# Patient Record
Sex: FEMALE
[De-identification: names, ages, dates, MRNs, and addresses within clinical notes are randomized per-mention and may not be internally consistent; named-entity substitution may affect disease eponyms.]

---

## 2023-05-28 ENCOUNTER — TRANSCRIPTION ENCOUNTER (OUTPATIENT)
Age: 88
End: 2023-05-28

## 2023-05-29 ENCOUNTER — INPATIENT (INPATIENT)
Facility: HOSPITAL | Age: 88
LOS: 7 days | Discharge: NURSING, REHAB OR LONG-TERM CARE | DRG: 24 | End: 2023-06-06
Attending: FAMILY MEDICINE | Admitting: NEUROLOGICAL SURGERY
Payer: MEDICARE

## 2023-05-29 ENCOUNTER — APPOINTMENT (OUTPATIENT)
Dept: NEUROSURGERY | Facility: HOSPITAL | Age: 88
End: 2023-05-29

## 2023-05-29 VITALS
OXYGEN SATURATION: 100 % | HEIGHT: 64 IN | WEIGHT: 114.64 LBS | RESPIRATION RATE: 21 BRPM | HEART RATE: 102 BPM | SYSTOLIC BLOOD PRESSURE: 124 MMHG | TEMPERATURE: 97 F | DIASTOLIC BLOOD PRESSURE: 57 MMHG

## 2023-05-29 DIAGNOSIS — I48.91 UNSPECIFIED ATRIAL FIBRILLATION: ICD-10-CM

## 2023-05-29 DIAGNOSIS — I63.9 CEREBRAL INFARCTION, UNSPECIFIED: ICD-10-CM

## 2023-05-29 LAB
A1C WITH ESTIMATED AVERAGE GLUCOSE RESULT: 5.7 % — HIGH (ref 4–5.6)
ALBUMIN SERPL ELPH-MCNC: 3 G/DL — LOW (ref 3.3–5.2)
ALP SERPL-CCNC: 70 U/L — SIGNIFICANT CHANGE UP (ref 40–120)
ALT FLD-CCNC: 21 U/L — SIGNIFICANT CHANGE UP
AMPHET UR-MCNC: NEGATIVE — SIGNIFICANT CHANGE UP
ANION GAP SERPL CALC-SCNC: 12 MMOL/L — SIGNIFICANT CHANGE UP (ref 5–17)
APPEARANCE UR: CLEAR — SIGNIFICANT CHANGE UP
APTT BLD: 28.8 SEC — SIGNIFICANT CHANGE UP (ref 27.5–35.5)
APTT BLD: 32.1 SEC — SIGNIFICANT CHANGE UP (ref 27.5–35.5)
AST SERPL-CCNC: 20 U/L — SIGNIFICANT CHANGE UP
BACTERIA # UR AUTO: ABNORMAL
BARBITURATES UR SCN-MCNC: NEGATIVE — SIGNIFICANT CHANGE UP
BASOPHILS # BLD AUTO: 0.02 K/UL — SIGNIFICANT CHANGE UP (ref 0–0.2)
BASOPHILS NFR BLD AUTO: 0.3 % — SIGNIFICANT CHANGE UP (ref 0–2)
BENZODIAZ UR-MCNC: NEGATIVE — SIGNIFICANT CHANGE UP
BILIRUB SERPL-MCNC: 0.6 MG/DL — SIGNIFICANT CHANGE UP (ref 0.4–2)
BILIRUB UR-MCNC: NEGATIVE — SIGNIFICANT CHANGE UP
BLD GP AB SCN SERPL QL: SIGNIFICANT CHANGE UP
BUN SERPL-MCNC: 27.2 MG/DL — HIGH (ref 8–20)
CALCIUM SERPL-MCNC: 8.3 MG/DL — LOW (ref 8.4–10.5)
CHLORIDE SERPL-SCNC: 104 MMOL/L — SIGNIFICANT CHANGE UP (ref 96–108)
CHOLEST SERPL-MCNC: 60 MG/DL — SIGNIFICANT CHANGE UP
CK SERPL-CCNC: 43 U/L — SIGNIFICANT CHANGE UP (ref 25–170)
CO2 SERPL-SCNC: 21 MMOL/L — LOW (ref 22–29)
COCAINE METAB.OTHER UR-MCNC: NEGATIVE — SIGNIFICANT CHANGE UP
COLOR SPEC: YELLOW — SIGNIFICANT CHANGE UP
CREAT SERPL-MCNC: 0.68 MG/DL — SIGNIFICANT CHANGE UP (ref 0.5–1.3)
DIFF PNL FLD: ABNORMAL
EGFR: 82 ML/MIN/1.73M2 — SIGNIFICANT CHANGE UP
EOSINOPHIL # BLD AUTO: 0.01 K/UL — SIGNIFICANT CHANGE UP (ref 0–0.5)
EOSINOPHIL NFR BLD AUTO: 0.1 % — SIGNIFICANT CHANGE UP (ref 0–6)
EPI CELLS # UR: SIGNIFICANT CHANGE UP
GLUCOSE SERPL-MCNC: 116 MG/DL — HIGH (ref 70–99)
GLUCOSE UR QL: NEGATIVE — SIGNIFICANT CHANGE UP
HCT VFR BLD CALC: 16.6 % — CRITICAL LOW (ref 34.5–45)
HCT VFR BLD CALC: 27.3 % — LOW (ref 34.5–45)
HDLC SERPL-MCNC: 27 MG/DL — LOW
HGB BLD-MCNC: 5.5 G/DL — CRITICAL LOW (ref 11.5–15.5)
HGB BLD-MCNC: 9 G/DL — LOW (ref 11.5–15.5)
IMM GRANULOCYTES NFR BLD AUTO: 0.6 % — SIGNIFICANT CHANGE UP (ref 0–0.9)
INR BLD: 1.14 RATIO — SIGNIFICANT CHANGE UP (ref 0.88–1.16)
INR BLD: 1.61 RATIO — HIGH (ref 0.88–1.16)
KETONES UR-MCNC: ABNORMAL
LEUKOCYTE ESTERASE UR-ACNC: ABNORMAL
LIPID PNL WITH DIRECT LDL SERPL: 26 MG/DL — SIGNIFICANT CHANGE UP
LYMPHOCYTES # BLD AUTO: 0.66 K/UL — LOW (ref 1–3.3)
LYMPHOCYTES # BLD AUTO: 9.4 % — LOW (ref 13–44)
MAGNESIUM SERPL-MCNC: 1.8 MG/DL — SIGNIFICANT CHANGE UP (ref 1.6–2.6)
MANUAL SMEAR VERIFICATION: SIGNIFICANT CHANGE UP
MCHC RBC-ENTMCNC: 26.1 PG — LOW (ref 27–34)
MCHC RBC-ENTMCNC: 26.7 PG — LOW (ref 27–34)
MCHC RBC-ENTMCNC: 33 GM/DL — SIGNIFICANT CHANGE UP (ref 32–36)
MCHC RBC-ENTMCNC: 33.1 GM/DL — SIGNIFICANT CHANGE UP (ref 32–36)
MCV RBC AUTO: 79.1 FL — LOW (ref 80–100)
MCV RBC AUTO: 80.6 FL — SIGNIFICANT CHANGE UP (ref 80–100)
METHADONE UR-MCNC: NEGATIVE — SIGNIFICANT CHANGE UP
MONOCYTES # BLD AUTO: 0.64 K/UL — SIGNIFICANT CHANGE UP (ref 0–0.9)
MONOCYTES NFR BLD AUTO: 9.1 % — SIGNIFICANT CHANGE UP (ref 2–14)
MRSA PCR RESULT.: SIGNIFICANT CHANGE UP
NEUTROPHILS # BLD AUTO: 5.66 K/UL — SIGNIFICANT CHANGE UP (ref 1.8–7.4)
NEUTROPHILS NFR BLD AUTO: 80.5 % — HIGH (ref 43–77)
NITRITE UR-MCNC: POSITIVE
NON HDL CHOLESTEROL: 33 MG/DL — SIGNIFICANT CHANGE UP
OPIATES UR-MCNC: NEGATIVE — SIGNIFICANT CHANGE UP
PCP SPEC-MCNC: SIGNIFICANT CHANGE UP
PCP UR-MCNC: NEGATIVE — SIGNIFICANT CHANGE UP
PH UR: 5 — SIGNIFICANT CHANGE UP (ref 5–8)
PHOSPHATE SERPL-MCNC: 1.7 MG/DL — LOW (ref 2.4–4.7)
PHOSPHATE SERPL-MCNC: 3.4 MG/DL — SIGNIFICANT CHANGE UP (ref 2.4–4.7)
PLAT MORPH BLD: NORMAL — SIGNIFICANT CHANGE UP
PLATELET # BLD AUTO: 153 K/UL — SIGNIFICANT CHANGE UP (ref 150–400)
PLATELET # BLD AUTO: 95 K/UL — LOW (ref 150–400)
POTASSIUM SERPL-MCNC: 3.5 MMOL/L — SIGNIFICANT CHANGE UP (ref 3.5–5.3)
POTASSIUM SERPL-SCNC: 3.5 MMOL/L — SIGNIFICANT CHANGE UP (ref 3.5–5.3)
PROT SERPL-MCNC: 5.9 G/DL — LOW (ref 6.6–8.7)
PROT UR-MCNC: 30 MG/DL
PROTHROM AB SERPL-ACNC: 13.3 SEC — SIGNIFICANT CHANGE UP (ref 10.5–13.4)
PROTHROM AB SERPL-ACNC: 18.8 SEC — HIGH (ref 10.5–13.4)
RBC # BLD: 2.06 M/UL — LOW (ref 3.8–5.2)
RBC # BLD: 3.45 M/UL — LOW (ref 3.8–5.2)
RBC # FLD: 14.7 % — HIGH (ref 10.3–14.5)
RBC # FLD: 14.9 % — HIGH (ref 10.3–14.5)
RBC BLD AUTO: NORMAL — SIGNIFICANT CHANGE UP
RBC CASTS # UR COMP ASSIST: ABNORMAL /HPF (ref 0–4)
S AUREUS DNA NOSE QL NAA+PROBE: SIGNIFICANT CHANGE UP
SODIUM SERPL-SCNC: 136 MMOL/L — SIGNIFICANT CHANGE UP (ref 135–145)
SP GR SPEC: 1 — LOW (ref 1.01–1.02)
THC UR QL: NEGATIVE — SIGNIFICANT CHANGE UP
TRIGL SERPL-MCNC: 34 MG/DL — SIGNIFICANT CHANGE UP
TROPONIN T SERPL-MCNC: <0.01 NG/ML — SIGNIFICANT CHANGE UP (ref 0–0.06)
TSH SERPL-MCNC: 1.32 UIU/ML — SIGNIFICANT CHANGE UP (ref 0.27–4.2)
UROBILINOGEN FLD QL: NEGATIVE — SIGNIFICANT CHANGE UP
WBC # BLD: 10.98 K/UL — HIGH (ref 3.8–10.5)
WBC # BLD: 7.03 K/UL — SIGNIFICANT CHANGE UP (ref 3.8–10.5)
WBC # FLD AUTO: 10.98 K/UL — HIGH (ref 3.8–10.5)
WBC # FLD AUTO: 7.03 K/UL — SIGNIFICANT CHANGE UP (ref 3.8–10.5)
WBC UR QL: >50 /HPF (ref 0–5)

## 2023-05-29 PROCEDURE — 99284 EMERGENCY DEPT VISIT MOD MDM: CPT

## 2023-05-29 PROCEDURE — 99232 SBSQ HOSP IP/OBS MODERATE 35: CPT

## 2023-05-29 PROCEDURE — 93970 EXTREMITY STUDY: CPT | Mod: 26

## 2023-05-29 PROCEDURE — 61645 PERQ ART M-THROMBECT &/NFS: CPT | Mod: RT

## 2023-05-29 PROCEDURE — 36226 PLACE CATH VERTEBRAL ART: CPT | Mod: 59,RT

## 2023-05-29 PROCEDURE — 36223 PLACE CATH CAROTID/INOM ART: CPT | Mod: 59,LT

## 2023-05-29 PROCEDURE — 70496 CT ANGIOGRAPHY HEAD: CPT | Mod: 26

## 2023-05-29 PROCEDURE — 93010 ELECTROCARDIOGRAM REPORT: CPT

## 2023-05-29 PROCEDURE — 93306 TTE W/DOPPLER COMPLETE: CPT | Mod: 26

## 2023-05-29 PROCEDURE — 99291 CRITICAL CARE FIRST HOUR: CPT

## 2023-05-29 RX ORDER — CHLORHEXIDINE GLUCONATE 213 G/1000ML
1 SOLUTION TOPICAL DAILY
Refills: 0 | Status: DISCONTINUED | OUTPATIENT
Start: 2023-05-29 | End: 2023-06-06

## 2023-05-29 RX ORDER — SODIUM CHLORIDE 9 MG/ML
500 INJECTION INTRAMUSCULAR; INTRAVENOUS; SUBCUTANEOUS ONCE
Refills: 0 | Status: COMPLETED | OUTPATIENT
Start: 2023-05-29 | End: 2023-05-29

## 2023-05-29 RX ORDER — ALBUTEROL 90 UG/1
2.5 AEROSOL, METERED ORAL EVERY 6 HOURS
Refills: 0 | Status: DISCONTINUED | OUTPATIENT
Start: 2023-05-29 | End: 2023-06-06

## 2023-05-29 RX ORDER — HYDRALAZINE HCL 50 MG
10 TABLET ORAL
Refills: 0 | Status: DISCONTINUED | OUTPATIENT
Start: 2023-05-29 | End: 2023-06-05

## 2023-05-29 RX ORDER — MAGNESIUM SULFATE 500 MG/ML
2 VIAL (ML) INJECTION ONCE
Refills: 0 | Status: COMPLETED | OUTPATIENT
Start: 2023-05-29 | End: 2023-05-29

## 2023-05-29 RX ORDER — SENNA PLUS 8.6 MG/1
2 TABLET ORAL AT BEDTIME
Refills: 0 | Status: DISCONTINUED | OUTPATIENT
Start: 2023-05-29 | End: 2023-06-06

## 2023-05-29 RX ORDER — CEFTRIAXONE 500 MG/1
INJECTION, POWDER, FOR SOLUTION INTRAMUSCULAR; INTRAVENOUS
Refills: 0 | Status: COMPLETED | OUTPATIENT
Start: 2023-05-29 | End: 2023-06-02

## 2023-05-29 RX ORDER — POTASSIUM CHLORIDE 20 MEQ
10 PACKET (EA) ORAL
Refills: 0 | Status: COMPLETED | OUTPATIENT
Start: 2023-05-29 | End: 2023-05-29

## 2023-05-29 RX ORDER — SIMVASTATIN 20 MG/1
10 TABLET, FILM COATED ORAL AT BEDTIME
Refills: 0 | Status: DISCONTINUED | OUTPATIENT
Start: 2023-05-29 | End: 2023-06-06

## 2023-05-29 RX ORDER — DIGOXIN 250 MCG
125 TABLET ORAL DAILY
Refills: 0 | Status: DISCONTINUED | OUTPATIENT
Start: 2023-05-29 | End: 2023-06-06

## 2023-05-29 RX ORDER — BUDESONIDE AND FORMOTEROL FUMARATE DIHYDRATE 160; 4.5 UG/1; UG/1
2 AEROSOL RESPIRATORY (INHALATION)
Refills: 0 | Status: DISCONTINUED | OUTPATIENT
Start: 2023-05-29 | End: 2023-06-06

## 2023-05-29 RX ORDER — FAMOTIDINE 10 MG/ML
20 INJECTION INTRAVENOUS DAILY
Refills: 0 | Status: DISCONTINUED | OUTPATIENT
Start: 2023-05-29 | End: 2023-06-06

## 2023-05-29 RX ORDER — LEVOTHYROXINE SODIUM 125 MCG
112 TABLET ORAL DAILY
Refills: 0 | Status: DISCONTINUED | OUTPATIENT
Start: 2023-05-29 | End: 2023-06-06

## 2023-05-29 RX ORDER — PHENYLEPHRINE HYDROCHLORIDE 10 MG/ML
0.1 INJECTION INTRAVENOUS
Qty: 40 | Refills: 0 | Status: DISCONTINUED | OUTPATIENT
Start: 2023-05-29 | End: 2023-05-30

## 2023-05-29 RX ORDER — LABETALOL HCL 100 MG
10 TABLET ORAL
Refills: 0 | Status: DISCONTINUED | OUTPATIENT
Start: 2023-05-29 | End: 2023-06-05

## 2023-05-29 RX ORDER — CEFTRIAXONE 500 MG/1
1000 INJECTION, POWDER, FOR SOLUTION INTRAMUSCULAR; INTRAVENOUS ONCE
Refills: 0 | Status: COMPLETED | OUTPATIENT
Start: 2023-05-29 | End: 2023-05-29

## 2023-05-29 RX ORDER — CEFTRIAXONE 500 MG/1
1000 INJECTION, POWDER, FOR SOLUTION INTRAMUSCULAR; INTRAVENOUS EVERY 24 HOURS
Refills: 0 | Status: COMPLETED | OUTPATIENT
Start: 2023-05-30 | End: 2023-06-01

## 2023-05-29 RX ORDER — CEFTRIAXONE 500 MG/1
INJECTION, POWDER, FOR SOLUTION INTRAMUSCULAR; INTRAVENOUS
Refills: 0 | Status: DISCONTINUED | OUTPATIENT
Start: 2023-05-29 | End: 2023-05-29

## 2023-05-29 RX ORDER — POLYETHYLENE GLYCOL 3350 17 G/17G
17 POWDER, FOR SOLUTION ORAL DAILY
Refills: 0 | Status: DISCONTINUED | OUTPATIENT
Start: 2023-05-29 | End: 2023-06-06

## 2023-05-29 RX ORDER — NICARDIPINE HYDROCHLORIDE 30 MG/1
5 CAPSULE, EXTENDED RELEASE ORAL
Qty: 40 | Refills: 0 | Status: DISCONTINUED | OUTPATIENT
Start: 2023-05-29 | End: 2023-05-29

## 2023-05-29 RX ORDER — SODIUM CHLORIDE 9 MG/ML
1000 INJECTION INTRAMUSCULAR; INTRAVENOUS; SUBCUTANEOUS
Refills: 0 | Status: DISCONTINUED | OUTPATIENT
Start: 2023-05-29 | End: 2023-05-30

## 2023-05-29 RX ADMIN — Medication 100 MILLIEQUIVALENT(S): at 08:51

## 2023-05-29 RX ADMIN — CEFTRIAXONE 1000 MILLIGRAM(S): 500 INJECTION, POWDER, FOR SOLUTION INTRAMUSCULAR; INTRAVENOUS at 16:00

## 2023-05-29 RX ADMIN — CHLORHEXIDINE GLUCONATE 1 APPLICATION(S): 213 SOLUTION TOPICAL at 08:53

## 2023-05-29 RX ADMIN — SODIUM CHLORIDE 75 MILLILITER(S): 9 INJECTION INTRAMUSCULAR; INTRAVENOUS; SUBCUTANEOUS at 04:42

## 2023-05-29 RX ADMIN — SODIUM CHLORIDE 500 MILLILITER(S): 9 INJECTION INTRAMUSCULAR; INTRAVENOUS; SUBCUTANEOUS at 03:42

## 2023-05-29 RX ADMIN — FAMOTIDINE 20 MILLIGRAM(S): 10 INJECTION INTRAVENOUS at 17:35

## 2023-05-29 RX ADMIN — Medication 25 GRAM(S): at 06:41

## 2023-05-29 RX ADMIN — BUDESONIDE AND FORMOTEROL FUMARATE DIHYDRATE 2 PUFF(S): 160; 4.5 AEROSOL RESPIRATORY (INHALATION) at 20:16

## 2023-05-29 RX ADMIN — Medication 125 MICROGRAM(S): at 17:35

## 2023-05-29 RX ADMIN — SODIUM CHLORIDE 75 MILLILITER(S): 9 INJECTION INTRAMUSCULAR; INTRAVENOUS; SUBCUTANEOUS at 11:52

## 2023-05-29 RX ADMIN — Medication 100 MILLIEQUIVALENT(S): at 06:41

## 2023-05-29 RX ADMIN — SODIUM CHLORIDE 500 MILLILITER(S): 9 INJECTION INTRAMUSCULAR; INTRAVENOUS; SUBCUTANEOUS at 06:41

## 2023-05-29 RX ADMIN — SENNA PLUS 2 TABLET(S): 8.6 TABLET ORAL at 21:28

## 2023-05-29 RX ADMIN — Medication 100 MILLIEQUIVALENT(S): at 08:00

## 2023-05-29 RX ADMIN — PHENYLEPHRINE HYDROCHLORIDE 1.95 MICROGRAM(S)/KG/MIN: 10 INJECTION INTRAVENOUS at 11:52

## 2023-05-29 RX ADMIN — SIMVASTATIN 10 MILLIGRAM(S): 20 TABLET, FILM COATED ORAL at 21:28

## 2023-05-29 NOTE — CONSULT NOTE ADULT - PROBLEM SELECTOR RECOMMENDATION 9
- Patient with longstanding history of AFib.   - Patient had been on Eliquis for years without incidence, but had a mechanical fall recently so it was discontinued.  - 9 days later patient had acute CVA.   - Hemoglobin of 5.5 was an error.   - [id number] score of 5.   - Would restart AC when cleared by Neurology.   - EP evaluation for Watchman device.   - Continue telemetry monitoring.

## 2023-05-29 NOTE — SWALLOW BEDSIDE ASSESSMENT ADULT - ORAL PHASE
poor attention to bolus, cues needed for swallow elicitation/Decreased anterior-posterior movement of the bolus/Delayed oral transit time ? posterior loss stasis reduces with subsequent swallows; grossly functional/Stasis in lateral sulci/Lingual stasis ? posterior loss with cup sip

## 2023-05-29 NOTE — H&P ADULT - NSHPPHYSICALEXAM_GEN_ALL_CORE
Constitutional: NAD  Neuro  * Mental Status: EO spontaneously, RUE/RLE moving spontaneously, not speaking, not following commands, global aphasia   * Cranial Nerves: PERRL, R gaze deviation unable to overcome, L facial droop  * Motor: RUE AG with drift, RLE AG with drift, LUE/LLE WD   * Sensory: Sensation grossly intact to noxious stimuli   * Reflexes: not assessed   Cardiovascular: irregular rate and rhythm   Eyes: See neurologic examination with detailed examination of eyes.  ENT: No JVD, Trachea Midline  Respiratory: non labored breathing   Gastrointestinal: Soft, nontender, nondistended.  Genitourinary: [ ] Nolen, [ x ] No Nolen.   Musculoskeletal: No muscle wasting noted, (See neurologic assessment for full muscle strength assessment)   Skin:  no wounds, no redness, no abrasions noted  Hematologic / Lymph / Immunologic: No bleeding from IV sites or wounds

## 2023-05-29 NOTE — CHART NOTE - NSCHARTNOTEFT_GEN_A_CORE
Neurointerventional Surgery Post Procedure Note    Procedure: Selective Cerebral Angiography     Pre- Procedure Diagnosis: RM1 occlusion  Post- Procedure Diagnosis: RM1 occlusion, TICI 3 after 1 pass     : Dr. Powers  Physician Assistant: Adrienne Madsen   Nurse: Radha Bustamante  Anesthesiologist: Dr. Enrique Valerio                   Radiology Tech: Allan Spencer     Sheath:  6 Lebanese Sheath    I/Os: estimated blood loss less than 10cc,  IV fluids 200cc, Urine output 0cc, Contrast: Omnipaque 240   cc    Vitals:  /71    Spo2 100 %    Preliminary Report:  Under a 6 Lebanese BMX sheath via the right groin under MAC sedation via left internal carotid artery, right vertebral artery, right internal carotid artery, a selective cerebral angiography  was performed and reveals RM1 occlusion, TICI 3 after 1 pass. ( Official note to follow).    Patient tolerated procedure well.  Patient remains hemodynamically stable, no change in neurological status compared to baseline.  Results were discussed with patient, patient's family and Neurosurgery.  Right groin sheath  was discontinued using vascade closure device at 0157. Hemostasis was obtained with approximately 13 minutes of manual compression.     No active bleeding, no hematoma, no ecchymosis.   Quick clot and safeguard balloon dressing applied at 0210  Patient transferred to neuro ICU in stable condition. Neurointerventional Surgery Post Procedure Note    Procedure: Selective Cerebral Angiography     Pre- Procedure Diagnosis: RM1 occlusion  Post- Procedure Diagnosis: RM1 occlusion, TICI 3 after 1 pass     : Dr. Powers  Physician Assistant: Adrienne Madsen   Nurse: Radha Bustamante  Anesthesiologist: Dr. Enrique Valerio                   Radiology Tech: Allan Spencer     Sheath:  6 [place]ankan Sheath    I/Os: estimated blood loss less than 10cc,  IV fluids 200cc, Urine output 0cc, Contrast: Omnipaque 240  50 cc    Vitals:  /71    Spo2 100 %    Preliminary Report:  Under a 6 [place]an BMX sheath via the right groin under MAC sedation via left internal carotid artery, right vertebral artery, right internal carotid artery, a selective cerebral angiography  was performed and reveals RM1 occlusion, TICI 3 after 1 pass. ( Official note to follow).    Patient tolerated procedure well.  Patient remains hemodynamically stable, no change in neurological status compared to baseline.  Results were discussed with patient, patient's family and Neurosurgery.  Right groin sheath  was discontinued using vascade closure device at 0157. Hemostasis was obtained with approximately 13 minutes of manual compression.     No active bleeding, no hematoma, no ecchymosis.   Quick clot and safeguard balloon dressing applied at 0210  Patient transferred to neuro ICU in stable condition.

## 2023-05-29 NOTE — SWALLOW BEDSIDE ASSESSMENT ADULT - SWALLOW EVAL: DIAGNOSIS
At least mild-moderate oral dysphagia characterized by reduced lingual strength/ROM and reduced left buccal tension. +mild lingual and trace left lateral sulci residue with puree which clears with subsequent swallows. ?posterior loss of liquids. +oral holding of moderately thick liquids, cues needed for swallow elicitation.  Pharyngeal dysphagia suspected due to +throat clear following thin liquids via tsp and +coughing following mildly thick liquids. Pt at HIGH risk for aspiration with moderately thick liquids due to oral holding. Oral and stages appear grossly functional for PUREE only at this time, with close monitoring of PO tolerance.

## 2023-05-29 NOTE — CONSULT NOTE ADULT - SUBJECTIVE AND OBJECTIVE BOX
[facility]                                              CARDIOLOGY AT [address], [place] [address]                                             Telephone: [phone]. Fax:[phone]                                                       CARDIOLOGY CONSULTATION NOTE                                                                                             History obtained by: Patient and medical record  Community Cardiologist: In [place]   obtained: Yes [  ] No [  ]  Reason for Consultation: CVA, Afib  Available out pt records reviewed: Yes [  ] No [  ]    Patient's real name Melina Cherry     Chief complaint:    Patient is a 89y old  Female who presents with a chief complaint of stroke (29 May 2023 08:19)      HPI: Patient is a 89 y/o F with a PMHx of Afib (taken off AC 9 days ago s/p fall) s/p MDT PPM who [place] [facility] with acute right gaze deviation, aphasia, and left sided weakness. Patient was last well known 23 at 1900, and was found at 2100 with the aforementioned symptoms, and was taken to [facility]. Patient found to have an acute right M1 occlusion, s/p TNK at 23:33, and was transferred to [facility] for mechanical thrombectomy. Patient underwent neurointervention, and is currently in the ICU. Patient still aphasic, but able to provide some history. States she had no cardiac issues other then the Afib and pacemaker. Patient functions independently prior to CVA able to complete ADLs without any assistance. Patient denies fevers, chills, CP, SOB, N/V/D, or dizziness.     PAST MEDICAL HISTORY  Afib      PAST SURGICAL HISTORY      SOCIAL HISTORY:  Denies smoking/alcohol/drugs      FAMILY HISTORY:    Family History of Cardiovascular Disease:  Yes [  ] No [  ]  Coronary Artery Disease in first degree relative: Yes [  ] No [  ]  Sudden Cardiac Death in First degree relative: Yes [  ] No [  ]    HOME MEDICATIONS:      CURRENT CARDIAC MEDICATIONS:  hydrALAZINE Injectable 10 milliGRAM(s) IV Push every 2 hours PRN SBP >140  labetalol Injectable 10 milliGRAM(s) IV Push every 2 hours PRN SBP >140      CURRENT OTHER MEDICATIONS:  chlorhexidine 2% Cloths 1 Application(s) Topical daily  sodium chloride 0.9%. 1000 milliLiter(s) (75 mL/Hr) IV Continuous <Continuous>      ALLERGIES:   No Known Allergies      REVIEW OF SYMPTOMS:   CONSTITUTIONAL: No fever, no chills, no weight loss, no weight gain, no fatigue   ENMT:  No vertigo; No sinus or throat pain  NECK: No pain or stiffness  CARDIOVASCULAR: No chest pain, no dyspnea, no syncope/presyncope, no palpitations, no dizziness, no Orthopnea, no Paroxsymal nocturnal dyspnea  RESPIRATORY: no Shortness of breath, no cough, no wheezing  : No dysuria, no hematuria   GI: No dark color stool, no nausea, no diarrhea, no constipation, no abdominal pain   NEURO: AS PER HPI  MUSCULOSKELETAL: No joint pain or swelling; No muscle, back, or extremity pain  PSYCH: No agitation, no anxiety.    ALL OTHER REVIEW OF SYSTEMS ARE NEGATIVE.    VITAL SIGNS:  T(C): 36.8 (23 @ 09:10), Max: 36.8 (23 @ 06:10)  T(F): 98.2 (23 @ 09:10), Max: 98.2 (23 @ 06:10)  HR: 64 (23 09:10) (64 - 102)  BP: 106/39 (23 @ 09:10) (96/50 - 124/57)  RR: 16 (23 09:10) (13 - 22)  SpO2: 97% (23 09:10) (93% - 100%)    INTAKE AND OUTPUT:      @ 07:01  -   @ 07:00  --------------------------------------------------------  IN: 1500 mL / OUT: 150 mL / NET: 1350 mL     @ 07:01  -   @ 09:39  --------------------------------------------------------  IN: 350 mL / OUT: 0 mL / NET: 350 mL        PHYSICAL EXAM:  Constitutional: Comfortable . No acute distress.   HEENT: Atraumatic and normocephalic , neck is supple . no JVD. No carotid bruit.  CNS: A&Ox3, dysarthria, left sided weakness  Respiratory: CTAB, unlabored   Cardiovascular: Irregularly irreuglar, No rubs or gallop. II/VI systolic murmur lsb  Gastrointestinal: Soft, non-tender. +Bowel sounds.   Extremities: 2+ Peripheral Pulses, No clubbing, cyanosis, or edema  Psychiatric: Calm . no agitation.   Skin: Warm and dry, no ulcers on extremities     LABS:  ( 29 May 2023 04:00 )  Troponin T  <0.01,  CPK  43   , CKMB  X    , BNP X                                  9.0    10.98 )-----------( 153      ( 29 May 2023 04:50 )             27.3     05    136  |  104  |  27.2<H>  ----------------------------<  116<H>  3.5   |  21.0<L>  |  0.68    Ca    8.3<L>      29 May 2023 05:30  Phos  3.4       Mg     1.8         TPro  5.9<L>  /  Alb  3.0<L>  /  TBili  0.6  /  DBili  x   /  AST  20  /  ALT  21  /  AlkPhos  70  -    PT/INR - ( 29 May 2023 07:00 )   PT: 13.3 sec;   INR: 1.14 ratio         PTT - ( 29 May 2023 07:00 )  PTT:28.8 sec  Urinalysis Basic - ( 29 May 2023 07:00 )    Color: Yellow / Appearance: Clear / S.005 / pH: x  Gluc: x / Ketone: Trace  / Bili: Negative / Urobili: Negative   Blood: x / Protein: 30 mg/dL / Nitrite: Positive   Leuk Esterase: Small / RBC: 3-5 /HPF / WBC >50 /HPF   Sq Epi: x / Non Sq Epi: x / Bacteria: Few      23 @ 04:00  CHolesterol: 60,  HDL: 27,  LDL: 26, Triglycerides: 34       Thyroid Stimulating Hormone, Serum: 1.32 uIU/mL (23 @ 04:00)      INTERPRETATION OF TELEMETRY: Afib, V-paced    ECG: Afib, V-paced, NSST/T wave changes  Prior ECG: Yes [  ] No [  ]    RADIOLOGY & ADDITIONAL STUDIES:    X-ray:      CT scan:     MRI:   US:

## 2023-05-29 NOTE — H&P ADULT - HISTORY OF PRESENT ILLNESS
89F with PMH afib recently taken off AC who presented with R gaze deviation and L sided weakness. Last known well 1900, was found by  at 2100 with symptoms. Patient taken to [facility], code stroke initiated, NIH 26, found to have RM1 occlusion on CTA. Patient was given TNK at 23:33 and was transferred to [facility] for mechanical thrombectomy. On arrival, patient aphasic, unable to perform ROS.     NIH 26, mRS 0 on admission    NIH SS:  DATE: 5/29/23   TIME: 0110  1A: Level of consciousness (0-3): 0  1B: Questions (0-2): 2  1C: Commands (0-2): 2  2: Gaze (0-2): 2  3: Visual fields (0-3): 2  4: Facial palsy (0-3): 2  MOTOR:  5A: Left arm motor drift (0-4): 3  5B: Right arm motor drift (0-4): 2  6A: Left leg motor drift (0-4): 3  6B: Right leg motor drift (0-4): 2  7: Limb ataxia (0-2): 0  SENSORY:  8: Sensation (0-2): 0  SPEECH:  9: Language (0-3): 2  10: Dysarthria (0-2): 2  EXTINCTION:  11: Extinction/inattention (0-2): 2    TOTAL SCORE: 26

## 2023-05-29 NOTE — CONSULT NOTE ADULT - NS ATTEND AMEND GEN_ALL_CORE FT
Patient was seen and examined by me. History and exam as documented above by PA/NP was confirmed by me.  Agree with plan as outlined above.
Patient seen and examined by me personally. 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 She was taken off of her anticoagulation due to a mechanical fall. She now presents to with a CVA s/p thrombectomy by neuro today. Consulted for recs re: anticoagulation. She is high risk for recurrent stroke with her underlying atrial fibrillation. She has not had snycope or recurrent falls in the past; no evidence of GI bleeding either. She would be a good candidate for LAAO and in the meantime would recommend restarted low dose eliquis when safe to do so from Neuro standpoint. Will discuss with EP.      Dmitri Saucedo MD  Interventional and Structural Cardiology  [phone]

## 2023-05-29 NOTE — CONSULT NOTE ADULT - SUBJECTIVE AND OBJECTIVE BOX
Preliminary note, official note pending attending review/signature.                               [facility] Stroke Team    CC: left sided weakness     HPI:  89F with PMH afib recently taken off AC who presented with Right  gaze deviation and Left sided weakness. Last known well 1900 5/28/23, was found by  at 2100 that day with symptoms. Patient taken to [facility], code stroke initiated, NIH 26, found to have Right M1 occlusion on CTA, additionally noted basilar thrombus. Patient was given Tenecteplase at 23:33 5/28/23 and was transferred to [facility] for mechanical thrombectomy. On arrival, patient aphasic, unable to perform ROS.   NIH 26, mRS 0 on admission    PAST MEDICAL & SURGICAL HISTORY:  Afib    MEDICATIONS  (STANDING):  chlorhexidine 2% Cloths 1 Application(s) Topical daily  potassium chloride  10 mEq/100 mL IVPB 10 milliEquivalent(s) IV Intermittent every 1 hour  sodium chloride 0.9%. 1000 milliLiter(s) (75 mL/Hr) IV Continuous <Continuous>    MEDICATIONS  (PRN):  hydrALAZINE Injectable 10 milliGRAM(s) IV Push every 2 hours PRN SBP >140  labetalol Injectable 10 milliGRAM(s) IV Push every 2 hours PRN SBP >140    Allergies  No Known Allergies    SOCIAL HISTORY: denies   no tob,   no alcohol   no drugs    FAMILY HISTORY:    ROS: 14 point ROS negative other than what is present in HPI or below    Vital Signs Last 24 Hrs  T(C): 36.6 (29 May 2023 07:10), Max: 36.8 (29 May 2023 06:10)  T(F): 97.9 (29 May 2023 07:10), Max: 98.2 (29 May 2023 06:10)  HR: 71 (29 May 2023 07:10) (64 - 102)  BP: 110/50 (29 May 2023 07:10) (96/50 - 124/57)  BP(mean): 70 (29 May 2023 07:10) (55 - 103)  RR: 15 (29 May 2023 07:10) (15 - 22)  SpO2: 100% (29 May 2023 07:10) (93% - 100%)    General: NAD    Detailed Neurologic Exam:    Mental status: The patient is awake and alert, oriented to self, place, states end of may, disoriented to year.  The patient is able to name objects w/ choices, notes function of a pen, follows simple commands, repeats sentences.    Cranial nerves: Dysarthria, left facial palsy, Pupils equal 2mm and react symmetrically to light. VF appear overall full to counting fingers, blinks to threat . Extraocular motion is full with no nystagmus. There is no ptosis. Tongue midline.     Motor: There is normal bulk and tone.  There is no tremor.  Strength is 5/5 in the right arm and  4/5 leg.   Strength is 3/5 in the left arm and 4-/5 leg. Drift in arm to bed in 10 seconds, no drift in leg.    Sensation: Intact to light touch and pin in 4 extremities, left sensory extinction     Cerebellar: There is no dysmetria on finger to nose testing.    Gait : deferred    [facility] SS:  DATE: 5/29/23  TIME: 0822  1A: Level of consciousness (0-3):   1B: Questions (0-2): 1  1C: Commands (0-2):   2: Gaze (0-2):   3: Visual fields (0-3):   4: Facial palsy (0-3): 1  MOTOR:  5A: Left arm motor drift (0-4): 2  5B: Right arm motor drift (0-4):   6A: Left leg motor drift (0-4):   6B: Right leg motor drift (0-4):   7: Limb ataxia (0-2):   SENSORY:  8: Sensation (0-2):   SPEECH:  9: Language (0-3):   10: Dysarthria (0-2): 1  EXTINCTION:  11: Extinction/inattention (0-2): 1    TOTAL SCORE: 6    prehospital mRS= 0      LABS:                         9.0    10.98 )-----------( 153      ( 29 May 2023 04:50 )             27.3       05-29    136  |  104  |  27.2<H>  ----------------------------<  116<H>  3.5   |  21.0<L>  |  0.68    Ca    8.3<L>      29 May 2023 05:30  Phos  3.4     05-29  Mg     1.8     05-29    TPro  5.9<L>  /  Alb  3.0<L>  /  TBili  0.6  /  DBili  x   /  AST  20  /  ALT  21  /  AlkPhos  70  05-29      PT/INR - ( 29 May 2023 07:00 )   PT: 13.3 sec;   INR: 1.14 ratio         PTT - ( 29 May 2023 07:00 )  PTT:28.8 sec    Lipid Profile (05.29.23 @ 04:00)    Cholesterol, Serum: 60 mg/dL   Triglycerides, Serum: 34 mg/dL   HDL Cholesterol, Serum: 27 mg/dL   Non HDL Cholesterol: 33    LDL Cholesterol Calculated: 26 mg/dL    A1C with Estimated Average Glucose (05.29.23 @ 04:00)    A1C with Estimated Average Glucose Result: 5.7: Run on Special Rack %    RADIOLOGY & ADDITIONAL STUDIES (independently reviewed unless otherwise noted):   see PACS Preliminary note, official note pending attending review/signature.                               [facility] Stroke Team    CC: left sided weakness     HPI:  89F with PMH afib recently taken off AC who presented with Right  gaze deviation and Left sided weakness. Last known well 1900 5/28/23, was found by  at 2100 that day with symptoms. Patient taken to [facility], code stroke initiated, NIH 26, found to have Right M1 occlusion on CTA, additionally noted basilar thrombus per Dr. Perez . Patient was given Tenecteplase at 23:33 5/28/23 and was transferred to [facility] for mechanical thrombectomy. On arrival, patient aphasic, unable to perform ROS.   NIH 26, mRS 0 on admission    PAST MEDICAL & SURGICAL HISTORY:  Afib    MEDICATIONS  (STANDING):  chlorhexidine 2% Cloths 1 Application(s) Topical daily  potassium chloride  10 mEq/100 mL IVPB 10 milliEquivalent(s) IV Intermittent every 1 hour  sodium chloride 0.9%. 1000 milliLiter(s) (75 mL/Hr) IV Continuous <Continuous>    MEDICATIONS  (PRN):  hydrALAZINE Injectable 10 milliGRAM(s) IV Push every 2 hours PRN SBP >140  labetalol Injectable 10 milliGRAM(s) IV Push every 2 hours PRN SBP >140    Allergies  No Known Allergies    SOCIAL HISTORY: denies   no tob,   no alcohol   no drugs    FAMILY HISTORY:    ROS: 14 point ROS negative other than what is present in HPI or below    Vital Signs Last 24 Hrs  T(C): 36.6 (29 May 2023 07:10), Max: 36.8 (29 May 2023 06:10)  T(F): 97.9 (29 May 2023 07:10), Max: 98.2 (29 May 2023 06:10)  HR: 71 (29 May 2023 07:10) (64 - 102)  BP: 110/50 (29 May 2023 07:10) (96/50 - 124/57)  BP(mean): 70 (29 May 2023 07:10) (55 - 103)  RR: 15 (29 May 2023 07:10) (15 - 22)  SpO2: 100% (29 May 2023 07:10) (93% - 100%)    General: NAD    Detailed Neurologic Exam:    Mental status: The patient is awake and alert, oriented to self, place, states end of may, disoriented to year.  The patient is able to name objects w/ choices, notes function of a pen, follows simple commands, repeats sentences.    Cranial nerves: Dysarthria, left facial palsy, Pupils equal 2mm and react symmetrically to light. VF appear overall full to counting fingers, blinks to threat . Extraocular motion is full with no nystagmus. There is no ptosis. Tongue midline.     Motor: There is normal bulk and tone.  There is no tremor.  Strength is 5/5 in the right arm and  4/5 leg.   Strength is 3/5 in the left arm and 4-/5 leg. Drift in arm to bed in 10 seconds, no drift in leg.    Sensation: Intact to light touch and pin in 4 extremities, left sensory extinction     Cerebellar: There is no dysmetria on finger to nose testing.    Gait : deferred    [facility] SS:  DATE: 5/29/23  TIME: 0822  1A: Level of consciousness (0-3):   1B: Questions (0-2): 1  1C: Commands (0-2):   2: Gaze (0-2):   3: Visual fields (0-3):   4: Facial palsy (0-3): 1  MOTOR:  5A: Left arm motor drift (0-4): 2  5B: Right arm motor drift (0-4):   6A: Left leg motor drift (0-4):   6B: Right leg motor drift (0-4):   7: Limb ataxia (0-2):   SENSORY:  8: Sensation (0-2):   SPEECH:  9: Language (0-3):   10: Dysarthria (0-2): 1  EXTINCTION:  11: Extinction/inattention (0-2): 1    TOTAL SCORE: 6    prehospital mRS= 0      LABS:                         9.0    10.98 )-----------( 153      ( 29 May 2023 04:50 )             27.3       05-29    136  |  104  |  27.2<H>  ----------------------------<  116<H>  3.5   |  21.0<L>  |  0.68    Ca    8.3<L>      29 May 2023 05:30  Phos  3.4     05-29  Mg     1.8     05-29    TPro  5.9<L>  /  Alb  3.0<L>  /  TBili  0.6  /  DBili  x   /  AST  20  /  ALT  21  /  AlkPhos  70  05-29      PT/INR - ( 29 May 2023 07:00 )   PT: 13.3 sec;   INR: 1.14 ratio         PTT - ( 29 May 2023 07:00 )  PTT:28.8 sec    Lipid Profile (05.29.23 @ 04:00)    Cholesterol, Serum: 60 mg/dL   Triglycerides, Serum: 34 mg/dL   HDL Cholesterol, Serum: 27 mg/dL   Non HDL Cholesterol: 33    LDL Cholesterol Calculated: 26 mg/dL    A1C with Estimated Average Glucose (05.29.23 @ 04:00)    A1C with Estimated Average Glucose Result: 5.7: Run on Special Rack %    RADIOLOGY & ADDITIONAL STUDIES (independently reviewed unless otherwise noted):   see PACS                                [facility] Stroke Team    CC: left sided weakness     HPI:  89F with PMH afib recently taken off AC who presented with Right  gaze deviation and Left sided weakness. Last known well 1900 5/28/23, was found by  at 2100 that day with symptoms. [place] [facility] initiated, NIH 26, found to have Right M1 occlusion on CTA, additionally noted basilar thrombus per Dr. Perez . Patient was given Tenecteplase at 23:33 5/28/23 and was transferred to [facility] for mechanical thrombectomy. On arrival, patient aphasic, unable to perform ROS.   NIH 26, mRS 0 on admission    PAST MEDICAL & SURGICAL HISTORY:  Afib    MEDICATIONS  (STANDING):  chlorhexidine 2% Cloths 1 Application(s) Topical daily  potassium chloride  10 mEq/100 mL IVPB 10 milliEquivalent(s) IV Intermittent every 1 hour  sodium chloride 0.9%. 1000 milliLiter(s) (75 mL/Hr) IV Continuous <Continuous>    MEDICATIONS  (PRN):  hydrALAZINE Injectable 10 milliGRAM(s) IV Push every 2 hours PRN SBP >140  labetalol Injectable 10 milliGRAM(s) IV Push every 2 hours PRN SBP >140    Allergies  No Known Allergies    SOCIAL HISTORY: denies   no tob,   no alcohol   no drugs    FAMILY HISTORY:    ROS: 14 point ROS negative other than what is present in HPI or below    Vital Signs Last 24 Hrs  T(C): 36.6 (29 May 2023 07:10), Max: 36.8 (29 May 2023 06:10)  T(F): 97.9 (29 May 2023 07:10), Max: 98.2 (29 May 2023 06:10)  HR: 71 (29 May 2023 07:10) (64 - 102)  BP: 110/50 (29 May 2023 07:10) (96/50 - 124/57)  BP(mean): 70 (29 May 2023 07:10) (55 - 103)  RR: 15 (29 May 2023 07:10) (15 - 22)  SpO2: 100% (29 May 2023 07:10) (93% - 100%)    General: NAD    Detailed Neurologic Exam:    Mental status: The patient is awake and alert, oriented to self, place, states end of may, disoriented to year.  The patient is able to name objects w/ choices, notes function of a pen, follows simple commands, repeats sentences.    Cranial nerves: Dysarthria, left facial palsy, Pupils equal 2mm and react symmetrically to light. VF appear overall full to counting fingers, blinks to threat . Extraocular motion is full with no nystagmus. There is no ptosis. Tongue midline.     Motor: There is normal bulk and tone.  There is no tremor.  Strength is 5/5 in the right arm and  4/5 leg.   Strength is 3/5 in the left arm and 4-/5 leg. Drift in arm to bed in 10 seconds, no drift in leg.    Sensation: Intact to light touch and pin in 4 extremities, left sensory extinction     Cerebellar: There is no dysmetria on finger to nose testing.    Gait : deferred    [facility] SS:  DATE: 5/29/23  TIME: 0822  1A: Level of consciousness (0-3):   1B: Questions (0-2): 1  1C: Commands (0-2):   2: Gaze (0-2):   3: Visual fields (0-3):   4: Facial palsy (0-3): 1  MOTOR:  5A: Left arm motor drift (0-4): 2  5B: Right arm motor drift (0-4):   6A: Left leg motor drift (0-4):   6B: Right leg motor drift (0-4):   7: Limb ataxia (0-2):   SENSORY:  8: Sensation (0-2):   SPEECH:  9: Language (0-3):   10: Dysarthria (0-2): 1  EXTINCTION:  11: Extinction/inattention (0-2): 1    TOTAL SCORE: 6    prehospital mRS= 0      LABS:                         9.0    10.98 )-----------( 153      ( 29 May 2023 04:50 )             27.3       05-29    136  |  104  |  27.2<H>  ----------------------------<  116<H>  3.5   |  21.0<L>  |  0.68    Ca    8.3<L>      29 May 2023 05:30  Phos  3.4     05-29  Mg     1.8     05-29    TPro  5.9<L>  /  Alb  3.0<L>  /  TBili  0.6  /  DBili  x   /  AST  20  /  ALT  21  /  AlkPhos  70  05-29      PT/INR - ( 29 May 2023 07:00 )   PT: 13.3 sec;   INR: 1.14 ratio         PTT - ( 29 May 2023 07:00 )  PTT:28.8 sec    Lipid Profile (05.29.23 @ 04:00)    Cholesterol, Serum: 60 mg/dL   Triglycerides, Serum: 34 mg/dL   HDL Cholesterol, Serum: 27 mg/dL   Non HDL Cholesterol: 33    LDL Cholesterol Calculated: 26 mg/dL    A1C with Estimated Average Glucose (05.29.23 @ 04:00)    A1C with Estimated Average Glucose Result: 5.7: Run on Special Rack %    RADIOLOGY & ADDITIONAL STUDIES (independently reviewed unless otherwise noted):   see PACS

## 2023-05-29 NOTE — SWALLOW BEDSIDE ASSESSMENT ADULT - SLP GENERAL OBSERVATIONS
Pt received sleeping in bed, arousable with cues however keeping eyes closed for majority of session, Sa02 96-99% on room air, reduced command following/verbal responses

## 2023-05-29 NOTE — DISCHARGE NOTE NURSING/CASE MANAGEMENT/SOCIAL WORK - PATIENT PORTAL LINK FT
You can access the FollowMyHealth Patient Portal offered by [facility] by registering at the following website: [email or web address]. By joining [employer]’s FollowMyHealth portal, you will also be able to view your health information using other applications (apps) compatible with our system.

## 2023-05-29 NOTE — SWALLOW BEDSIDE ASSESSMENT ADULT - PHARYNGEAL PHASE
Within functional limits Decreased laryngeal elevation/Throat clear post oral intake +delayed coughing; ?secondary to cup sips vs. tsp sips vs. both/Decreased laryngeal elevation no overt signs of penetration/aspiration however pt at high aspiration risk due to oral holding, ? impact of cognition vs. lethargy/Decreased laryngeal elevation

## 2023-05-29 NOTE — PROGRESS NOTE ADULT - SUBJECTIVE AND OBJECTIVE BOX
Preliminary note, official note pending attending review/signature.                               [facility] Stroke Team    CC: left sided weakness     HPI:  89F with PMH afib recently taken off AC who presented with Right  gaze deviation and Left sided weakness. Last known well 1900 5/28/23, was found by  at 2100 that day with symptoms. Patient taken to [facility], code stroke initiated, NIH 26, found to have Right M1 occlusion on CTA. Patient was given TNK at 23:33 5/28/23 and was transferred to [facility] for mechanical thrombectomy. On arrival, patient aphasic, unable to perform ROS.   NIH 26, mRS 0 on admission    PAST MEDICAL & SURGICAL HISTORY:  Afib    MEDICATIONS  (STANDING):  chlorhexidine 2% Cloths 1 Application(s) Topical daily  potassium chloride  10 mEq/100 mL IVPB 10 milliEquivalent(s) IV Intermittent every 1 hour  sodium chloride 0.9%. 1000 milliLiter(s) (75 mL/Hr) IV Continuous <Continuous>    MEDICATIONS  (PRN):  hydrALAZINE Injectable 10 milliGRAM(s) IV Push every 2 hours PRN SBP >140  labetalol Injectable 10 milliGRAM(s) IV Push every 2 hours PRN SBP >140    Allergies  No Known Allergies    SOCIAL HISTORY: denies   no tob,   no alcohol   no drugs    FAMILY HISTORY:    ROS: 14 point ROS negative other than what is present in HPI or below    Vital Signs Last 24 Hrs  T(C): 36.6 (29 May 2023 07:10), Max: 36.8 (29 May 2023 06:10)  T(F): 97.9 (29 May 2023 07:10), Max: 98.2 (29 May 2023 06:10)  HR: 71 (29 May 2023 07:10) (64 - 102)  BP: 110/50 (29 May 2023 07:10) (96/50 - 124/57)  BP(mean): 70 (29 May 2023 07:10) (55 - 103)  RR: 15 (29 May 2023 07:10) (15 - 22)  SpO2: 100% (29 May 2023 07:10) (93% - 100%)    General: NAD    Detailed Neurologic Exam:    Mental status: The patient is awake and alert, oriented to self, place, states end of may, disoriented to year.  The patient is able to name objects w/ choices, notes function of a pen, follows simple commands, repeats sentences.    Cranial nerves: Dysarthria, left facial palsy, Pupils equal 2mm and react symmetrically to light. VF appear overall full to counting fingers, blinks to threat . Extraocular motion is full with no nystagmus. There is no ptosis. Tongue midline.     Motor: There is normal bulk and tone.  There is no tremor.  Strength is 5/5 in the right arm and  4/5 leg.   Strength is 3/5 in the left arm and 4-/5 leg. Drift in arm to bed in 10 seconds, no drift in leg.    Sensation: Intact to light touch and pin in 4 extremities, left sensory extinction     Cerebellar: There is no dysmetria on finger to nose testing.    Gait : deferred    [facility] SS:  DATE: 5/29/23  TIME: 0822  1A: Level of consciousness (0-3):   1B: Questions (0-2): 1  1C: Commands (0-2):   2: Gaze (0-2):   3: Visual fields (0-3):   4: Facial palsy (0-3): 1  MOTOR:  5A: Left arm motor drift (0-4): 2  5B: Right arm motor drift (0-4):   6A: Left leg motor drift (0-4):   6B: Right leg motor drift (0-4):   7: Limb ataxia (0-2):   SENSORY:  8: Sensation (0-2):   SPEECH:  9: Language (0-3):   10: Dysarthria (0-2): 1  EXTINCTION:  11: Extinction/inattention (0-2): 1    TOTAL SCORE: 6    prehospital mRS= 0      LABS:                         9.0    10.98 )-----------( 153      ( 29 May 2023 04:50 )             27.3       05-29    136  |  104  |  27.2<H>  ----------------------------<  116<H>  3.5   |  21.0<L>  |  0.68    Ca    8.3<L>      29 May 2023 05:30  Phos  3.4     05-29  Mg     1.8     05-29    TPro  5.9<L>  /  Alb  3.0<L>  /  TBili  0.6  /  DBili  x   /  AST  20  /  ALT  21  /  AlkPhos  70  05-29      PT/INR - ( 29 May 2023 07:00 )   PT: 13.3 sec;   INR: 1.14 ratio         PTT - ( 29 May 2023 07:00 )  PTT:28.8 sec    Lipid Profile (05.29.23 @ 04:00)    Cholesterol, Serum: 60 mg/dL   Triglycerides, Serum: 34 mg/dL   HDL Cholesterol, Serum: 27 mg/dL   Non HDL Cholesterol: 33    LDL Cholesterol Calculated: 26 mg/dL    A1C with Estimated Average Glucose (05.29.23 @ 04:00)    A1C with Estimated Average Glucose Result: 5.7: Run on Special Rack %    RADIOLOGY & ADDITIONAL STUDIES (independently reviewed unless otherwise noted):

## 2023-05-29 NOTE — H&P ADULT - PA/NP COMMENTS
CRITICAL CARE TIME SPENT: 30 minutes  Time spent evaluating/treating patient with medical issues that pose a high risk for life threatening deterioration and/or end-organ damage, reviewing data including but limited to vital signs/labs/imaging/imaging results, clinical examination, discussion of treatment/plan with multidisciplinary team, discussing plan/goals of care with patient/family. Non-inclusive of procedure time.

## 2023-05-29 NOTE — CONSULT NOTE ADULT - ASSESSMENT
A/P: Patient is a 89 y/o F with a PMHx of Afib (taken off AC 9 days ago s/p fall) s/p MDT PPM who [place] [facility] [place] acute right gaze deviation, aphasia, and left sided weakness. Patient was last well known 05/28/23 at 1900, and was found at 2100 with the aforementioned symptoms, and was [place] [facility] Patient found to have an acute right M1 occlusion, s/p TNK at 23:33, and was transferred to [facility] for mechanical thrombectomy. Patient underwent neurointervention, and is currently in the ICU. Patient still aphasic, but able to provide some history. States she had no cardiac issues other then the Afib and pacemaker. Patient functions independently prior to CVA able to complete ADLs without any assistance. Patient denies fevers, chills, CP, SOB, N/V/D, or dizziness.   Troponin negative x 1
ASSESSMENT: 89F with PMH afib recently taken off AC who presented with Right  gaze deviation and Left sided weakness. Last known well 1900 5/28/23, was found by  at 2100 that day with symptoms. Patient taken to [facility], code stroke initiated, NIH 26, found to have Right M1 occlusion on CT Angiogram, post tenecteplase additionally noted basilar thrombus per Dr. Perez which resolved post tenecteplase. Subsequently underwent mechanical thrombectomy with TICI 3 recanalization.  Etiology likely cardioembolic in setting of atrial fibrillation .    NEURO: meurologically appears improving post thrombectomy, Continue close monitoring for neurologic deterioration , stroke neuro checks  per protocol ,  SBP goal post thrombectomy 110-140mmHg , titrate statin to LDL goal less than 70, MRI Brain w/o, MRA Head w/o and Neck w/contrast. Physical therapy/OT/Speech eval/treatment.     ANTITHROMBOTIC THERAPY: on hold post thrombectomy/tenecteplase protocol, pending 24 hour follow up CT head imaging post protocol anticipate initiation of ASA 81mg daily if not contraindicated.  Further Anticoagulation timeline  recommendations pending candidacy of AC (if benefits>risks, clarification re: indication for stopping) and findings of follow up neuro imaging (MRI).      PULMONARY: protecting airway, saturating well     CARDIOVASCULAR: check TTE, cardiac monitoring                             GASTROINTESTINAL:  dysphagia screen  pass/fail     Diet:     RENAL: BUN/Cr elevated , monitor urine output, maintain adequate hydration       Na Goal:  135-145    HEMATOLOGY: H/H with anemia, 153, patient should have all age and risk appropriate malignancy screenings with PCP or sooner if clinically suspected      DVT ppx: SCD for now, pharmacological dvt ppx pending findings of post protocol neuro imaging after 24 hrs.     ID: afebrile,  leukocytosis, + UTI, cx and abx per ICU.     OTHER:  condition and plan of care d/w patient, questions and concerns addressed.     DISPOSITION: Rehab or home depending on PT eval once stable and workup is complete      CORE MEASURES:        Admission NIHSS:  26     Tenecteplase : [x] YES [] NO      LDL/HDL/A1C: 26/27/5.7     Depression Screen- if depression hx and/or present      Statin Therapy: as noted      Dysphagia Screen: [] PASS [] FAIL pending      Smoking [] YES [x] NO      Afib [x] YES [] NO     Stroke Education [x] YES [] NO    Obtain screening lower extremity venous ultrasound in patients who meet 1 or more of the following criteria as patient is high risk for DVT/PE on admission:   [] History of DVT/PE  []Hypercoagulable states (Factor V Leiden, Cancer, OCP, etc. )  []Prolonged immobility (hemiplegia/hemiparesis/post operative or any other extended immobilization)  [] Transferred from outside facility (Rehab or Long term care)  [] Age </= to 50

## 2023-05-29 NOTE — H&P ADULT - ASSESSMENT
89F with PMH afib recently taken off AC who presented with L sided weakness, aphasia, and L facial droop, found to have R M1 occlusion. TNK given at 23:33. [place] [facility] [place] [facility] for mechanical thrombectomy.       Plan:  Neuro:  - Q1 hour Neuro checks, Q1 hour Vitals  - HOB 30 degrees, Neck midline position  - Maintain normothermia, PO acetaminophen for temp>38 C or pain  - Imaging Reviewed  - Repeat imagin hour CTH, MRI  - Monitor groin  - Pain management & Sedation: tylenol PRN  - Turn and Position Q2  /  Activity ad alvina, with assistance  	  CV:  - SBP Goal 100-140  - BP regimen: PRN hydralazine / labetalol   - TTE  - Lipid profile     Pulm:  - Supplemental O2 PRN to maintain Spo2>92%  - Chest PT, OOB, Pulmonary Toilet    GI:  - Nutrition: NPO   - Bowel regimen when PO access established   	  Gu:  - Voiding  - Fluids: NS @ 75   - I&O Q1 hour  - Monitor Electrolytes & Renal Function    Heme:  - Monitor H&H  - Chemical DVT prophylaxis: * Chemical DVT prophylaxis is contraindicated due to risk of bleeding  - Mechanical DVT Prophylaxis: Maintain B/L LE sequential compression devices  	  ID:  - Monitor WBC and Temperature  		  Endo  - Monitor BGL, maintain <180  - MISS  - A1C  - TSH

## 2023-05-29 NOTE — DISCHARGE NOTE NURSING/CASE MANAGEMENT/SOCIAL WORK - NSDCPEFALRISK_GEN_ALL_CORE
For information on Fall & Injury Prevention, visit: [email or web address] OR  [email or web address] OR  https://www.cdc.gov/steadi/patient.html

## 2023-05-29 NOTE — H&P ADULT - CRITICAL CARE ATTENDING COMMENT
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 S/p Tenecteplase IV and TICI MT 5/29.  PMH Afib, off AC recently due to a fall, SSS  s/p MDT PPM, hypothyroidism.    This am noted with worsening neuro exam with episode of hypotension; improved with improving BP; received bolus prior, started on low dose David ggt.   Eventually, with normalization of BP - back to baseline good exam - AOx2, interacting with the family, no gaze preference, moderate dysarthria, LLE drift.    Plan:  neurochecks, post-thrombectomy care  stability CTh in 24 hrs post tPA  maintain -180 and DBP <105  maintai Osats >92%, start PRN q6hrs albuterol nebs, add Adavir  stroke core measures, MRI w/o contrast pending PPM clearance  cont IVf post-contrast, plan to d/c tonight; monitor Electrolytes and UOP  hold antiplts and AC until stability CTh; will eventually need AC or LAC, Cardiology involved   start statin  rest as above      Pt is critically ill and at high risk of rapid deterioration/death due to abovementioned conditions.   Still requires critical care interventions - ongoing frequent evaluations, interventions and management adjustment by the Attending and ICU team, - and included review of relevant history, clinical examination, review of data and images, discussion of treatment with the multidisciplinary team and any consultants involved in this patient’s care. 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 S/p Tenecteplase IV and TICI MT 5/29.  PMH Afib, off AC recently due to a fall, SSS  s/p MDT PPM, hypothyroidism.    This am noted with worsening neuro exam with episode of hypotension; improved with improving BP; received bolus prior, started on low dose David ggt.   Eventually, with normalization of BP - back to baseline good exam - AOx2, interacting with the family, no gaze preference, moderate dysarthria, LLE drift.  Also, positive UA with serum leukocytosis and episodes of hypotension, UCx sent, started on Ceftriaxone.    Plan:  neurochecks, post-thrombectomy care  stability CTh in 24 hrs post tPA  maintain -180 and DBP <105  maintai Osats >92%, start PRN q6hrs albuterol nebs, add Adavir  stroke core measures, MRI w/o contrast pending PPM clearance  cont IVf post-contrast, plan to d/c tonight; monitor Electrolytes and UOP  hold antiplts and AC until stability CTh; will eventually need AC or LAC, Cardiology involved   start statin  cont Ceftriaxone for UTI  rest as above      Pt is critically ill and at high risk of rapid deterioration/death due to abovementioned conditions.   Still requires critical care interventions - ongoing frequent evaluations, interventions and management adjustment by the Attending and ICU team, - and included review of relevant history, clinical examination, review of data and images, discussion of treatment with the multidisciplinary team and any consultants involved in this patient’s care.

## 2023-05-29 NOTE — SWALLOW BEDSIDE ASSESSMENT ADULT - SLP PERTINENT HISTORY OF CURRENT PROBLEM
Per chart: 89F with PMH afib recently taken off AC who presented with Right  gaze deviation and Left sided weakness. Last known well 1900 5/28/23, was found by  at 2100 that day with symptoms. Patient taken to [facility], code stroke initiated, NIH 26, found to have Right M1 occlusion on CTA. Patient was given TNK at 23:33 5/28/23 and was transferred to [facility] for mechanical thrombectomy. On arrival, patient aphasic, unable to perform ROS.

## 2023-05-29 NOTE — CHART NOTE - NSCHARTNOTEFT_GEN_A_CORE
Neurointerventional Surgery  Pre-Procedure Note     This is a 89y ____ hand dominant Female    HPI:      Allergies: No Known Allergies      PMH/PSH:  PAST MEDICAL & SURGICAL HISTORY:      Social History:   Social History:    FAMILY HISTORY:      Current Medications:   sodium chloride 0.9%. 1000 milliLiter(s) IV Continuous <Continuous>      Physical Exam:  Constitutional: NAD, lying in bed  Neuro  * Mental Status:  GCS 15: Awake, alert, oriented to conversation. No aphasia or difficulty speaking. No dysarthria. Able to name objects and their function.  * Cranial Nerves: Cnii-Cnxii grossly intact. PERRL, EOMI, tongue midline, no gaze deviation  * Motor: RUE 5/5, LUE 5/5, RLE 5/5, LLE 5/5, no drift or dysmetria  * Sensory: Sensation intact to light touch  * Reflexes: not assessed   Cardiovascular: Regular rate and rhythm.  Eyes: See neurologic examination with detailed examination of eyes.  ENT: No JVD, Trachea Midline  Respiratory: non labored breathing   Gastrointestinal: Soft, nontender, nondistended.  Genitourinary: [ ] Nolen, [ x ] No Nolen.   Musculoskeletal: No muscle wasting noted, (See neurologic assessment for full muscle strength assessment) No pretibial edema appreciated, no appreciable calf tenderness.  Skin:  no wounds, no redness, no abrasions noted  Hematologic / Lymph / Immunologic: No bleeding from IV sites or wounds, No lymphadenopathy, No Hives or allergic type skin lesions    NIH SS:  DATE:  TIME:  1A: Level of consciousness (0-3): 0  1B: Questions (0-2): 0    1C: Commands (0-2): 0  2: Gaze (0-2): 0  3: Visual fields (0-3): 0  4: Facial palsy (0-3): 0  MOTOR:  5A: Left arm motor drift (0-4): 0  5B: Right arm motor drift (0-4): 0  6A: Left leg motor drift (0-4): 0  6B: Right leg motor drift (0-4): 0  7: Limb ataxia (0-2): 0  SENSORY:  8: Sensation (0-2): 0  SPEECH:  9: Language (0-3): 0  10: Dysarthria (0-2): 0  EXTINCTION:  11: Extinction/inattention (0-2): 0    TOTAL SCORE:     Labs:                         Assessment/Plan:   This is a 89y  year old right / left hand dominant Female  presents with   Patient presents to neuro-IR for selective cerebral angiography.     Procedure, goals, risks, benefits and alternatives  were discussed with patient and (patient's family).  All questions were answered.  Risks include but are not limited to stroke, vessel injury, hemorrhage, and or groin hematoma.  Patient demonstrates understanding  of all risks involved with this procedure and wishes to continue.   Appropriate  consent was obtained from patient and consent is in the patient's chart. Neurointerventional Surgery  Pre-Procedure Note     This is a 89y R hand dominant Female    HPI:  89F with PMH afib recently taken off AC who presented with R gaze deviation and L sided weakness. Last known well 1900, was found by  at 2100 with symptoms. Patient taken to [facility], code stroke initiated, NIH 26, found to have RM1 occlusion on CTA. Patient was given TNK at 23:33 and was transferred to [facility] for mechanical thrombectomy. On arrival, patient aphasic, unable to perform ROS.       Allergies: No Known Allergies      PAST MEDICAL & SURGICAL HISTORY:  Afib      Current Medications:   sodium chloride 0.9%. 1000 milliLiter(s) IV Continuous <Continuous>      Physical Exam:  Constitutional: NAD  Neuro  * Mental Status: EO spontaneously, RUE/RLE moving spontaneously, not speaking, not following commands, global aphasia   * Cranial Nerves: PERRL, R gaze deviation unable to overcome, L facial droop  * Motor: RUE AG with drift, RLE AG with drift, LUE/LLE WD   * Sensory: Sensation grossly intact to noxious stimuli   * Reflexes: not assessed   Cardiovascular: irregular rate and rhythm   Eyes: See neurologic examination with detailed examination of eyes.  ENT: No JVD, Trachea Midline  Respiratory: non labored breathing   Gastrointestinal: Soft, nontender, nondistended.  Genitourinary: [ ] Nolen, [ x ] No Nolen.   Musculoskeletal: No muscle wasting noted, (See neurologic assessment for full muscle strength assessment)   Skin:  no wounds, no redness, no abrasions noted  Hematologic / Lymph / Immunologic: No bleeding from IV sites or wounds    [facility] SS:  DATE: 5/29/23   TIME: 0110  1A: Level of consciousness (0-3): 0  1B: Questions (0-2): 2  1C: Commands (0-2): 2  2: Gaze (0-2): 2  3: Visual fields (0-3): 2  4: Facial palsy (0-3): 2  MOTOR:  5A: Left arm motor drift (0-4): 3  5B: Right arm motor drift (0-4): 2  6A: Left leg motor drift (0-4): 3  6B: Right leg motor drift (0-4): 2  7: Limb ataxia (0-2): 0  SENSORY:  8: Sensation (0-2): 0  SPEECH:  9: Language (0-3): 2  10: Dysarthria (0-2): 2  EXTINCTION:  11: Extinction/inattention (0-2): 2    TOTAL SCORE: 26      Labs:   From [facility]      Assessment/Plan:   This is a 89y  year old right hand dominant Female  presents with RM1 occlusion s/p TNK. Patient presents to neuro-IR for mechanical thrombectomy.    Procedure, goals, risks, benefits and alternatives  were discussed with patient's son Jose Elias Cherry [phone].  All questions were answered.  Risks include but are not limited to stroke, vessel injury, hemorrhage, and or groin hematoma.  Patient's son demonstrates understanding  of all risks involved with this procedure and wishes to continue.   Appropriate  consent was obtained from patient's son and consent is in the patient's chart.

## 2023-05-29 NOTE — PROGRESS NOTE ADULT - SUBJECTIVE AND OBJECTIVE BOX
Nurse to Nurse given to Ahmet RN  All questions and concerns addressed at this time  Pt belongings bagged and sent with pt   Patient is a 89y old  Female who presents with a chief complaint of stroke (29 May 2023 01:09)    HPI:  89F with PMH afib recently taken off AC who presented with R gaze deviation and L sided weakness. Last known well 1900, was found by  at 2100 with symptoms. Patient taken to [facility], code stroke initiated, NIH 26, found to have RM1 occlusion on CTA. Patient was given TNK at 23:33 and was transferred to [facility] for mechanical thrombectomy. On arrival, patient aphasic, unable to perform ROS.     NIH 26, mRS 0 on admission (29 May 2023 01:09)      Interval history:  Patient seen and examined by neuro IR team. Patient now POD0 from mechanical thrombectomy, TICI 3. Patient's exam has improved significantly from pre-procedure. No acute events reported by staff.       Vital Signs Last 24 Hrs  T(C): 36.8 (29 May 2023 06:40), Max: 36.8 (29 May 2023 06:10)  T(F): 98.2 (29 May 2023 06:40), Max: 98.2 (29 May 2023 06:10)  HR: 89 (29 May 2023 06:40) (64 - 102)  BP: 100/39 (29 May 2023 06:40) (96/50 - 124/57)  BP(mean): 58 (29 May 2023 06:40) (55 - 103)  RR: 20 (29 May 2023 06:40) (15 - 22)  SpO2: 99% (29 May 2023 06:40) (93% - 100%)      Physical Exam:  Constitutional: NAD, lying in bed  Neuro  * Mental Status: EO to voice, following commands, A&O x2 (name, place), some confusion vs aphasia, + dysarthria  * Cranial Nerves: PERRL, gaze midline, tongue midline, mild L facial   * Motor: RUE 5/5, LUE AG w/o drift, RLE 5/5, LLE AG w/o drift   * Sensory: Sensation grossly intact   * Reflexes: not assessed   Groin: soft, nontender, no hematoma, no ecchymoses       LABS:                        9.0    10.98 )-----------( 153      ( 29 May 2023 04:50 )             27.3     05-    136  |  104  |  27.2<H>  ----------------------------<  116<H>  3.5   |  21.0<L>  |  0.68    Ca    8.3<L>      29 May 2023 05:30  Phos  3.4     05-  Mg     1.8     -    TPro  5.9<L>  /  Alb  3.0<L>  /  TBili  0.6  /  DBili  x   /  AST  20  /  ALT  21  /  AlkPhos  70  05-29    PT/INR - ( 29 May 2023 04:00 )   PT: 18.8 sec;   INR: 1.61 ratio    PTT - ( 29 May 2023 04:00 )  PTT:32.1 sec    Urinalysis Basic - ( 29 May 2023 07:00 )  Color: Yellow / Appearance: Clear / S.005 / pH: x  Gluc: x / Ketone: Trace  / Bili: Negative / Urobili: Negative   Blood: x / Protein: 30 mg/dL / Nitrite: Positive   Leuk Esterase: Small / RBC: 3-5 /HPF / WBC >50 /HPF   Sq Epi: x / Non Sq Epi: x / Bacteria: Few      Medications:  MEDICATIONS  (STANDING):  chlorhexidine 2% Cloths 1 Application(s) Topical daily  potassium chloride  10 mEq/100 mL IVPB 10 milliEquivalent(s) IV Intermittent every 1 hour  sodium chloride 0.9%. 1000 milliLiter(s) (75 mL/Hr) IV Continuous <Continuous>    MEDICATIONS  (PRN):  hydrALAZINE Injectable 10 milliGRAM(s) IV Push every 2 hours PRN SBP >140  labetalol Injectable 10 milliGRAM(s) IV Push every 2 hours PRN SBP >140      RADIOLOGY & ADDITIONAL STUDIES:  No new neuro IR imaging to review

## 2023-05-29 NOTE — H&P ADULT - NSHPLABSRESULTS_GEN_ALL_CORE
From [facility]      5/28/23 CTH:  IMPRESSION: No intracranial bleed, mass effect or acute territorial infarct.    Dr. Elliott notified Dr. Dewitt on 5/28/2023 at 11:06 PM Eastern standard time with read back.    --- End of Report ---    HERBERT ELLIOTT MD; Attending Radiologist  This document has been electronically signed. May 28 2023 11:21PM      5/28/23 CTA/CTP:  IMPRESSION:    CT PERFUSION:  Perfusion imaging predicted core infarct of 0 ml within the right MCA Territory. Based on the perfusion mismatch, there is a predicted volume of 140 mL's of critically hypoperfused tissue at risk in the right MCA territory.    CTA BRAIN:  Occlusion of the proximal M1 segment of the right MCA.    Severe stenosis of the basilar tip.    CTA NECK: Patent anterior and posterior circulations without significant ICA stenosis as per vascular criteria.    Nonspecific 1.2 cm groundglass nodule of the right posterior lung apex. Follow-up posttreatment CT chest can be obtained in 3 months.    Dr. Elliott notified Dr. Dewitt of the acute findings on 5/28/2023 at 11:23 PM Eastern standard time with read back    --- End of Report ---    HERBERT ELLIOTT MD; Attending Radiologist  This document has been electronically signed. May 28 2023 11:48PM

## 2023-05-29 NOTE — PROGRESS NOTE ADULT - ASSESSMENT
Assessment:  89F with PMH afib recently taken off AC who presented with L sided weakness, aphasia, and L facial droop, found to have R M1 occlusion. TNK given at 23:33. [facility] [facility] [place] [facility] for mechanical thrombectomy on 5/29, TICI 3 revascularization.  - POD0   - Exam greatly improved      Plan:  - Discussed with Dr. Powers  - Post thrombectomy neuro / vascular checks  - SBP goal 100-140 to prevent reperfusion hemorrhage  - 24 hour CTH for TNK  - MRI  - Cardiology consult for afib, AC requirement in setting of stroke when off AC  - TTE pending  - A1C 5.7%, LDL 26, TSH 1.32  - PT/OT/ST  - Further care per primary team  - PA only visit

## 2023-05-29 NOTE — CONSULT NOTE ADULT - PROBLEM SELECTOR RECOMMENDATION 2
- Acute R M1 occlusion in the setting of Afib off AC.   - S/p neurointervention.   - Start aspirin and statin when for PO.   - Obtain TTE with bubble study.  - Continue telemetry monitoring.

## 2023-05-29 NOTE — SWALLOW BEDSIDE ASSESSMENT ADULT - SWALLOW EVAL: RECOMMENDED FEEDING/EATING TECHNIQUES
allow for swallow between intakes/check mouth frequently for oral residue/pocketing/crush medication (when feasible)/maintain upright posture during/after eating for 30 mins/position upright (90 degrees)/small sips/bites

## 2023-05-30 LAB
ANION GAP SERPL CALC-SCNC: 10 MMOL/L — SIGNIFICANT CHANGE UP (ref 5–17)
BUN SERPL-MCNC: 20.8 MG/DL — HIGH (ref 8–20)
CALCIUM SERPL-MCNC: 8.4 MG/DL — SIGNIFICANT CHANGE UP (ref 8.4–10.5)
CHLORIDE SERPL-SCNC: 108 MMOL/L — SIGNIFICANT CHANGE UP (ref 96–108)
CO2 SERPL-SCNC: 23 MMOL/L — SIGNIFICANT CHANGE UP (ref 22–29)
CREAT SERPL-MCNC: 0.62 MG/DL — SIGNIFICANT CHANGE UP (ref 0.5–1.3)
EGFR: 84 ML/MIN/1.73M2 — SIGNIFICANT CHANGE UP
FERRITIN SERPL-MCNC: 96 NG/ML — SIGNIFICANT CHANGE UP (ref 15–150)
FOLATE SERPL-MCNC: 16.3 NG/ML — SIGNIFICANT CHANGE UP
GLUCOSE SERPL-MCNC: 100 MG/DL — HIGH (ref 70–99)
HCT VFR BLD CALC: 27.5 % — LOW (ref 34.5–45)
HGB BLD-MCNC: 8.9 G/DL — LOW (ref 11.5–15.5)
IRON SATN MFR SERPL: 10 % — LOW (ref 14–50)
IRON SATN MFR SERPL: 26 UG/DL — LOW (ref 37–145)
MAGNESIUM SERPL-MCNC: 2.2 MG/DL — SIGNIFICANT CHANGE UP (ref 1.6–2.6)
MCHC RBC-ENTMCNC: 26.4 PG — LOW (ref 27–34)
MCHC RBC-ENTMCNC: 32.4 GM/DL — SIGNIFICANT CHANGE UP (ref 32–36)
MCV RBC AUTO: 81.6 FL — SIGNIFICANT CHANGE UP (ref 80–100)
PHOSPHATE SERPL-MCNC: 3.3 MG/DL — SIGNIFICANT CHANGE UP (ref 2.4–4.7)
PLATELET # BLD AUTO: 183 K/UL — SIGNIFICANT CHANGE UP (ref 150–400)
POTASSIUM SERPL-MCNC: 4.1 MMOL/L — SIGNIFICANT CHANGE UP (ref 3.5–5.3)
POTASSIUM SERPL-SCNC: 4.1 MMOL/L — SIGNIFICANT CHANGE UP (ref 3.5–5.3)
RBC # BLD: 3.37 M/UL — LOW (ref 3.8–5.2)
RBC # FLD: 15.2 % — HIGH (ref 10.3–14.5)
SODIUM SERPL-SCNC: 141 MMOL/L — SIGNIFICANT CHANGE UP (ref 135–145)
TIBC SERPL-MCNC: 267 UG/DL — SIGNIFICANT CHANGE UP (ref 220–430)
TRANSFERRIN SERPL-MCNC: 187 MG/DL — LOW (ref 192–382)
VIT B12 SERPL-MCNC: 936 PG/ML — SIGNIFICANT CHANGE UP (ref 232–1245)
WBC # BLD: 14.33 K/UL — HIGH (ref 3.8–10.5)
WBC # FLD AUTO: 14.33 K/UL — HIGH (ref 3.8–10.5)

## 2023-05-30 PROCEDURE — 93308 TTE F-UP OR LMTD: CPT | Mod: 26

## 2023-05-30 PROCEDURE — 99233 SBSQ HOSP IP/OBS HIGH 50: CPT | Mod: FS

## 2023-05-30 PROCEDURE — 99291 CRITICAL CARE FIRST HOUR: CPT

## 2023-05-30 PROCEDURE — 99223 1ST HOSP IP/OBS HIGH 75: CPT | Mod: GC

## 2023-05-30 PROCEDURE — 73560 X-RAY EXAM OF KNEE 1 OR 2: CPT | Mod: 26,LT

## 2023-05-30 PROCEDURE — 70450 CT HEAD/BRAIN W/O DYE: CPT | Mod: 26

## 2023-05-30 PROCEDURE — 99233 SBSQ HOSP IP/OBS HIGH 50: CPT

## 2023-05-30 RX ORDER — IRON SUCROSE 20 MG/ML
200 INJECTION, SOLUTION INTRAVENOUS EVERY 24 HOURS
Refills: 0 | Status: COMPLETED | OUTPATIENT
Start: 2023-05-30 | End: 2023-06-01

## 2023-05-30 RX ORDER — FERROUS SULFATE 325(65) MG
325 TABLET ORAL DAILY
Refills: 0 | Status: DISCONTINUED | OUTPATIENT
Start: 2023-06-02 | End: 2023-06-06

## 2023-05-30 RX ORDER — ASPIRIN/CALCIUM CARB/MAGNESIUM 324 MG
81 TABLET ORAL DAILY
Refills: 0 | Status: DISCONTINUED | OUTPATIENT
Start: 2023-05-30 | End: 2023-06-06

## 2023-05-30 RX ORDER — ENOXAPARIN SODIUM 100 MG/ML
40 INJECTION SUBCUTANEOUS EVERY 24 HOURS
Refills: 0 | Status: DISCONTINUED | OUTPATIENT
Start: 2023-05-30 | End: 2023-06-01

## 2023-05-30 RX ADMIN — CEFTRIAXONE 1000 MILLIGRAM(S): 500 INJECTION, POWDER, FOR SOLUTION INTRAMUSCULAR; INTRAVENOUS at 14:25

## 2023-05-30 RX ADMIN — Medication 81 MILLIGRAM(S): at 12:14

## 2023-05-30 RX ADMIN — SODIUM CHLORIDE 75 MILLILITER(S): 9 INJECTION INTRAMUSCULAR; INTRAVENOUS; SUBCUTANEOUS at 04:21

## 2023-05-30 RX ADMIN — BUDESONIDE AND FORMOTEROL FUMARATE DIHYDRATE 2 PUFF(S): 160; 4.5 AEROSOL RESPIRATORY (INHALATION) at 20:41

## 2023-05-30 RX ADMIN — Medication 112 MICROGRAM(S): at 05:05

## 2023-05-30 RX ADMIN — ENOXAPARIN SODIUM 40 MILLIGRAM(S): 100 INJECTION SUBCUTANEOUS at 12:14

## 2023-05-30 RX ADMIN — BUDESONIDE AND FORMOTEROL FUMARATE DIHYDRATE 2 PUFF(S): 160; 4.5 AEROSOL RESPIRATORY (INHALATION) at 08:48

## 2023-05-30 RX ADMIN — IRON SUCROSE 200 MILLIGRAM(S): 20 INJECTION, SOLUTION INTRAVENOUS at 12:15

## 2023-05-30 RX ADMIN — Medication 125 MICROGRAM(S): at 05:06

## 2023-05-30 RX ADMIN — FAMOTIDINE 20 MILLIGRAM(S): 10 INJECTION INTRAVENOUS at 12:14

## 2023-05-30 RX ADMIN — SIMVASTATIN 10 MILLIGRAM(S): 20 TABLET, FILM COATED ORAL at 22:33

## 2023-05-30 RX ADMIN — POLYETHYLENE GLYCOL 3350 17 GRAM(S): 17 POWDER, FOR SOLUTION ORAL at 12:12

## 2023-05-30 RX ADMIN — SENNA PLUS 2 TABLET(S): 8.6 TABLET ORAL at 21:24

## 2023-05-30 NOTE — OCCUPATIONAL THERAPY INITIAL EVALUATION ADULT - LIVES WITH, PROFILE
Pt lives in a private home with her spouse (89 y/o, unclear level of assist spouse able to provide) and has a son, daughter in law and adult grand children who live in the city that may be able to assist prn. Pt has 2-3 steps to enter with ? handrails (pt unsure), 12 steps inside with handrail to bedroom/bathroom (pt states she can stay on first floor however, only a couch available to sleep on)./spouse

## 2023-05-30 NOTE — SPEECH LANGUAGE PATHOLOGY EVALUATION - SLP DIAGNOSIS
Pt's receptive language skills assessed to be WFL marked by ability to follow simple and complex commands, answer complex yes/no questions, and ID objects/pictures F:4. Pt presents w/ mild expressive language deficits characterized by slight word finding difficulties in structured fluency tasks however, question baseline versus true expressive language deficits from CVA. Pt able to express all wants/needs, participate in conversation, & complete automatic speech tasks, sentence formation and confrontation naming tasks w/ 100% accuracy independently.

## 2023-05-30 NOTE — PHYSICAL THERAPY INITIAL EVALUATION ADULT - PERTINENT HX OF CURRENT PROBLEM, REHAB EVAL
89F w/ PMHx afib recently taken off AC. Presented to [facility] with Left sided weakness, aphasia, and Left facial droop, found to have Right M1 occlusion on CTA. TNK given at 23:33 on 5/28. Pt was transferred from [facility] to [facility] for further care. Now s/p mechanical thrombectomy on 5/29 with TICI 3 revascularization

## 2023-05-30 NOTE — PROGRESS NOTE ADULT - SUBJECTIVE AND OBJECTIVE BOX
Patient is a 89y old  Female who presents with a chief complaint of stroke (30 May 2023 12:34)    HPI:  89F with PMH afib recently taken off AC who presented with R gaze deviation and L sided weakness. Last known well 1900, was found by  at 2100 with symptoms. Patient taken to [facility] [place], code stroke initiated, NIH 26, found to have RM1 occlusion on CTA. Patient was given TNK at 23:33 and was transferred to [facility] for mechanical thrombectomy. On arrival, patient aphasic, unable to perform ROS.     NIH 26, mRS 0 on admission  (29 May 2023 01:09)      Interval history:  Patient seen and examined by neuro IR team. Patient reports that she feels well, has no acute complaints at this time. Patient had 24 hour CTH which showed no hemorrhage. Patient downgraded from ICU to medicine today.       Vital Signs Last 24 Hrs  T(C): 36.7 (30 May 2023 15:46), Max: 37.7 (29 May 2023 18:30)  T(F): 98 (30 May 2023 15:46), Max: 99.9 (29 May 2023 18:30)  HR: 70 (30 May 2023 16:00) (60 - 90)  BP: 124/48 (30 May 2023 16:00) (102/39 - 139/62)  BP(mean): 70 (30 May 2023 16:00) (59 - 96)  RR: 18 (30 May 2023 16:00) (14 - 26)  SpO2: 100% (30 May 2023 16:00) (93% - 100%)    Parameters below as of 30 May 2023 16:00  Patient On (Oxygen Delivery Method): room air      Physical Exam:  Constitutional: NAD, lying in bed  Neuro  * Mental Status:  GCS 15: Awake, alert, oriented to conversation. Mild confusion. + dysarthria. Able to name objects and their function.  * Cranial Nerves: PERRL, EOMI, tongue midline, no gaze deviation, mild L facial   * Motor: RUE 5/5, LUE 4-/5, RLE 5/5, LLE 4/5, mild LUE drift   * Sensory: Sensation intact to light touch  * Reflexes: not assessed   Groin: small skin tear, no hematoma, no ecchymoses, soft, nontender       LABS:                        8.9    14.33 )-----------( 183      ( 30 May 2023 04:48 )             27.5     05    141  |  108  |  20.8<H>  ----------------------------<  100<H>  4.1   |  23.0  |  0.62    Ca    8.4      30 May 2023 04:48  Phos  3.3       Mg     2.2         TPro  5.9<L>  /  Alb  3.0<L>  /  TBili  0.6  /  DBili  x   /  AST  20  /  ALT  21  /  AlkPhos  70      PT/INR - ( 29 May 2023 07:00 )   PT: 13.3 sec;   INR: 1.14 ratio    PTT - ( 29 May 2023 07:00 )  PTT:28.8 sec    Urinalysis Basic - ( 29 May 2023 07:00 )  Color: Yellow / Appearance: Clear / S.005 / pH: x  Gluc: x / Ketone: Trace  / Bili: Negative / Urobili: Negative   Blood: x / Protein: 30 mg/dL / Nitrite: Positive   Leuk Esterase: Small / RBC: 3-5 /HPF / WBC >50 /HPF   Sq Epi: x / Non Sq Epi: x / Bacteria: Few    CULTURES:  Culture Results:   >100,000 CFU/ml Escherichia coli ( @ 10:00)      Medications:  MEDICATIONS  (STANDING):  aspirin  chewable 81 milliGRAM(s) Oral daily  budesonide 160 MICROgram(s)/formoterol 4.5 MICROgram(s) Inhaler 2 Puff(s) Inhalation two times a day  cefTRIAXone Injectable.      cefTRIAXone Injectable. 1000 milliGRAM(s) IV Push every 24 hours  chlorhexidine 2% Cloths 1 Application(s) Topical daily  digoxin     Tablet 125 MICROGram(s) Oral daily  enoxaparin Injectable 40 milliGRAM(s) SubCutaneous every 24 hours  famotidine    Tablet 20 milliGRAM(s) Oral daily  iron sucrose Injectable 200 milliGRAM(s) IV Push every 24 hours  levothyroxine 112 MICROGram(s) Oral daily  polyethylene glycol 3350 17 Gram(s) Oral daily  senna 2 Tablet(s) Oral at bedtime  simvastatin 10 milliGRAM(s) Oral at bedtime    MEDICATIONS  (PRN):  albuterol    0.083% 2.5 milliGRAM(s) Nebulizer every 6 hours PRN Shortness of Breath and/or Wheezing  hydrALAZINE Injectable 10 milliGRAM(s) IV Push every 2 hours PRN SBP >140  labetalol Injectable 10 milliGRAM(s) IV Push every 2 hours PRN SBP >140      RADIOLOGY & ADDITIONAL STUDIES:  < from: CT Head No Cont (23 @ 00:56) >  IMPRESSION:    1. Right anterior MCA acute infarction is suspected    2. Right PCA distribution remote infarction    3. Ischemic white matter disease and atrophy typical for age    4. En plaque meningioma right anterior cranial fossa floor    5. Intracranial atherosclerosis    --- End of Report ---    DIA PATTERSON MD; Attending Radiologist  This document has been electronically signed. May 30 2023  8:47AM    < end of copied text >

## 2023-05-30 NOTE — PROGRESS NOTE ADULT - ASSESSMENT
89 y/o F w/ PMH of AF (Eliquis stopped 5/23 by cardiologist due to freq falls), HTN, asthma/bronchiectasis, latent TB, hypothroidism, HLD, arthritis, chronic dizziness, macular degeneration. leadless pacemaker for SSS (placed 6/7/22), who presented on 5/28 to [facility] with aphasia and LUE weakness and found to have a Rt M1 occlusion.  Pt received TNK and transferred to [facility] neuroICU as a code biplane.  She was emergently taken to angio for thrombectomy where TICI 3 revascularization of the Rt M1 was obtained.  Pts neurologic exam has continued to improved.  Pt was noted to have worsening mentation when SBP <110 so she was maintained on светлана x 24 hours and was d/c'd this morning.  Her SBP has since been liberalized and neurologic exam continues to improved.  UA on admission noted to be positive, UCx w/ E.coli, currently on ceftriaxone.  Pt medically stable for transfer to medicine SDU.        Acute CVA   - Likely 2/2 AF off AC   - s/p TNK and Rt M1 thrombectomy 05/29  - Clinically improving although still w/ L-sided weakness and word finding   - concern for dysphagia givne reporting coughing w/ swallowing liquids; will have speech reacess   - c/w neuro checks and VS q2h   - MRI brain pending to evaluate stroke burden but needs PPM interrogated prior; cards aware and awaiting PPM card from pts son  - Resuming AC would help lower risk of CVA in the future however given age and fall risk, risk may out weight benefits   - c/w simvastatin and asa  - Neurology following        AF   - Rate controlled   - Not on AC at this time   - c/w digoxin   - EP/cardio following       Normocytic anemia   - Hemodynamically stable   - s/p TKA   - No active bleeding   - Monitor H/H and transfuse as needed      Uncomplicated UTI  - UA w/ pyuria and Ucx growing E.coli   - c/w rocephin through 05/31  - Leukocytosis likely from UTI and reactive s/p intervention       Hypothyroid   - c/w synthroid       VTE ppx: lovenox sq    Dispo: down grade to SDU

## 2023-05-30 NOTE — PROGRESS NOTE ADULT - ASSESSMENT
INCOMPLETE   ASSESSMENT: 89F with PMH afib recently taken off AC who presented with Right  gaze deviation and Left sided weakness. Last known well 1900 5/28/23, was found by  at 2100 that day with symptoms. [place] [facility] initiated, NIH 26, found to have Right M1 occlusion on CT Angiogram, post tenecteplase additionally noted basilar thrombus per Dr. Perez which resolved post tenecteplase. Subsequently underwent mechanical thrombectomy with TICI 3 recanalization.  Etiology likely cardioembolic in setting of atrial fibrillation .    NEURO: neurologically overall improving post thrombectomy, Continue close monitoring for neurologic deterioration , stroke neuro checks  q1  ,  SBP goal post thrombectomy 110-140mmHg , titrate statin to LDL goal less than 70, MRI Brain w/o, MRA Head w/o and Neck w/contrast. Physical therapy/OT/Speech eval/treatment.     ANTITHROMBOTIC THERAPY: on hold post thrombectomy/tenecteplase protocol, pending 24 hour follow up CT head imaging post protocol anticipate initiation of ASA 81mg daily if not contraindicated.  Further Anticoagulation timeline  recommendations pending candidacy of AC (if benefits>risks, clarification re: indication for stopping) and findings of follow up neuro imaging (MRI).      PULMONARY: protecting airway, saturating well     CARDIOVASCULAR:  TTE as noted, EF 60-65%, mild concentric LVH, moderate to severe LAE, moderately enlarged RA, mild MVR, mild AR, mild-moderate TR, mild pulmonary htn, cardiac monitoring to ensure rate control                             GASTROINTESTINAL:  dysphagia screen deferred to SLP- who suggested puree w/ no PO liquids, team to continue to [facility].     RENAL: BUN/Cr elevated but improving, monitor urine output, maintain adequate hydration       Na Goal:  135-145    HEMATOLOGY: H/H with anemia, 183, patient should have all age and risk appropriate malignancy screenings with PCP or sooner if clinically suspected, close monitoring, transfusion as needed, consider guaiac      DVT ppx: SCD for now, pharmacological dvt ppx pending findings of post protocol neuro imaging after 24 hrs.     ID: afebrile,  leukocytosis, + UTI, cx and abx per ICU.     OTHER:  condition and plan of care d/w patient, questions and concerns addressed.     DISPOSITION: Rehab or home depending on PT eval once stable and workup is complete      CORE MEASURES:        Admission NIHSS:  26     Tenecteplase : [x] YES [] NO      LDL/HDL/A1C: 26/27/5.7     Depression Screen- if depression hx and/or present      Statin Therapy: as noted      Dysphagia Screen: [] PASS [] FAIL pending      Smoking [] YES [x] NO      Afib [x] YES [] NO     Stroke Education [x] YES [] NO    Obtain screening lower extremity venous ultrasound in patients who meet 1 or more of the following criteria as patient is high risk for DVT/PE on admission:   [] History of DVT/PE  []Hypercoagulable states (Factor V Leiden, Cancer, OCP, etc. )  []Prolonged immobility (hemiplegia/hemiparesis/post operative or any other extended immobilization)  [] Transferred from outside facility (Rehab or Long term care)  [] Age </= to 50    ASSESSMENT: 89F with PMH afib recently taken off AC noted s/p fall, PPM, who presented with Right  gaze deviation and Left sided weakness. Last known well 1900 5/28/23, was found by  at 2100 that day with symptoms. Patient taken to [facility], code stroke initiated, NIH 26, found to have Right M1 occlusion on CT Angiogram, post tenecteplase additionally noted basilar thrombus per Dr. Perez which resolved post tenecteplase. Subsequently underwent mechanical thrombectomy with TICI 3 recanalization.  Etiology likely cardioembolic in setting of atrial fibrillation .    NEURO: neurologically overall improving post thrombectomy, Continue close monitoring for neurologic deterioration , stroke neuro checks  q1  ,  SBP goal post thrombectomy 110-140mmHg , titrate statin to LDL goal less than 70, MRI Brain w/o, MRA Head w/o and Neck w/contrast. Physical therapy/OT/Speech eval/treatment.     ANTITHROMBOTIC THERAPY:      PULMONARY: protecting airway, saturating well     CARDIOVASCULAR:  TTE as noted, EF 60-65%, mild concentric LVH, moderate to severe LAE, moderately enlarged RA, mild MVR, mild AR, mild-moderate TR, mild pulmonary htn, cardiac monitoring to ensure rate control                             GASTROINTESTINAL:  dysphagia screen deferred to SLP- who suggested puree w/ no PO liquids, team to continue to [facility].     RENAL: BUN/Cr elevated but improving, monitor urine output, maintain adequate hydration       Na Goal:  135-145    HEMATOLOGY: H/H with anemia, 183, patient should have all age and risk appropriate malignancy screenings with PCP or sooner if clinically suspected, close monitoring, transfusion as needed, consider guaiac      DVT ppx: SCD for now, pharmacological dvt ppx pending findings of post protocol neuro imaging after 24 hrs.     ID: afebrile,  leukocytosis, + UTI, cx and abx per ICU.     OTHER:  condition and plan of care d/w patient, questions and concerns addressed.     DISPOSITION: Rehab or home depending on PT eval once stable and workup is complete      CORE MEASURES:        Admission NIHSS:  26     Tenecteplase : [x] YES [] NO      LDL/HDL/A1C: 26/27/5.7     Depression Screen- if depression hx and/or present      Statin Therapy: as noted      Dysphagia Screen: [] PASS [] FAIL pending      Smoking [] YES [x] NO      Afib [x] YES [] NO     Stroke Education [x] YES [] NO    Obtain screening lower extremity venous ultrasound in patients who meet 1 or more of the following criteria as patient is high risk for DVT/PE on admission:   [] History of DVT/PE  []Hypercoagulable states (Factor V Leiden, Cancer, OCP, etc. )  []Prolonged immobility (hemiplegia/hemiparesis/post operative or any other extended immobilization)  [] Transferred from outside facility (Rehab or Long term care)  [] Age </= to 50    ASSESSMENT: 89F with PMH afib recently taken off AC about 1 week post fall noted s/p fall, PPM, who presented with Right  gaze deviation and Left sided weakness. Last known well 1900 5/28/23, was found by  at 2100 that day with symptoms. Patient taken to [facility] [place] [place], code stroke initiated, NIH 26, found to have Right M1 occlusion on CT Angiogram, post tenecteplase additionally noted top of basilar thrombus appeared to embolize to on angiogram post tenecteplase. Subsequently underwent mechanical thrombectomy with TICI 3 recanalization.  Etiology likely cardioembolic in setting of atrial fibrillation .    NEURO: neurologically overall improving post thrombectomy, Continue close monitoring for neurologic deterioration , stroke neuro checks  q 2 ,  SBP goal post thrombectomy 110-140mmHg , titrate statin to LDL goal less than 70, MRI Brain w/o, MRA Head w/o and Neck w/contrast. Physical therapy/OT/Speech eval/treatment.     ANTITHROMBOTIC THERAPY: Patient has a history of atrial fibrillation now with cardioembolic event. If no absolute contraindication or significant risk of hemorrhage and overall benefit> risks then suggest to resume Apixaban 2.5mg BID. ASA based on cardiology/medical indication.  Patient needs extensive monitoring and support for all ADL to avoid and minimize fall risk.  At the request of the patient was d/w son who is a physician by Dr. Toure; son amenable at this time, discussed associated risk of hemorrhage but at this time from neurological standpoint overall benefit>risk for Anticoagulation.     PULMONARY: protecting airway, saturating well     CARDIOVASCULAR:  TTE as noted, EF 60-65%, mild concentric LVH, moderate to severe LAE, moderately enlarged RA, mild MVR, mild AR, mild-moderate TR, mild pulmonary htn, cardiac monitoring to ensure rate control.  If not candidate for AC suggest to consider watchman.                 GASTROINTESTINAL:  dysphagia screen deferred to SLP- who suggested puree w/ no PO liquids, team to continue to [facility].     RENAL: BUN/Cr elevated but improving, monitor urine output, maintain adequate hydration       Na Goal:  135-145    HEMATOLOGY: H/H with anemia, 183, patient should have all age and risk appropriate malignancy screenings with PCP or sooner if clinically suspected, close monitoring, transfusion as needed, consider guaiac      DVT ppx: SCD for now, pharmacological dvt ppx pending findings of post protocol neuro imaging after 24 hrs.     ID: afebrile,  leukocytosis, + UTI, cx and abx per ICU.     OTHER:  condition and plan of care d/w patient, questions and concerns addressed.     DISPOSITION: Rehab or home depending on PT eval once stable and workup is complete      CORE MEASURES:        Admission NIHSS:  26     Tenecteplase : [x] YES [] NO      LDL/HDL/A1C: 26/27/5.7     Depression Screen- if depression hx and/or present      Statin Therapy: as noted      Dysphagia Screen: [] PASS [] FAIL pending      Smoking [] YES [x] NO      Afib [x] YES [] NO     Stroke Education [x] YES [] NO    Obtain screening lower extremity venous ultrasound in patients who meet 1 or more of the following criteria as patient is high risk for DVT/PE on admission:   [] History of DVT/PE  []Hypercoagulable states (Factor V Leiden, Cancer, OCP, etc. )  []Prolonged immobility (hemiplegia/hemiparesis/post operative or any other extended immobilization)  [] Transferred from outside facility (Rehab or Long term care)  [] Age </= to 50    ASSESSMENT: 89F with PMH afib recently taken off AC about 1 week post fall noted s/p fall, PPM, who presented with Right  gaze deviation and Left sided weakness. Last known well 1900 5/28/23, was found by  at 2100 that day with symptoms. Patient taken to [facility], code stroke initiated, NIH 26, found to have Right M1 and basilar apex occlusion on CT Angiogram, post tenecteplase. Subsequently underwent mechanical thrombectomy with TICI 3 recanalization.  Etiology likely cardioembolic in setting of atrial fibrillation. Basilar thrombus appeared to embolize to on angiogram post tenecteplase to left P1 origin, left PCA filling via left PComm.     NEURO: neurologically overall improving post thrombectomy, Continue close monitoring for neurologic deterioration , stroke neuro checks  q 2 ,  SBP goal post thrombectomy 110-140mmHg , titrate statin to LDL goal less than 70, MRI Brain w/o, MRA Head w/o and Neck w/contrast. Physical therapy/OT/Speech eval/treatment.     ANTITHROMBOTIC THERAPY: Patient has a history of atrial fibrillation now with cardioembolic event. If no absolute contraindication or significant risk of hemorrhage and overall benefit> risks then suggest to resume Apixaban 2.5mg BID. ASA based on cardiology/medical indication.  Patient needs extensive monitoring and support for all ADL to avoid and minimize fall risk.  At the request of the patient was d/w son who is a physician by Dr. Toure; son amenable at this time, discussed associated risk of hemorrhage but at this time from neurological standpoint overall benefit>risk for Anticoagulation. Patient does have significant fall risk (multiple falls).     PULMONARY: protecting airway, saturating well     CARDIOVASCULAR:  TTE as noted, EF 60-65%, mild concentric LVH, moderate to severe LAE, moderately enlarged RA, mild MVR, mild AR, mild-moderate TR, mild pulmonary htn, cardiac monitoring to ensure rate control.  If not candidate for AC suggest to consider watchman.                                            May discuss DARIUS closure with outpatient cardiologist.     GASTROINTESTINAL:  dysphagia screen deferred to SLP- who suggested puree w/ no PO liquids, team to continue to follow.     RENAL: BUN/Cr elevated but improving, monitor urine output, maintain adequate hydration       Na Goal:  135-145    HEMATOLOGY: H/H with anemia, 183, patient should have all age and risk appropriate malignancy screenings with PCP or sooner if clinically suspected, close monitoring, transfusion as needed, consider guaiac      DVT ppx: SCD for now, pharmacological dvt ppx pending findings of post protocol neuro imaging after 24 hrs.     ID: afebrile,  leukocytosis, + UTI, cx and abx per ICU.     OTHER:  condition and plan of care d/w patient, questions and concerns addressed.     DISPOSITION: Rehab or home depending on PT eval once stable and workup is complete      CORE MEASURES:        Admission NIHSS:  26     Tenecteplase : [x] YES [] NO      LDL/HDL/A1C: 26/27/5.7     Depression Screen- if depression hx and/or present      Statin Therapy: as noted      Dysphagia Screen: [] PASS [] FAIL pending      Smoking [] YES [x] NO      Afib [x] YES [] NO     Stroke Education [x] YES [] NO    Obtain screening lower extremity venous ultrasound in patients who meet 1 or more of the following criteria as patient is high risk for DVT/PE on admission:   [] History of DVT/PE  []Hypercoagulable states (Factor V Leiden, Cancer, OCP, etc. )  []Prolonged immobility (hemiplegia/hemiparesis/post operative or any other extended immobilization)  [] Transferred from outside facility (Rehab or Long term care)  [] Age </= to 50

## 2023-05-30 NOTE — OCCUPATIONAL THERAPY INITIAL EVALUATION ADULT - RANGE OF MOTION EXAMINATION, UPPER EXTREMITY
LUE noted to be weaker when compared to right, decreased motor control/bilateral UE Active ROM was WFL  (within functional limits)

## 2023-05-30 NOTE — SPEECH LANGUAGE PATHOLOGY EVALUATION - SLP CRITERIA FOR SKILLED THERAPEUTIC INTERVENTIONS MET
Humidifier in room  Sleep propped  Push fluids  Limit dairy  Mucinex as directed  Use inhaler or neb every 4-6 hours  Follow up with PCP if no improvement or go to ER    Bronchitis, Antibiotic Treatment (Adult)    Bronchitis is an infection of the air pa · Over-the-counter cough, cold, and sore-throat medicines will not shorten the length of the illness, but they may be helpful to reduce symptoms. (Note: Do not use decongestants if you have high blood pressure.)  · Finish all antibiotic medicine.  Do this e skilled criteria for intervention met

## 2023-05-30 NOTE — OCCUPATIONAL THERAPY INITIAL EVALUATION ADULT - DIAGNOSIS, OT EVAL
89 year old Female w/ PMHx afib recently taken off AC about 1 week post fall noted s/p fall, PPM who presented to [facility] with Right gaze deviation, Left sided weakness, aphasia, and Left facial droop, found to have Right M1 occlusion on CTA. TNK given at 23:33 on 5/28. Pt was [place] [facility] [place] to [facility] for further care. Now s/p mechanical thrombectomy on 5/29 with TICI 3 revascularization

## 2023-05-30 NOTE — PHYSICAL THERAPY INITIAL EVALUATION ADULT - IMPAIRMENTS FOUND, PT EVAL
arousal, attention, and cognition/decreased midline orientation/gait, locomotion, and balance/muscle strength

## 2023-05-30 NOTE — SPEECH LANGUAGE PATHOLOGY EVALUATION - SLP PERTINENT HISTORY OF CURRENT PROBLEM
As per MD note: "89F with PMH afib recently taken off AC who presented with Right  gaze deviation and Left sided weakness. Last known well 1900 5/28/23, was found by  at 2100 that day with symptoms. Patient taken to [facility], code stroke initiated, NIH 26, found to have Right M1 occlusion on CTA, additionally noted basilar thrombus per Dr. Perez . Patient was given Tenecteplase at 23:33 5/28/23 and was transferred to [facility] for mechanical thrombectomy. On arrival, patient aphasic, unable to perform ROS. "

## 2023-05-30 NOTE — PHYSICAL THERAPY INITIAL EVALUATION ADULT - DIAGNOSIS, PT EVAL
left sided weakness, poor left sided coordination, decreased proprioception and impaired midline orientation s/p CVA

## 2023-05-30 NOTE — OCCUPATIONAL THERAPY INITIAL EVALUATION ADULT - GENERAL OBSERVATIONS, REHAB EVAL
Received pt in bedside recliner, NAD, +IV, +Tele//BP, +Primafit, +alarm on RA A&Ox4 but with decreased: insight, processing, sequencing and safety awareness. Pre/post pain 0/10. Pt agreeable to OT evaluation

## 2023-05-30 NOTE — PROGRESS NOTE ADULT - SUBJECTIVE AND OBJECTIVE BOX
HPI:  89F with PMH afib recently taken off AC who presented with R gaze deviation and L sided weakness. Last known well 1900, was found by  at 2100 with symptoms. Patient taken to [facility], code stroke initiated, NIH 26, found to have RM1 occlusion on CTA. Patient was given TNK at 23:33 and was transferred to [facility] for mechanical thrombectomy. On arrival, patient aphasic, unable to perform ROS.     NIH 26, mRS 0 on admission    NIH SS:  DATE: 5/29/23   TIME: 0110  1A: Level of consciousness (0-3): 0  1B: Questions (0-2): 2  1C: Commands (0-2): 2  2: Gaze (0-2): 2  3: Visual fields (0-3): 2  4: Facial palsy (0-3): 2  MOTOR:  5A: Left arm motor drift (0-4): 3  5B: Right arm motor drift (0-4): 2  6A: Left leg motor drift (0-4): 3  6B: Right leg motor drift (0-4): 2  7: Limb ataxia (0-2): 0  SENSORY:  8: Sensation (0-2): 0  SPEECH:  9: Language (0-3): 2  10: Dysarthria (0-2): 2  EXTINCTION:  11: Extinction/inattention (0-2): 2    TOTAL SCORE: 26 (29 May 2023 01:09)    O/n events: none reported.    ICU Vital Signs Last 24 Hrs  T(C): 36.9 (30 May 2023 11:47), Max: 37.7 (29 May 2023 18:30)  T(F): 98.5 (30 May 2023 11:47), Max: 99.9 (29 May 2023 18:30)  HR: 80 (30 May 2023 13:30) (60 - 90)  BP: 126/57 (30 May 2023 13:30) (102/39 - 137/56)  BP(mean): 77 (30 May 2023 13:30) (56 - 96)  ABP: --  ABP(mean): --  RR: 19 (30 May 2023 13:30) (14 - 26)  SpO2: 99% (30 May 2023 13:30) (93% - 100%)    O2 Parameters below as of 30 May 2023 12:00  Patient On (Oxygen Delivery Method): room air      Labs, imaging reviewed.    Exam:  awake and alert, oriented to self, place and time, follows simple commands, repeats , mild dysarthria, comprehension seem intact, conversational.  Left facial palsy noted, EOMI, no nystagmu, tongue midline.   Motor: 5/5 in the right arm and  4/5 leg. And 4/5 in the left arm and 4/5 leg. Drift in L arm/leg.  Sensation: Intact to light touch in 4 extremities.   CTAB  S1S2 present  Abd soft, NT, ND  No peripheral edema  L knee with mild swelling, appears chronic, no signs of active inflammation,  ROM full; skin tear over the lateral aspect of the L knee noted.

## 2023-05-30 NOTE — OCCUPATIONAL THERAPY INITIAL EVALUATION ADULT - ADDITIONAL COMMENTS
Pt has a shower with doors and no grab bars. Pt was independent PTA with a SAC. Pt owns SAC and rollator. Pt is right handed and does not drive, states spouse drives and will take her to doctor appointments. Pt reports decreased hearing.

## 2023-05-30 NOTE — CHART NOTE - NSCHARTNOTEFT_GEN_A_CORE
Melina Chau (Mimi) is a 89 year old female with A.fib on Eliquis (stopped 5/23 by cardiologist due to freq falls), HTN, asthma/bronchiectasis, latent TB, hypothroidism, HLD, arthritis, chronic dizziness, macular degeneration. 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 He was emergently taken to angio for thrombectomy where TICI 3 revascularization of the Rt M1 was obtained. Admitted to NSICU following the procedure where her neurologic exam has continued to improved. Her neuro exam was initially noted to worsen at an SBP <110 so she was maintained on светлана x 24 hours. Her SBP has since been liberalized and neurologic exam continues to improved. UA on admission noted to be positive, UCx w/ E.coli, currently on ceftriaxone.     Admission scores:  NIH 26, mRS 0 on admission    Vital Signs Last 24 Hrs  T(C): 36.9 (05-30-23 @ 11:47), Max: 37.7 (05-29-23 @ 18:30)  T(F): 98.5 (05-30-23 @ 11:47), Max: 99.9 (05-29-23 @ 18:30)  HR: 81 (05-30-23 @ 14:00) (60 - 90)  BP: 139/62 (05-30-23 @ 14:00) (102/39 - 139/62)  BP(mean): 86 (05-30-23 @ 14:00) (56 - 96)  RR: 19 (05-30-23 @ 14:00) (14 - 26)  SpO2: 99% (05-30-23 @ 14:00) (93% - 100%)    PHYSICAL EXAM:  Constitutional: No Acute Distress, sitting in chair at bedside eating lunch without complaints  Neurological:   - Mental Status :  Awake, alert and oriented to person, place and time. + dysarthria (improving), aphasia improved. Following commands  - CN: PERRL, Midline gaze. subtle left facial  - Motor: MONTOYA, LUE 4+/5, LLE 4+/5 (pain limited due to knee pain), subtle Lt drift. Rt side 5/5 w/o drift  - Sensation: grossly intact  Incision: Rt groin c/d/i    LABS:             8.9    14.33 )-----------( 183      ( 30 May 2023 04:48 )             27.5    05-30    141  |  108  |  20.8<H>  ----------------------------<  100<H>  4.1   |  23.0  |  0.62    Ca    8.4      30 May 2023 04:48  Phos  3.3     05-30  Mg     2.2     05-30    TPro  5.9<L>  /  Alb  3.0<L>  /  TBili  0.6  /  DBili  x   /  AST  20  /  ALT  21  /  AlkPhos  70  05-29  PT/INR - ( 29 May 2023 07:00 )   PT: 13.3 sec;   INR: 1.14 ratio    PTT - ( 29 May 2023 07:00 )  PTT:28.8 sec    LDL 26, A1c 5.7, TSH 1.32     MEDICATIONS:  Antibiotics:  cefTRIAXone Injectable.      cefTRIAXone Injectable. 1000 milliGRAM(s) IV Push every 24 hours    Neuro:    Cardiac:  digoxin     Tablet 125 MICROGram(s) Oral daily  hydrALAZINE Injectable 10 milliGRAM(s) IV Push every 2 hours PRN SBP >140  labetalol Injectable 10 milliGRAM(s) IV Push every 2 hours PRN SBP >140    Pulm:  albuterol    0.083% 2.5 milliGRAM(s) Nebulizer every 6 hours PRN Shortness of Breath and/or Wheezing  budesonide 160 MICROgram(s)/formoterol 4.5 MICROgram(s) Inhaler 2 Puff(s) Inhalation two times a day    GI/:  famotidine    Tablet 20 milliGRAM(s) Oral daily  polyethylene glycol 3350 17 Gram(s) Oral daily  senna 2 Tablet(s) Oral at bedtime    Other:   aspirin  chewable 81 milliGRAM(s) Oral daily  chlorhexidine 2% Cloths 1 Application(s) Topical daily  enoxaparin Injectable 40 milliGRAM(s) SubCutaneous every 24 hours  iron sucrose Injectable 200 milliGRAM(s) IV Push every 24 hours  levothyroxine 112 MICROGram(s) Oral daily  simvastatin 10 milliGRAM(s) Oral at bedtime    -------------------------------------------------------------------------------------------------------------  PLAN:  Neuro:   - Q2 neuro checks  - Neurology following for stroke, appreciate recs  - ASA 81 started today.   - MR Brain pending to eval stroke burden prior to resuming AC. (needs PPM interrogated prior to MR, pending PPM card from son)    Respiratory:   - Room Air  - Symbicort    CV:  - -140  - Cont home Digoxin 125 mg  - Continue Simvastatin 10   - Holding home anti-HTN   - PRN: Hydral | Labetalol   - Cardio/EP following     Renal:   - IVL  - Voiding   - Replete electrolytes as needed    GI:    - DASH diet  - Bowel Regimen: Miralax | Senna   - LBM: outpt   - Pepcid     Endocrine:   - Synthroid     Heme/Onc:               - DVT ppx: venodynes on,  SQL   - Venofer x3 days then iron for anemia    ID:   - Afebrile  - Ceftriaxone (5/29-5/31) for UTI    MS:  - Lt knee Xray pending       PT/OT: Acute, PMR pending  Dispo: Med SD  Case discussed with Dr. Toth

## 2023-05-30 NOTE — PHYSICAL THERAPY INITIAL EVALUATION ADULT - GENERAL OBSERVATIONS, REHAB EVAL
Pt received seated in bedside chair + telemetry//BP + IV + chair alarm. C/o 0/10 pain, pt agreeable to PT

## 2023-05-30 NOTE — OCCUPATIONAL THERAPY INITIAL EVALUATION ADULT - VISUAL ACUITY
+reading glasses; no complaints in vision offered. Pt with central/peripheral fields intact bilaterally for age

## 2023-05-30 NOTE — SPEECH LANGUAGE PATHOLOGY EVALUATION - SLP SHORT TERM MEMORY
Pt reports difficulty w/ short term memory, noted within structured STM tasks & informally as pt w/ repetitions of questions (i.e: what time is my medicine due)/impaired

## 2023-05-30 NOTE — PHYSICAL THERAPY INITIAL EVALUATION ADULT - GAIT DEVIATIONS NOTED, PT EVAL
step to pattern, difficulty advancing LLE, + left/posterior lean throughout, Impaired midline orientation awareness./decreased liudmila/increased time in double stance/decreased step length/decreased weight-shifting ability

## 2023-05-30 NOTE — PROGRESS NOTE ADULT - SUBJECTIVE AND OBJECTIVE BOX
NeuroICU downgrade   89 y/o F w/ PMH of AF (Eliquis stopped  by cardiologist due to freq falls), HTN, asthma/bronchiectasis, latent TB, hypothroidism, HLD, arthritis, chronic dizziness, macular degeneration. leadless pacemaker for SSS (placed 22), who presented on  to [facility] with aphasia and LUE weakness and found to have a Rt M1 occlusion.  Pt received TNK and transferred to [facility] neuroICU as a code biplane.  She was emergently taken to angio for thrombectomy where TICI 3 revascularization of the Rt M1 was obtained.  Pts neurologic exam has continued to improved.  Pt was noted to have worsening mentation when SBP <110 so she was maintained on светлана x 24 hours and was d/c'd this morning.  Her SBP has since been liberalized and neurologic exam continues to improved.  UA on admission noted to be positive, UCx w/ E.coli, currently on ceftriaxone.  Pt medically stable for transfer to medicine SDU.            Chief Complaint:  aphasia and LUE weakness    SUBJECTIVE / OVERNIGHT EVENTS:  No acute events reported overnight and pt has been off pressors since the morning.          I&O's Summary    29 May 2023 07:01  -  30 May 2023 07:00  --------------------------------------------------------  IN: 2205.4 mL / OUT: 1050 mL / NET: 1155.4 mL    30 May 2023 07:01  -  30 May 2023 18:11  --------------------------------------------------------  IN: 720.1 mL / OUT: 300 mL / NET: 420.1 mL          PHYSICAL EXAM:  Vital Signs Last 24 Hrs  T(C): 36.7 (30 May 2023 15:46), Max: 37.7 (29 May 2023 18:30)  T(F): 98 (30 May 2023 15:46), Max: 99.9 (29 May 2023 18:30)  HR: 87 (30 May 2023 17:00) (60 - 90)  BP: 131/55 (30 May 2023 17:00) (102/39 - 139/62)  BP(mean): 76 (30 May 2023 17:00) (59 - 96)  RR: 23 (30 May 2023 17:00) (14 - 26)  SpO2: 96% (30 May 2023 17:00) (93% - 100%)    Parameters below as of 30 May 2023 17:00  Patient On (Oxygen Delivery Method): room air      GENERAL: pt examined bedside, laying comfortably in bed in NAD  HEENT: NC/AT, moist oral mucosa, clear conjunctiva, sclera nonicteric  RESPIRATORY: Normal respiratory effort, no wheezing, rhonchi, rales  CARDIOVASCULAR: irregularly irregular normal S1 and S2, +murmur  ABDOMEN: soft, NT/ND, +BS, no rebound/guarding  EXTREMITIES: No cynaosis, no clubbing, no LE edema  NEUROLOGY: A+Ox3, word finding, LUE and LLE 4/5 strength, rt side 5/5 strength, sensation intact, CNII-XII intact   SKIN: ecchymosis on extremities         LABS:                        8.9    14.33 )-----------( 183      ( 30 May 2023 04:48 )             27.5     05-30    141  |  108  |  20.8<H>  ----------------------------<  100<H>  4.1   |  23.0  |  0.62    Ca    8.4      30 May 2023 04:48  Phos  3.3     05-30  Mg     2.2     05-30    TPro  5.9<L>  /  Alb  3.0<L>  /  TBili  0.6  /  DBili  x   /  AST  20  /  ALT  21  /  AlkPhos  70  05-29    PT/INR - ( 29 May 2023 07:00 )   PT: 13.3 sec;   INR: 1.14 ratio         PTT - ( 29 May 2023 07:00 )  PTT:28.8 sec  CARDIAC MARKERS ( 29 May 2023 04:00 )  x     / <0.01 ng/mL / 43 U/L / x     / x          Urinalysis Basic - ( 29 May 2023 07:00 )    Color: Yellow / Appearance: Clear / S.005 / pH: x  Gluc: x / Ketone: Trace  / Bili: Negative / Urobili: Negative   Blood: x / Protein: 30 mg/dL / Nitrite: Positive   Leuk Esterase: Small / RBC: 3-5 /HPF / WBC >50 /HPF   Sq Epi: x / Non Sq Epi: x / Bacteria: Few        Culture - Urine (collected 29 May 2023 10:00)  Source: Catheterized Catheterized  Preliminary Report (30 May 2023 14:02):    >100,000 CFU/ml Escherichia coli      CAPILLARY BLOOD GLUCOSE            RADIOLOGY & ADDITIONAL TESTS:    < from: CT Head No Cont (23 @ 00:56) >  IMPRESSION:    1. Right anterior MCA acute infarction is suspected    2. Right PCA distribution remote infarction    3. Ischemic white matter disease and atrophy typical for age    4. En plaque meningioma right anterior cranial fossa floor    5. Intracranial atherosclerosis    < end of copied text >      < from: US Duplex Venous Lower Ext Complete, Bilateral (23 @ 10:11) >  IMPRESSION:  No evidence of deep venous thrombosis in either lower extremity.    < end of copied text >      < from: IR Neuro (23 @ 02:37) >  Impression: Successful mechanical thrombectomy of the occluded right MCA   resulting in TICI 3 reperfusion. Thrombus within the P1 segment of the   left PCA, with good flow into the left PCA via the left posterior   communicating artery.    < end of copied text >      < from: TTE Echo Limited or F/U (23 @ 15:15) >  Summary:   1. Limited follow up agitated saline study.   2. Normal global left ventricular systolic function.   3. Left ventricular ejection fraction, by visual estimation, is 60 to   65%.   4. Color flow doppler and intravenous injection of agitated saline   demonstrates the presence of an intact intra atrial septum.    < end of copied text >        MEDICATIONS  (STANDING):  aspirin  chewable 81 milliGRAM(s) Oral daily  budesonide 160 MICROgram(s)/formoterol 4.5 MICROgram(s) Inhaler 2 Puff(s) Inhalation two times a day  cefTRIAXone Injectable.      cefTRIAXone Injectable. 1000 milliGRAM(s) IV Push every 24 hours  chlorhexidine 2% Cloths 1 Application(s) Topical daily  digoxin     Tablet 125 MICROGram(s) Oral daily  enoxaparin Injectable 40 milliGRAM(s) SubCutaneous every 24 hours  famotidine    Tablet 20 milliGRAM(s) Oral daily  iron sucrose Injectable 200 milliGRAM(s) IV Push every 24 hours  levothyroxine 112 MICROGram(s) Oral daily  polyethylene glycol 3350 17 Gram(s) Oral daily  senna 2 Tablet(s) Oral at bedtime  simvastatin 10 milliGRAM(s) Oral at bedtime    MEDICATIONS  (PRN):  albuterol    0.083% 2.5 milliGRAM(s) Nebulizer every 6 hours PRN Shortness of Breath and/or Wheezing  hydrALAZINE Injectable 10 milliGRAM(s) IV Push every 2 hours PRN SBP >140  labetalol Injectable 10 milliGRAM(s) IV Push every 2 hours PRN SBP >140

## 2023-05-30 NOTE — OCCUPATIONAL THERAPY INITIAL EVALUATION ADULT - PERTINENT HX OF CURRENT PROBLEM, REHAB EVAL
Pt noted with left sided weakness, impaired left sided coordination, decreased proprioception, impaired midline orientation and decreased insight to deficits/overall safety awareness.

## 2023-05-30 NOTE — PHYSICAL THERAPY INITIAL EVALUATION ADULT - TRANSFER SAFETY CONCERNS NOTED: SIT/STAND, REHAB EVAL
difficulty gripping and maintaining Left hand on RW, + left/posterior lean throughout./decreased safety awareness/decreased proprioception/decreased sequencing ability/decreased weight-shifting ability

## 2023-05-30 NOTE — PHYSICAL THERAPY INITIAL EVALUATION ADULT - ADDITIONAL COMMENTS
Pt lives in a private home with her spouse (93 y/o, ? level of assist available) and has a son, daughter in law and adult grand children nearby that can assist. 2-3 steps to enter with ? handrails, 12 steps inside with handrails to bedrooms (pt states she can stay on first floor however, only a couch available to sleep on) Pt was independent PTA with a SAC. Pt owns SAC and rollator.

## 2023-05-30 NOTE — PROGRESS NOTE ADULT - ASSESSMENT
Assessment:  89F with PMH afib recently taken off AC who presented with L sided weakness, aphasia, and L facial droop, found to have R M1 occlusion. TNK given at 23:33. Tx from [facility] to [facility] for mechanical thrombectomy on 5/29, TICI 3 revascularization.  - POD1  - Exam continues to improve  - 24 hour CTH      Plan:  - Discussed with Dr. Powers  - Q2 hour neuro checks  - SBP goal 100-140 to prevent reperfusion hemorrhage  - MRI pending  - AC plan per cardiology / neurology   - TTE performed  - A1C 5.7%, LDL 26, TSH 1.32  - PT/OT/ST  - Further care per primary team  - PA only visit   - Please recall if further neuro IR needs identified

## 2023-05-30 NOTE — SPEECH LANGUAGE PATHOLOGY EVALUATION - SLP GENERAL OBSERVATIONS
Pt recd a&ox3, cooperative mild dysarthria noted, wet vocal quality @ baseline, reduced hearing acuity, tolerating RA, NAD, 0/10 pain scale pre/post.

## 2023-05-30 NOTE — PROGRESS NOTE ADULT - SUBJECTIVE AND OBJECTIVE BOX
Preliminary note, offical recommendations pending attending review/signature   Health system Stroke Team  Progress Note     HPI:  89F with PMH afib recently taken off AC who presented with Right  gaze deviation and Left sided weakness. Last known well 1900 5/28/23, was found by  at 2100 that day with symptoms. [place] [facility] [place], NIH 26, found to have Right M1 occlusion on CTA, additionally noted basilar thrombus per Dr. Perez . Patient was given Tenecteplase at 23:33 5/28/23 and was transferred to [facility] for mechanical thrombectomy. On arrival, patient aphasic, unable to perform ROS.   NIH 26, mRS 0 on admission    SUBJECTIVE: No events overnight.  No new neurologic complaints.  ROS reported negative unless otherwise noted.    albuterol    0.083% 2.5 milliGRAM(s) Nebulizer every 6 hours PRN  budesonide 160 MICROgram(s)/formoterol 4.5 MICROgram(s) Inhaler 2 Puff(s) Inhalation two times a day  cefTRIAXone Injectable.      cefTRIAXone Injectable. 1000 milliGRAM(s) IV Push every 24 hours  chlorhexidine 2% Cloths 1 Application(s) Topical daily  digoxin     Tablet 125 MICROGram(s) Oral daily  famotidine    Tablet 20 milliGRAM(s) Oral daily  hydrALAZINE Injectable 10 milliGRAM(s) IV Push every 2 hours PRN  labetalol Injectable 10 milliGRAM(s) IV Push every 2 hours PRN  levothyroxine 112 MICROGram(s) Oral daily  phenylephrine    Infusion 0.1 MICROgram(s)/kG/Min IV Continuous <Continuous>  polyethylene glycol 3350 17 Gram(s) Oral daily  senna 2 Tablet(s) Oral at bedtime  simvastatin 10 milliGRAM(s) Oral at bedtime  sodium chloride 0.9%. 1000 milliLiter(s) IV Continuous <Continuous>      PHYSICAL EXAM:   Vital Signs Last 24 Hrs  T(C): 37.3 (30 May 2023 05:00), Max: 37.7 (29 May 2023 18:30)  T(F): 99.1 (30 May 2023 05:00), Max: 99.9 (29 May 2023 18:30)  HR: 64 (30 May 2023 05:00) (60 - 89)  BP: 125/44 (30 May 2023 05:00) (100/39 - 137/56)  BP(mean): 68 (30 May 2023 05:00) (56 - 80)  RR: 17 (30 May 2023 05:00) (12 - 25)  SpO2: 98% (30 May 2023 05:00) (93% - 100%)    Parameters below as of 30 May 2023 04:00  Patient On (Oxygen Delivery Method): room air    Exam pending   General: NAD    Detailed Neurologic Exam:    Mental status: The patient is awake and alert, oriented to self, place, states end of may, disoriented to year.  The patient is able to name objects w/ choices, notes function of a pen, follows simple commands, repeats sentences.    Cranial nerves: Dysarthria, left facial palsy, Pupils equal 2mm and react symmetrically to light. VF appear overall full to counting fingers, blinks to threat . Extraocular motion is full with no nystagmus. There is no ptosis. Tongue midline.     Motor: There is normal bulk and tone.  There is no tremor.  Strength is 5/5 in the right arm and  4/5 leg.   Strength is 3/5 in the left arm and 4-/5 leg. Drift in arm to bed in 10 seconds, no drift in leg.    Sensation: Intact to light touch and pin in 4 extremities, left sensory extinction     Cerebellar: There is no dysmetria on finger to nose testing.    Gait : deferred    LABS:                        8.9    14.33 )-----------( 183      ( 30 May 2023 04:48 )             27.5    05-30    141  |  108  |  20.8<H>  ----------------------------<  100<H>  4.1   |  23.0  |  0.62    Ca    8.4      30 May 2023 04:48  Phos  3.3     05-30  Mg     2.2     05-30    TPro  5.9<L>  /  Alb  3.0<L>  /  TBili  0.6  /  DBili  x   /  AST  20  /  ALT  21  /  AlkPhos  70  05-29  PT/INR - ( 29 May 2023 07:00 )   PT: 13.3 sec;   INR: 1.14 ratio         PTT - ( 29 May 2023 07:00 )  PTT:28.8 sec      IMAGING: Reviewed by me.   see [facility] Pacs      TTE Echo Complete w/ Contrast w/ Doppler (05.29.23 @ 12:49)     Summary:   1. Left ventricular ejection fraction, by visual estimation, is 60 to   65%.   2. Normal global left ventricular systolic function.   3. The mitral in-flow pattern reveals no discernable A-wave, therefore   no comment on diastolic function can be made.   4. There is mild concentric left ventricular hypertrophy.   5. Normal right ventricular sizeand function.   6. Moderate to severe left atrial enlargement.   7. Moderately enlarged right atrium.   8. Mild mitral valve regurgitation.   9. Sclerotic aortic valve with normal opening.  10. Mild aortic regurgitation.  11. Mild-moderate tricuspid regurgitation.  12. Estimated pulmonary artery systolic pressure is 40.2 mmHg assuming a   right atrial pressure of 10 mmHg, which is consistent with mild pulmonary   hypertension.  13. There is no evidence of pericardial effusion.            Preliminary note, offical recommendations pending attending review/signature   [facility] Stroke Team  Progress Note     HPI:  89F with PMH afib recently taken off AC who presented with Right  gaze deviation and Left sided weakness. Last known well 1900 5/28/23, was found by  at 2100 that day with symptoms. Patient taken to [facility] [place], code stroke initiated, NIH 26, found to have Right M1 occlusion on CTA, additionally noted basilar thrombus per Dr. Perez . Patient was given Tenecteplase at 23:33 5/28/23 and was transferred to [facility] for mechanical thrombectomy. On arrival, patient aphasic, unable to perform ROS.   NIH 26, mRS 0 on admission    SUBJECTIVE: No events overnight.  No new neurologic complaints.  ROS reported negative unless otherwise noted.    albuterol    0.083% 2.5 milliGRAM(s) Nebulizer every 6 hours PRN  budesonide 160 MICROgram(s)/formoterol 4.5 MICROgram(s) Inhaler 2 Puff(s) Inhalation two times a day  cefTRIAXone Injectable.      cefTRIAXone Injectable. 1000 milliGRAM(s) IV Push every 24 hours  chlorhexidine 2% Cloths 1 Application(s) Topical daily  digoxin     Tablet 125 MICROGram(s) Oral daily  famotidine    Tablet 20 milliGRAM(s) Oral daily  hydrALAZINE Injectable 10 milliGRAM(s) IV Push every 2 hours PRN  labetalol Injectable 10 milliGRAM(s) IV Push every 2 hours PRN  levothyroxine 112 MICROGram(s) Oral daily  phenylephrine    Infusion 0.1 MICROgram(s)/kG/Min IV Continuous <Continuous>  polyethylene glycol 3350 17 Gram(s) Oral daily  senna 2 Tablet(s) Oral at bedtime  simvastatin 10 milliGRAM(s) Oral at bedtime  sodium chloride 0.9%. 1000 milliLiter(s) IV Continuous <Continuous>      PHYSICAL EXAM:   Vital Signs Last 24 Hrs  T(C): 37.3 (30 May 2023 05:00), Max: 37.7 (29 May 2023 18:30)  T(F): 99.1 (30 May 2023 05:00), Max: 99.9 (29 May 2023 18:30)  HR: 64 (30 May 2023 05:00) (60 - 89)  BP: 125/44 (30 May 2023 05:00) (100/39 - 137/56)  BP(mean): 68 (30 May 2023 05:00) (56 - 80)  RR: 17 (30 May 2023 05:00) (12 - 25)  SpO2: 98% (30 May 2023 05:00) (93% - 100%)    Parameters below as of 30 May 2023 04:00  Patient On (Oxygen Delivery Method): room air    Exam pending   General: NAD    Detailed Neurologic Exam:    Mental status: The patient is awake and alert, oriented to self, place, states end of may, disoriented to year.  The patient is able to name objects w/ choices, notes function of a pen, follows simple commands, repeats sentences.    Cranial nerves: Dysarthria, left facial palsy, Pupils equal 2mm and react symmetrically to light. VF appear overall full to counting fingers, blinks to threat . Extraocular motion is full with no nystagmus. There is no ptosis. Tongue midline.     Motor: There is normal bulk and tone.  There is no tremor.  Strength is 5/5 in the right arm and  4/5 leg.   Strength is 3/5 in the left arm and 4-/5 leg. Drift in arm to bed in 10 seconds, no drift in leg.    Sensation: Intact to light touch and pin in 4 extremities, left sensory extinction     Cerebellar: There is no dysmetria on finger to nose testing.    Gait : deferred    LABS:                        8.9    14.33 )-----------( 183      ( 30 May 2023 04:48 )             27.5    05-30    141  |  108  |  20.8<H>  ----------------------------<  100<H>  4.1   |  23.0  |  0.62    Ca    8.4      30 May 2023 04:48  Phos  3.3     05-30  Mg     2.2     05-30    TPro  5.9<L>  /  Alb  3.0<L>  /  TBili  0.6  /  DBili  x   /  AST  20  /  ALT  21  /  AlkPhos  70  05-29  PT/INR - ( 29 May 2023 07:00 )   PT: 13.3 sec;   INR: 1.14 ratio         PTT - ( 29 May 2023 07:00 )  PTT:28.8 sec      IMAGING: Reviewed by me.   see [facility] Pacs        TTE Echo Complete w/ Contrast w/ Doppler (05.29.23 @ 12:49)     Summary:   1. Left ventricular ejection fraction, by visual estimation, is 60 to   65%.   2. Normal global left ventricular systolic function.   3. The mitral in-flow pattern reveals no discernable A-wave, therefore   no comment on diastolic function can be made.   4. There is mild concentric left ventricular hypertrophy.   5. Normal right ventricular sizeand function.   6. Moderate to severe left atrial enlargement.   7. Moderately enlarged right atrium.   8. Mild mitral valve regurgitation.   9. Sclerotic aortic valve with normal opening.  10. Mild aortic regurgitation.  11. Mild-moderate tricuspid regurgitation.  12. Estimated pulmonary artery systolic pressure is 40.2 mmHg assuming a   right atrial pressure of 10 mmHg, which is consistent with mild pulmonary   hypertension.  13. There is no evidence of pericardial effusion.            Preliminary note, official recommendations pending attending review/signature   [facility] Stroke Team  Progress Note     HPI:  89F with PMH afib recently taken off AC who presented with Right  gaze deviation and Left sided weakness. Last known well 1900 5/28/23, was found by  at 2100 that day with symptoms. Patient taken to [facility] [place], code stroke initiated, NIH 26, found to have Right M1 occlusion on CTA, additionally noted basilar thrombus per Dr. Perez . Patient was given Tenecteplase at 23:33 5/28/23 and was transferred to [facility] for mechanical thrombectomy. On arrival, patient aphasic, unable to perform ROS.   NIH 26, mRS 0 on admission    SUBJECTIVE: No events overnight.  No new neurologic complaints.  ROS reported negative unless otherwise noted.    albuterol    0.083% 2.5 milliGRAM(s) Nebulizer every 6 hours PRN  budesonide 160 MICROgram(s)/formoterol 4.5 MICROgram(s) Inhaler 2 Puff(s) Inhalation two times a day  cefTRIAXone Injectable.      cefTRIAXone Injectable. 1000 milliGRAM(s) IV Push every 24 hours  chlorhexidine 2% Cloths 1 Application(s) Topical daily  digoxin     Tablet 125 MICROGram(s) Oral daily  famotidine    Tablet 20 milliGRAM(s) Oral daily  hydrALAZINE Injectable 10 milliGRAM(s) IV Push every 2 hours PRN  labetalol Injectable 10 milliGRAM(s) IV Push every 2 hours PRN  levothyroxine 112 MICROGram(s) Oral daily  phenylephrine    Infusion 0.1 MICROgram(s)/kG/Min IV Continuous <Continuous>  polyethylene glycol 3350 17 Gram(s) Oral daily  senna 2 Tablet(s) Oral at bedtime  simvastatin 10 milliGRAM(s) Oral at bedtime  sodium chloride 0.9%. 1000 milliLiter(s) IV Continuous <Continuous>      PHYSICAL EXAM:   Vital Signs Last 24 Hrs  T(C): 37.3 (30 May 2023 05:00), Max: 37.7 (29 May 2023 18:30)  T(F): 99.1 (30 May 2023 05:00), Max: 99.9 (29 May 2023 18:30)  HR: 64 (30 May 2023 05:00) (60 - 89)  BP: 125/44 (30 May 2023 05:00) (100/39 - 137/56)  BP(mean): 68 (30 May 2023 05:00) (56 - 80)  RR: 17 (30 May 2023 05:00) (12 - 25)  SpO2: 98% (30 May 2023 05:00) (93% - 100%)    Parameters below as of 30 May 2023 04:00  Patient On (Oxygen Delivery Method): room air       General: NAD, in bedside chair     Detailed Neurologic Exam:    Mental status: The patient is awake and alert, oriented to self, place, may,  year.  The patient is able to name objects  , follows simple commands, repeats , speech overall fluent.    Cranial nerves: Dysarthria, left facial palsy,  VF appear overall full to movement Extraocular motion is full with no nystagmus. There is no ptosis. Tongue midline.     Motor: There is normal bulk and tone.  There is no tremor.  Strength is 5/5 in the right arm and  4/5 leg.   Strength is 4/5 in the left arm and 4-/5 leg. Drift in arm/leg.    Sensation: Intact to light touch and pin in 4 extremities, left sensory extinction     Cerebellar: There is no dysmetria on finger to nose testing.    Gait : deferred    LABS:                        8.9    14.33 )-----------( 183      ( 30 May 2023 04:48 )             27.5    05-30    141  |  108  |  20.8<H>  ----------------------------<  100<H>  4.1   |  23.0  |  0.62    Ca    8.4      30 May 2023 04:48  Phos  3.3     05-30  Mg     2.2     05-30    TPro  5.9<L>  /  Alb  3.0<L>  /  TBili  0.6  /  DBili  x   /  AST  20  /  ALT  21  /  AlkPhos  70  05-29  PT/INR - ( 29 May 2023 07:00 )   PT: 13.3 sec;   INR: 1.14 ratio         PTT - ( 29 May 2023 07:00 )  PTT:28.8 sec      IMAGING: Reviewed by me.   see [facility] Pacs     CT Head No Cont (05.30.23 @ 00:56)   IMPRESSION:  1. Right anterior MCA acute infarction is suspected  2. Right PCA distribution remote infarction  3. Ischemic white matter disease and atrophy typical for age  4. En plaque meningioma right anterior cranial fossa floor  5. Intracranial atherosclerosis       TTE Echo Complete w/ Contrast w/ Doppler (05.29.23 @ 12:49)     Summary:   1. Left ventricular ejection fraction, by visual estimation, is 60 to   65%.   2. Normal global left ventricular systolic function.   3. The mitral in-flow pattern reveals no discernable A-wave, therefore   no comment on diastolic function can be made.   4. There is mild concentric left ventricular hypertrophy.   5. Normal right ventricular sizeand function.   6. Moderate to severe left atrial enlargement.   7. Moderately enlarged right atrium.   8. Mild mitral valve regurgitation.   9. Sclerotic aortic valve with normal opening.  10. Mild aortic regurgitation.  11. Mild-moderate tricuspid regurgitation.  12. Estimated pulmonary artery systolic pressure is 40.2 mmHg assuming a   right atrial pressure of 10 mmHg, which is consistent with mild pulmonary   hypertension.  13. There is no evidence of pericardial effusion.            [facility] Stroke Team  Progress Note     HPI:  89F with PMH afib recently taken off AC who presented with Right  gaze deviation and Left sided weakness. Last known well 1900 5/28/23, was found by  at 2100 that day with symptoms. Patient taken to [facility] [place], code stroke initiated, NIH 26, found to have Right M1 occlusion on CTA, additionally noted basilar thrombus per Dr. Perez . Patient was given Tenecteplase at 23:33 5/28/23 and was transferred to [facility] for mechanical thrombectomy. On arrival, patient aphasic, unable to perform ROS.   NIH 26, mRS 0 on admission    SUBJECTIVE: No events overnight.  No new neurologic complaints.  ROS reported negative unless otherwise noted.    albuterol    0.083% 2.5 milliGRAM(s) Nebulizer every 6 hours PRN  budesonide 160 MICROgram(s)/formoterol 4.5 MICROgram(s) Inhaler 2 Puff(s) Inhalation two times a day  cefTRIAXone Injectable.      cefTRIAXone Injectable. 1000 milliGRAM(s) IV Push every 24 hours  chlorhexidine 2% Cloths 1 Application(s) Topical daily  digoxin     Tablet 125 MICROGram(s) Oral daily  famotidine    Tablet 20 milliGRAM(s) Oral daily  hydrALAZINE Injectable 10 milliGRAM(s) IV Push every 2 hours PRN  labetalol Injectable 10 milliGRAM(s) IV Push every 2 hours PRN  levothyroxine 112 MICROGram(s) Oral daily  phenylephrine    Infusion 0.1 MICROgram(s)/kG/Min IV Continuous <Continuous>  polyethylene glycol 3350 17 Gram(s) Oral daily  senna 2 Tablet(s) Oral at bedtime  simvastatin 10 milliGRAM(s) Oral at bedtime  sodium chloride 0.9%. 1000 milliLiter(s) IV Continuous <Continuous>      PHYSICAL EXAM:   Vital Signs Last 24 Hrs  T(C): 37.3 (30 May 2023 05:00), Max: 37.7 (29 May 2023 18:30)  T(F): 99.1 (30 May 2023 05:00), Max: 99.9 (29 May 2023 18:30)  HR: 64 (30 May 2023 05:00) (60 - 89)  BP: 125/44 (30 May 2023 05:00) (100/39 - 137/56)  BP(mean): 68 (30 May 2023 05:00) (56 - 80)  RR: 17 (30 May 2023 05:00) (12 - 25)  SpO2: 98% (30 May 2023 05:00) (93% - 100%)    Parameters below as of 30 May 2023 04:00  Patient On (Oxygen Delivery Method): room air       General: NAD, in bedside chair     Detailed Neurologic Exam:    Mental status: The patient is awake and alert, oriented to self, place, may,  year.  The patient is able to name objects  , follows simple commands, repeats , speech overall fluent.    Cranial nerves: Dysarthria, left facial palsy,  VF appear overall full to movement Extraocular motion is full with no nystagmus. There is no ptosis. Tongue midline.     Motor: There is normal bulk and tone.  There is no tremor.  Strength is 5/5 in the right arm and  4/5 leg.   Strength is 4/5 in the left arm and 4-/5 leg. Drift in arm/leg.    Sensation: Intact to light touch and pin in 4 extremities, left sensory extinction     Cerebellar: There is no dysmetria on finger to nose testing.    Gait : deferred    LABS:                        8.9    14.33 )-----------( 183      ( 30 May 2023 04:48 )             27.5    05-30    141  |  108  |  20.8<H>  ----------------------------<  100<H>  4.1   |  23.0  |  0.62    Ca    8.4      30 May 2023 04:48  Phos  3.3     05-30  Mg     2.2     05-30    TPro  5.9<L>  /  Alb  3.0<L>  /  TBili  0.6  /  DBili  x   /  AST  20  /  ALT  21  /  AlkPhos  70  05-29  PT/INR - ( 29 May 2023 07:00 )   PT: 13.3 sec;   INR: 1.14 ratio         PTT - ( 29 May 2023 07:00 )  PTT:28.8 sec      IMAGING: Reviewed by me.   see [facility] Pacs     CT Head No Cont (05.30.23 @ 00:56)   IMPRESSION:  1. Right anterior MCA acute infarction is suspected  2. Right PCA distribution remote infarction  3. Ischemic white matter disease and atrophy typical for age  4. En plaque meningioma right anterior cranial fossa floor  5. Intracranial atherosclerosis       TTE Echo Complete w/ Contrast w/ Doppler (05.29.23 @ 12:49)     Summary:   1. Left ventricular ejection fraction, by visual estimation, is 60 to   65%.   2. Normal global left ventricular systolic function.   3. The mitral in-flow pattern reveals no discernable A-wave, therefore   no comment on diastolic function can be made.   4. There is mild concentric left ventricular hypertrophy.   5. Normal right ventricular sizeand function.   6. Moderate to severe left atrial enlargement.   7. Moderately enlarged right atrium.   8. Mild mitral valve regurgitation.   9. Sclerotic aortic valve with normal opening.  10. Mild aortic regurgitation.  11. Mild-moderate tricuspid regurgitation.  12. Estimated pulmonary artery systolic pressure is 40.2 mmHg assuming a   right atrial pressure of 10 mmHg, which is consistent with mild pulmonary   hypertension.  13. There is no evidence of pericardial effusion.

## 2023-05-30 NOTE — CONSULT NOTE ADULT - ATTENDING COMMENTS
Patient seen and examined. Case discussed with Dr. Mills. Agree with recommendations.     Rehab/Impaired mobility and function - Patient continues to require hospitalization for the above diagnoses and ongoing active management of comorbid complications (acute CVA, left hemiparesis at risk for CVA) that are substantially impairing functional ability and impairing quality of life.       When medically optimized, based on the patient's diagnosis, functional status and potential for progress, recommend ACUTE inpatient rehabilitation for the functional deficits consisting of 3 hours of therapy/day & 24 hour RN/daily PMR physician for comorbid medical management. Patient will be able to tolerate 3 hours a day.    Goals for acute inpatient rehab are to achieve modified independence with bed mobility, modified independence with transfers and modified independence with ambulation of 50' over an LOS of 14 days.    Will continue to follow. Rehab recommendations are dependent on how functional progress changes as well as how patient continues to participate and tolerate therapeutic interventions, which may change. Recommend ongoing mobilization by staff to maintain cardiopulmonary function and prevention of secondary complications related to debility. Discussed the specific management and recommendations above with rehab clinical care team/rehab liaison.      Total Time Spent on Encounter (reviewing clinical notes, labs, radiology, medications, patient history/exam, assessment and plan) - 75 minutes

## 2023-05-30 NOTE — PROGRESS NOTE ADULT - ASSESSMENT
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 S/p Tenecteplase IV and TICI MT 5/29.  Was BP dependent.  PMH Afib, off AC recently due to a fall, SSS  s/p MDT PPM, hypothyroidism.  UTI, on Ceftriaxone.    Plan:  neurochecks q2hrs, protected sleep time  wean David ggt; maintain -180 and DBP <105 for another 24 hr  maintain Osats >92%, cont PRN q6hrs albuterol nebs, cont Adavir  stroke core measures, MRI w/o contrast pending PPM clearance  start ASA, cont statin; will eventually need AC or LAC, Cardiology involved; would hold at least for 3-5 days in view of hypodensity seen on CTh  d/c IV fluids; monitor Electrolytes and UOP  diet as tolerated; MB regimen  cont Ceftriaxone for UTI  SCDs, start SQL for ppx   if stable hemodynamically and neurologically stable in pm today - downgrade to SDU, Medicne involvement would be appreciated

## 2023-05-30 NOTE — PHYSICAL THERAPY INITIAL EVALUATION ADULT - CRITERIA FOR SKILLED THERAPEUTIC INTERVENTIONS
Acute rehab/impairments found/rehab potential/anticipated equipment needs at discharge/anticipated discharge recommendation

## 2023-05-30 NOTE — CONSULT NOTE ADULT - SUBJECTIVE AND OBJECTIVE BOX
89yF was admitted on     Patient is a 89y old  Female who presents with a chief complaint of stroke (30 May 2023 06:22)    HPI:  89F with PMH afib recently taken off AC who presented with R gaze deviation and L sided weakness. Last known well 1900, was found by  at 2100 with symptoms. Patient taken to [facility], code stroke initiated, NIH 26, found to have RM1 occlusion on CTA. Patient was given TNK at 23:33 and was transferred to [facility] for mechanical thrombectomy. On arrival, patient aphasic, unable to perform ROS.   Now s/p Thrombectomy .     NIH 26, mRS 0 on admission    NIH SS:  DATE: 23   TIME: 0110  1A: Level of consciousness (0-3): 0  1B: Questions (0-2): 2  1C: Commands (0-2): 2  2: Gaze (0-2): 2  3: Visual fields (0-3): 2  4: Facial palsy (0-3): 2  MOTOR:  5A: Left arm motor drift (0-4): 3  5B: Right arm motor drift (0-4): 2  6A: Left leg motor drift (0-4): 3  6B: Right leg motor drift (0-4): 2  7: Limb ataxia (0-2): 0  SENSORY:  8: Sensation (0-2): 0  SPEECH:  9: Language (0-3): 2  10: Dysarthria (0-2): 2  EXTINCTION:  11: Extinction/inattention (0-2): 2    TOTAL SCORE: 26 (29 May 2023 01:09)      Imaging showed (reviewed):    US Duplex Venous Lower Ext Complete, Bilateral (23 @ 10:11) >  IMPRESSION: No evidence of deep venous thrombosis in either lower extremity.      CT Head No Cont (23 @ 00:56) >  IMPRESSION:    1. Right anterior MCA acute infarction is suspected    2. Right PCA distribution remote infarction    3. Ischemic white matter disease and atrophy typical for age    4. En plaque meningioma right anterior cranial fossa floor    5. Intracranial atherosclerosis        REVIEW OF SYSTEMS  Constitutional - No fever, No weight loss  HEENT - No eye pain, No visual disturbances  Respiratory - No cough, No wheezing, No shortness of breath  Cardiovascular - No chest pain  Gastrointestinal - No abdominal pain, No nausea, No vomiting, No diarrhea, No constipation  Genitourinary - No dysuria, No frequency, No hematuria  Neurological - No headaches, No numbness  Skin - No itching, No rashes, No lesions       VITALS  T(C): 36.9 (23 @ 11:47), Max: 37.7 (23 @ 18:30)  HR: 79 (23 @ 11:30) (60 - 86)  BP: 128/53 (23 @ 11:30) (102/39 - 137/56)  RR: 19 (23 @ 11:30) (14 - 25)  SpO2: 99% (23 @ 11:30) (93% - 100%)  Wt(kg): --    PAST MEDICAL & SURGICAL HISTORY  Afib      FUNCTIONAL HISTORY  Lives in [place] with 89yo  in a house with 2-3 EVAN and a split full flight to bedrooms + rail.  Straight cane at baseline. Owns rollator.   Independent in ADLs previously.     CURRENT FUNCTIONAL STATUS  Min-A transfers and bed mobility.  Ambulates mod-A 1 person +1 person to manage equipment RW 3 feet. Left posterior lean.       RECENT LABS/IMAGING  CBC Full  -  ( 30 May 2023 04:48 )  WBC Count : 14.33 K/uL  RBC Count : 3.37 M/uL  Hemoglobin : 8.9 g/dL  Hematocrit : 27.5 %  Platelet Count - Automated : 183 K/uL  Mean Cell Volume : 81.6 fl  Mean Cell Hemoglobin : 26.4 pg  Mean Cell Hemoglobin Concentration : 32.4 gm/dL  Auto Neutrophil # : x  Auto Lymphocyte # : x  Auto Monocyte # : x  Auto Eosinophil # : x  Auto Basophil # : x  Auto Neutrophil % : x  Auto Lymphocyte % : x  Auto Monocyte % : x  Auto Eosinophil % : x  Auto Basophil % : x        141  |  108  |  20.8<H>  ----------------------------<  100<H>  4.1   |  23.0  |  0.62    Ca    8.4      30 May 2023 04:48  Phos  3.3     05-30  Mg     2.2     05-30    TPro  5.9<L>  /  Alb  3.0<L>  /  TBili  0.6  /  DBili  x   /  AST  20  /  ALT  21  /  AlkPhos  70      Urinalysis Basic - ( 29 May 2023 07:00 )    Color: Yellow / Appearance: Clear / S.005 / pH: x  Gluc: x / Ketone: Trace  / Bili: Negative / Urobili: Negative   Blood: x / Protein: 30 mg/dL / Nitrite: Positive   Leuk Esterase: Small / RBC: 3-5 /HPF / WBC >50 /HPF   Sq Epi: x / Non Sq Epi: x / Bacteria: Few        ALLERGIES  No Known Allergies      MEDICATIONS   albuterol    0.083% 2.5 milliGRAM(s) Nebulizer every 6 hours PRN  aspirin  chewable 81 milliGRAM(s) Oral daily  budesonide 160 MICROgram(s)/formoterol 4.5 MICROgram(s) Inhaler 2 Puff(s) Inhalation two times a day  cefTRIAXone Injectable.      cefTRIAXone Injectable. 1000 milliGRAM(s) IV Push every 24 hours  chlorhexidine 2% Cloths 1 Application(s) Topical daily  digoxin     Tablet 125 MICROGram(s) Oral daily  enoxaparin Injectable 40 milliGRAM(s) SubCutaneous every 24 hours  famotidine    Tablet 20 milliGRAM(s) Oral daily  hydrALAZINE Injectable 10 milliGRAM(s) IV Push every 2 hours PRN  iron sucrose Injectable 200 milliGRAM(s) IV Push every 24 hours  labetalol Injectable 10 milliGRAM(s) IV Push every 2 hours PRN  levothyroxine 112 MICROGram(s) Oral daily  phenylephrine    Infusion 0.1 MICROgram(s)/kG/Min IV Continuous <Continuous>  polyethylene glycol 3350 17 Gram(s) Oral daily  senna 2 Tablet(s) Oral at bedtime  simvastatin 10 milliGRAM(s) Oral at bedtime      Patient seen and examined in chair at bedside. She is eager to start her exercises.   -------------------------------------------------------------------------------------------------  PHYSICAL EXAM  Constitutional - NAD, Comfortable  HEENT - NCAT, EOMI  Neck - Supple, No limited ROM  Chest - Breathing comfortably, No wheezing  Cardiovascular - warm and well perfused  Abdomen - Soft   Extremities - No C/C/E, No calf tenderness   Neurologic Exam -                    Cognitive - Awake, Alert, AAO to self, place, date, year, situation     Communication - Fluent, No dysarthria     Cranial Nerves - left facial droop. hearing deficits left>right at baseline.      Motor - No focal deficits                    LEFT    UE - ShAB 3/5, EF 4/5, EE 4/5, WE 3/5,  3/5                    RIGHT UE - ShAB 4/5, EF 4/5, EE 4/5, WE 4/5,  3/5                    LEFT    LE - HF 3/5, KE 4/5, DF 4/5PF 45                    RIGHT LE - HF 4/5, KE 5/5, DF 5/5, PF 5/5        Sensory - Intact to LT     Coordination - left dysmetria     Vestibular - No saccades, No nystagmus, VOR      Psychiatric - Mood stable, Affect WNL  ----------------------------------------------------------------------------------------  ASSESSMENT/PLAN  89yFemale with functional deficits after Right M1 occlusion s/p TNK  and thrombectomy .   Pain - Tylenol  DVT PPX - SCDs  Rehab/Mobilization impaired - Will continue to follow for ongoing rehab needs and recommendations. Patient requires ongoing medical intervention requiring stay in ICU. Based on current assessment recommend ACUTE inpatient rehabilitation for the functional deficits consisting of 3 hours of therapy/day & 24 hour RN/daily PMR physician for comorbid medical management. Patient will be able to tolerate 3 hours a day. Rehab recommendations are dependent on how functional progress changes as well as how patient continues to participate and tolerate therapeutic interventions, which may change. Recommend ongoing mobilization by staff to maintain cardiopulmonary function and prevention of secondary complications related to debility. Discussed the specific management and recommendations above with rehab clinical care team/rehab liaison.  Patient lives in [place] and vacations in [place], consider location of rehab such as [place].

## 2023-05-31 DIAGNOSIS — Z95.0 PRESENCE OF CARDIAC PACEMAKER: ICD-10-CM

## 2023-05-31 LAB
-  AMIKACIN: SIGNIFICANT CHANGE UP
-  AMOXICILLIN/CLAVULANIC ACID: SIGNIFICANT CHANGE UP
-  AMPICILLIN/SULBACTAM: SIGNIFICANT CHANGE UP
-  AMPICILLIN: SIGNIFICANT CHANGE UP
-  AZTREONAM: SIGNIFICANT CHANGE UP
-  CEFAZOLIN: SIGNIFICANT CHANGE UP
-  CEFEPIME: SIGNIFICANT CHANGE UP
-  CEFOXITIN: SIGNIFICANT CHANGE UP
-  CEFTRIAXONE: SIGNIFICANT CHANGE UP
-  CEFUROXIME: SIGNIFICANT CHANGE UP
-  CIPROFLOXACIN: SIGNIFICANT CHANGE UP
-  ERTAPENEM: SIGNIFICANT CHANGE UP
-  GENTAMICIN: SIGNIFICANT CHANGE UP
-  IMIPENEM: SIGNIFICANT CHANGE UP
-  LEVOFLOXACIN: SIGNIFICANT CHANGE UP
-  MEROPENEM: SIGNIFICANT CHANGE UP
-  NITROFURANTOIN: SIGNIFICANT CHANGE UP
-  PIPERACILLIN/TAZOBACTAM: SIGNIFICANT CHANGE UP
-  TOBRAMYCIN: SIGNIFICANT CHANGE UP
-  TRIMETHOPRIM/SULFAMETHOXAZOLE: SIGNIFICANT CHANGE UP
ALBUMIN SERPL ELPH-MCNC: 3 G/DL — LOW (ref 3.3–5.2)
ALP SERPL-CCNC: 66 U/L — SIGNIFICANT CHANGE UP (ref 40–120)
ALT FLD-CCNC: 17 U/L — SIGNIFICANT CHANGE UP
ANION GAP SERPL CALC-SCNC: 9 MMOL/L — SIGNIFICANT CHANGE UP (ref 5–17)
AST SERPL-CCNC: 18 U/L — SIGNIFICANT CHANGE UP
BASOPHILS # BLD AUTO: 0.08 K/UL — SIGNIFICANT CHANGE UP (ref 0–0.2)
BASOPHILS NFR BLD AUTO: 0.9 % — SIGNIFICANT CHANGE UP (ref 0–2)
BILIRUB SERPL-MCNC: 0.4 MG/DL — SIGNIFICANT CHANGE UP (ref 0.4–2)
BUN SERPL-MCNC: 18.5 MG/DL — SIGNIFICANT CHANGE UP (ref 8–20)
CALCIUM SERPL-MCNC: 8.5 MG/DL — SIGNIFICANT CHANGE UP (ref 8.4–10.5)
CHLORIDE SERPL-SCNC: 110 MMOL/L — HIGH (ref 96–108)
CO2 SERPL-SCNC: 24 MMOL/L — SIGNIFICANT CHANGE UP (ref 22–29)
CREAT SERPL-MCNC: 0.53 MG/DL — SIGNIFICANT CHANGE UP (ref 0.5–1.3)
CULTURE RESULTS: SIGNIFICANT CHANGE UP
EGFR: 87 ML/MIN/1.73M2 — SIGNIFICANT CHANGE UP
EOSINOPHIL # BLD AUTO: 0.32 K/UL — SIGNIFICANT CHANGE UP (ref 0–0.5)
EOSINOPHIL NFR BLD AUTO: 3.5 % — SIGNIFICANT CHANGE UP (ref 0–6)
GIANT PLATELETS BLD QL SMEAR: PRESENT — SIGNIFICANT CHANGE UP
GLUCOSE SERPL-MCNC: 99 MG/DL — SIGNIFICANT CHANGE UP (ref 70–99)
HCT VFR BLD CALC: 24.2 % — LOW (ref 34.5–45)
HGB BLD-MCNC: 7.8 G/DL — LOW (ref 11.5–15.5)
LYMPHOCYTES # BLD AUTO: 1.3 K/UL — SIGNIFICANT CHANGE UP (ref 1–3.3)
LYMPHOCYTES # BLD AUTO: 14.2 % — SIGNIFICANT CHANGE UP (ref 13–44)
MAGNESIUM SERPL-MCNC: 2 MG/DL — SIGNIFICANT CHANGE UP (ref 1.6–2.6)
MANUAL SMEAR VERIFICATION: SIGNIFICANT CHANGE UP
MCHC RBC-ENTMCNC: 26.4 PG — LOW (ref 27–34)
MCHC RBC-ENTMCNC: 32.2 GM/DL — SIGNIFICANT CHANGE UP (ref 32–36)
MCV RBC AUTO: 82 FL — SIGNIFICANT CHANGE UP (ref 80–100)
METHOD TYPE: SIGNIFICANT CHANGE UP
MONOCYTES # BLD AUTO: 0.49 K/UL — SIGNIFICANT CHANGE UP (ref 0–0.9)
MONOCYTES NFR BLD AUTO: 5.3 % — SIGNIFICANT CHANGE UP (ref 2–14)
MYELOCYTES NFR BLD: 0.9 % — HIGH (ref 0–0)
NEUTROPHILS # BLD AUTO: 6.91 K/UL — SIGNIFICANT CHANGE UP (ref 1.8–7.4)
NEUTROPHILS NFR BLD AUTO: 72.6 % — SIGNIFICANT CHANGE UP (ref 43–77)
NEUTS BAND # BLD: 2.6 % — SIGNIFICANT CHANGE UP (ref 0–8)
ORGANISM # SPEC MICROSCOPIC CNT: SIGNIFICANT CHANGE UP
ORGANISM # SPEC MICROSCOPIC CNT: SIGNIFICANT CHANGE UP
PHOSPHATE SERPL-MCNC: 2.6 MG/DL — SIGNIFICANT CHANGE UP (ref 2.4–4.7)
PLAT MORPH BLD: NORMAL — SIGNIFICANT CHANGE UP
PLATELET # BLD AUTO: 128 K/UL — LOW (ref 150–400)
POLYCHROMASIA BLD QL SMEAR: SLIGHT — SIGNIFICANT CHANGE UP
POTASSIUM SERPL-MCNC: 4 MMOL/L — SIGNIFICANT CHANGE UP (ref 3.5–5.3)
POTASSIUM SERPL-SCNC: 4 MMOL/L — SIGNIFICANT CHANGE UP (ref 3.5–5.3)
PROT SERPL-MCNC: 5.9 G/DL — LOW (ref 6.6–8.7)
RBC # BLD: 2.95 M/UL — LOW (ref 3.8–5.2)
RBC # FLD: 15.4 % — HIGH (ref 10.3–14.5)
RBC BLD AUTO: ABNORMAL
SMUDGE CELLS # BLD: PRESENT — SIGNIFICANT CHANGE UP
SODIUM SERPL-SCNC: 143 MMOL/L — SIGNIFICANT CHANGE UP (ref 135–145)
SPECIMEN SOURCE: SIGNIFICANT CHANGE UP
WBC # BLD: 9.19 K/UL — SIGNIFICANT CHANGE UP (ref 3.8–10.5)
WBC # FLD AUTO: 9.19 K/UL — SIGNIFICANT CHANGE UP (ref 3.8–10.5)

## 2023-05-31 PROCEDURE — 71045 X-RAY EXAM CHEST 1 VIEW: CPT | Mod: 26

## 2023-05-31 PROCEDURE — 99233 SBSQ HOSP IP/OBS HIGH 50: CPT

## 2023-05-31 PROCEDURE — 93279 PRGRMG DEV EVAL PM/LDLS PM: CPT | Mod: 26

## 2023-05-31 PROCEDURE — 70450 CT HEAD/BRAIN W/O DYE: CPT | Mod: 26

## 2023-05-31 PROCEDURE — 99232 SBSQ HOSP IP/OBS MODERATE 35: CPT

## 2023-05-31 RX ADMIN — BUDESONIDE AND FORMOTEROL FUMARATE DIHYDRATE 2 PUFF(S): 160; 4.5 AEROSOL RESPIRATORY (INHALATION) at 08:56

## 2023-05-31 RX ADMIN — CHLORHEXIDINE GLUCONATE 1 APPLICATION(S): 213 SOLUTION TOPICAL at 06:53

## 2023-05-31 RX ADMIN — ENOXAPARIN SODIUM 40 MILLIGRAM(S): 100 INJECTION SUBCUTANEOUS at 11:09

## 2023-05-31 RX ADMIN — Medication 125 MICROGRAM(S): at 05:35

## 2023-05-31 RX ADMIN — FAMOTIDINE 20 MILLIGRAM(S): 10 INJECTION INTRAVENOUS at 11:10

## 2023-05-31 RX ADMIN — SIMVASTATIN 10 MILLIGRAM(S): 20 TABLET, FILM COATED ORAL at 21:23

## 2023-05-31 RX ADMIN — Medication 81 MILLIGRAM(S): at 11:10

## 2023-05-31 RX ADMIN — Medication 112 MICROGRAM(S): at 05:35

## 2023-05-31 RX ADMIN — IRON SUCROSE 200 MILLIGRAM(S): 20 INJECTION, SOLUTION INTRAVENOUS at 11:09

## 2023-05-31 RX ADMIN — CEFTRIAXONE 1000 MILLIGRAM(S): 500 INJECTION, POWDER, FOR SOLUTION INTRAMUSCULAR; INTRAVENOUS at 14:41

## 2023-05-31 NOTE — PROGRESS NOTE ADULT - SUBJECTIVE AND OBJECTIVE BOX
Patient is motivated.  Provided additional exercises to perform.     FUNCTIONAL PROGRESS  5/30 SLP  Speech Language Pathology Recommendations: 1. regular diet w/ thin liquids2. 1:1 assist for PO3. strict aspiration precautions 4. Upright for PO, slow rate, small bites/sips, alternate solids/liquids, oral care5. d/c PO diet w/ overt s/s 6. speech therapy to improve mild dysarthria and ongoing assessment of cognitive linguistic skills     Clinical Impression and Recommendations:   · Diagnosis	Pt's receptive language skills assessed to be WFL marked by ability to follow simple and complex commands, answer complex yes/no questions, and ID objects/pictures F:4. Pt presents w/ mild expressive language deficits characterized by slight word finding difficulties in structured fluency tasks however, question baseline versus true expressive language deficits from CVA. Pt able to express all wants/needs, participate in conversation, & complete automatic speech tasks, sentence formation and confrontation naming tasks w/ 100% accuracy independently.  · Criteria for Skilled Therapeutic Interventions Met	skilled criteria for intervention met  · Therapy Frequency	as schedule permits    Behavioral Exam:   · Behavioral Observations	within functional limits  · Visual Perception and Processing	intact    Auditory Comprehension:   · Able to Identify Objects	within functional limits  · field of 4 or more	yes  100%  · field of 4 or more	yes  100%  · Answers Yes/No Questions	within functional limits  · complex	yes  100%  · 3-step	yes  100%  · Discourse	intact  · Right/Left Discrimination	intact  · Body Part ID	intact  · Open Ended Questions	intact    Verbal Expression:   · Automatic Speech	intact; days of the week; months of the year  · Confrontational Naming	intact  100%  · Repetition	intact  · Fluency	impaired, reduced organization of task, +word finding deficits, 3WPM.  · Conversational Speech	WFL    Cognitive Linguistic Status Examination:   · Attention	intact  · Short Term Memory	impaired  Pt reports difficulty w/ short term memory, noted within structured STM tasks & informally as pt w/ repetitions of questions (i.e: what time is my medicine due)  · Long Term Memory	intact  for biographical information  · Problem Solving	intact  100%  · Reasoning	intact  100%  · Organization	informally assessed to be mildly reduced  · Executive Functions	intact  · Executive Function Deficits Noted	mental flexibility    Motor Speech:   · Fluency	impaired  · Alternating Rapid Sounds	impaired  · Observations	slurred speech  · Articulation	imprecise  · Prosody	intact  · Rate	intact    Voice:   · Voice	WFL    5/30 PT  Bed Mobility Analysis:     · Bed Mobility Limitations	Pt OOB in chair    Transfer: Sit to Stand:     · Level of [place]	minimum assist (75% patients effort)  · Physical Assist/Nonphysical Assist	2 person assist  · Weight-Bearing Restrictions	weight-bearing as tolerated  · Assistive Device	rolling walker    Transfer: Stand to Sit:     · Level of [place]	minimum assist (75% patients effort)  · Physical Assist/Nonphysical Assist	2 person assist  · Weight-Bearing Restrictions	weight-bearing as tolerated  · Assistive Device	rolling walker    Sit/Stand Transfer Safety Analysis:     · Transfer Safety Concerns Noted	decreased safety awareness; decreased proprioception; decreased sequencing ability; decreased weight-shifting ability; difficulty gripping and maintaining Left hand on RW, + left/posterior lean throughout.  · Impairments Contributing to Impaired Transfers	impaired balance; impaired postural control; decreased strength; impaired motor control; impaired coordination    Gait Skills:     · Level of [place]	moderate assist (50% patients effort)  · Physical Assist/Nonphysical Assist	1 person + 1 person to manage equipment; 2nd assist for line management  · Weight-Bearing Restrictions	weight-bearing as tolerated  · Assistive Device	rolling walker  · Gait Distance	3 feet    5/30 OT  Bathing Training:   · Level of [place]	moderate assist (50% patients effort); maximum assist (25% patients effort)    Upper Body Dressing Training:   · Level of [place]	moderate assist (50% patients effort)    Lower Body Dressing Training:   · Level of [place]	maximum assist (25% patients effort)    Toilet Hygiene Training:   · Level of [place]	to assess    Grooming Training:   · Level of [place]	moderate assist (50% patients effort)    Eating/Self-Feeding Training:   · Level of [place]	Did not directly observe      VITALS  T(C): 36.6 (05-31-23 @ 07:30), Max: 37.2 (05-30-23 @ 09:00)  HR: 88 (05-31-23 @ 08:00) (68 - 98)  BP: 149/59 (05-31-23 @ 08:00) (110/46 - 149/59)  RR: 16 (05-31-23 @ 08:00) (16 - 26)  SpO2: 99% (05-31-23 @ 08:00) (96% - 100%)  Wt(kg): --    MEDICATIONS   albuterol    0.083% 2.5 milliGRAM(s) every 6 hours PRN  aspirin  chewable 81 milliGRAM(s) daily  budesonide 160 MICROgram(s)/formoterol 4.5 MICROgram(s) Inhaler 2 Puff(s) two times a day  cefTRIAXone Injectable.     cefTRIAXone Injectable. 1000 milliGRAM(s) every 24 hours  chlorhexidine 2% Cloths 1 Application(s) daily  digoxin     Tablet 125 MICROGram(s) daily  enoxaparin Injectable 40 milliGRAM(s) every 24 hours  famotidine    Tablet 20 milliGRAM(s) daily  hydrALAZINE Injectable 10 milliGRAM(s) every 2 hours PRN  iron sucrose Injectable 200 milliGRAM(s) every 24 hours  labetalol Injectable 10 milliGRAM(s) every 2 hours PRN  levothyroxine 112 MICROGram(s) daily  polyethylene glycol 3350 17 Gram(s) daily  senna 2 Tablet(s) at bedtime  simvastatin 10 milliGRAM(s) at bedtime      RECENT LABS/IMAGING  - Reviewed Today                        7.8    9.19  )-----------( 128      ( 31 May 2023 06:14 )             24.2     05-31    143  |  110<H>  |  18.5  ----------------------------<  99  4.0   |  24.0  |  0.53    Ca    8.5      31 May 2023 06:14  Phos  2.6     05-31  Mg     2.0     05-31    TPro  5.9<L>  /  Alb  3.0<L>  /  TBili  0.4  /  DBili  x   /  AST  18  /  ALT  17  /  AlkPhos  66  05-31          BLE V DOPPLERS 5/29 - No evidence of deep venous thrombosis in either lower extremity.    HEAD CT 5/30 - 1. Right anterior MCA acute infarction is suspected 2. Right PCA distribution remote infarction 3. Ischemic white matter disease and atrophy typical for age 4. En plaque meningioma right anterior cranial fossa floor 5. Intracranial atherosclerosis        -------------------------------------------------------------------------------------------------  PHYSICAL EXAM  Constitutional - NAD, Comfortable  Extremities - No edema  Neurologic Exam -                    Cognitive - AAOx4     Communication - Expressive deficits, Delayed processing, +Dysarthria     Cranial Nerves - Left lip droop      Motor -  Left hemiparesis, Impaired fine motor/coordination      Sensory - Intact to LT  Psychiatric - Mood stable, Affect WNL  ----------------------------------------------------------------------------------------  ASSESSMENT/PLAN  89yFemale with functional deficits after an acute CVA  Acute right M1 occlusion s/p TNK & thrombectomy - ASA, Zocor  AFIB - Digoxin  HTN- Hydralazine, Labetalol  Anemia - Iron  Pulm - Albuterol, Symbicort  UTI - Rocephin   Pain - Tylenol  DVT PPX - SCDs, Lovenox  Rehab/Impaired mobility and function - Patient continues to require hospitalization for the above diagnoses and ongoing active management of comorbid complications (IV HTN medications) that are substantially impairing functional ability and impairing quality of life necessitating acute rehab.    Based on the patient's diagnosis, functional status and potential for progress, recommend ACUTE inpatient rehabilitation for the functional deficits consisting of 3 hours of therapy/day & 24 hour RN/daily PMR physician for comorbid medical management. Patient will be able to tolerate 3 hours a day.    Will continue to follow. Rehab recommendations are dependent on how functional progress changes as well as how patient continues to participate and tolerate therapeutic interventions, which may change. Recommend ongoing mobilization by staff to maintain cardiopulmonary function and prevention of secondary complications related to debility. Discussed the specific management and recommendations above with rehab clinical care team/rehab liaison.

## 2023-05-31 NOTE — PROGRESS NOTE ADULT - ASSESSMENT
ASSESSMENT: 89F with PMH afib recently taken off AC about 1 week post fall noted s/p fall, PPM, who presented with Right  gaze deviation and Left sided weakness. Last known well 1900 5/28/23, was found by  at 2100 that day with symptoms. Patient taken to [facility], code stroke initiated, NIH 26, found to have Right M1 and basilar apex occlusion on CT Angiogram, post tenecteplase. Subsequently underwent mechanical thrombectomy with TICI 3 recanalization.  Etiology likely cardioembolic in setting of atrial fibrillation. Basilar thrombus appeared to embolize to on angiogram post tenecteplase to left P1 origin, left PCA filling via left PComm.     NEURO: neurologically overall improving post thrombectomy, now with what appears to be a right visual neglect, she reports she wears glasses unclear if contributory but would screen for recurrent infarction, Continue close monitoring for neurologic deterioration , stroke neuro checks  q 2 ,  SBP goal post thrombectomy 110-140mmHg , titrate statin to LDL goal less than 70, Suggest MRI (d/w radiology to complete around 11am), if delayed then obtain CT head, Physical therapy/OT/Speech eval/treatment.     ANTITHROMBOTIC THERAPY: on ASA currently, Patient has a history of atrial fibrillation now with cardioembolic event. If no absolute contraindication or significant risk of hemorrhage and overall benefit> risks then suggest to resume Apixaban 2.5mg BID permitting stable repeat neuro imaging that is pending. ASA based on cardiology/medical indication.  Patient needs extensive monitoring and support for all ADL to avoid and minimize fall risk.  At the request of the patient was d/w son who is a physician by Dr. Toure; son amenable at this time, discussed associated risk of hemorrhage but at this time from neurological standpoint overall benefit>risk for Anticoagulation. Patient does have significant fall risk (multiple falls).     PULMONARY: protecting airway, saturating well     CARDIOVASCULAR:  TTE as noted, EF 60-65%, mild concentric LVH, moderate to severe LAE, moderately enlarged RA, mild MVR, mild AR, mild-moderate TR, mild pulmonary htn, cardiac monitoring to ensure rate control.  If not candidate for AC suggest to consider watchman. May discuss DARIUS closure with outpatient cardiologist.     GASTROINTESTINAL:  dysphagia screen deferred to SLP- who suggested on repeat evaluation regular/thin.     RENAL: BUN/Cr elevated but improving, monitor urine output, maintain adequate hydration       Na Goal:  135-145    HEMATOLOGY: H/H with anemia, 128, patient should have all age and risk appropriate malignancy screenings with PCP or sooner if clinically suspected, close monitoring, transfusion as needed, consider guaiac , venofer infusion in progress, screen for source of bleeding, heme consult.      DVT ppx: LMWH     ID: afebrile,  leukocytosis, + UTI, cx and abx per primary team- adjustment as needed pending sensitivity .     OTHER:  condition and plan of care d/w patient, questions and concerns addressed. D/W primary team attending and NP.      DISPOSITION: Rehab or home depending on PT eval once stable and workup is complete      CORE MEASURES:        Admission NIHSS:  26     Tenecteplase : [x] YES [] NO      LDL/HDL/A1C: 26/27/5.7     Depression Screen- if depression hx and/or present      Statin Therapy: as noted      Dysphagia Screen: [] PASS [] FAIL  SLP      Smoking [] YES [x] NO      Afib [x] YES [] NO     Stroke Education [x] YES [] NO    Obtain screening lower extremity venous ultrasound in patients who meet 1 or more of the following criteria as patient is high risk for DVT/PE on admission:   [] History of DVT/PE  []Hypercoagulable states (Factor V Leiden, Cancer, OCP, etc. )  []Prolonged immobility (hemiplegia/hemiparesis/post operative or any other extended immobilization)  [] Transferred from outside facility (Rehab or Long term care)  [] Age </= to 50

## 2023-05-31 NOTE — PROCEDURE NOTE - ADDITIONAL PROCEDURE DETAILS
DOI: 6/7/2022 by Dr. Terrell SANCHEZ  Battery life with > 8 years remaining   Device is conditionally approved for use with MRI. MRI department will contact [employer] for reprogramming during scan. Order form completed and placed in chart. Please call EP service with any questions or concerns. DOI: 6/7/2022 by Dr. Terrell SANCHEZ  Battery life with > 8 years remaining     Pending Chest XRAY to confirm any abandoned hardware:  Device is conditionally approved for use with MRI. MRI department will contact [employer] for reprogramming during scan. Order form completed and placed in chart. Please call EP service with any questions or concerns.

## 2023-05-31 NOTE — PROGRESS NOTE ADULT - ASSESSMENT
89 y/o F w/ PMH of AF (Eliquis stopped 5/23 by cardiologist due to freq falls), HTN, asthma/bronchiectasis, latent TB, hypothroidism, HLD, arthritis, chronic dizziness, macular degeneration. leadless pacemaker for SSS (placed 6/7/22), who presented on 5/28 to [facility] with aphasia and LUE weakness and found to have a Rt M1 occlusion.  Pt received TNK and transferred to [facility] neuroICU as a code biplane.  She was emergently taken to angio for thrombectomy where TICI 3 revascularization of the Rt M1 was obtained.  Pts neurologic exam has continued to improved.  Pt was noted to have worsening mentation when SBP <110 so she was maintained on светлана x 24 hours and was d/c'd this morning.  Her SBP has since been liberalized and neurologic exam continues to improved.  UA on admission noted to be positive, UCx w/ E.coli, currently on ceftriaxone.  Pt medically stable for transfer to medicine SDU.      Acute CVA   - Likely 2/2 AF off AC   - s/p TNK and Rt M1 thrombectomy 05/29  - Clinically improving although still w/ L-sided weakness and word finding   - Neurochecks q2h  - MRI Brain pending  - Neurology recs appreciated  - Continue Aspirin 81mg daily  - Continue Zocor 10mg nightly     AF   - Rate controlled   - Not on AC at this time   - Digoxin 125mcg daily  - EP recs appreciated  - Cardiology recs appreciated    Normocytic anemia   - Hemodynamically stable   - s/p TKA   - No active bleeding   - Monitor H/H and transfuse as needed    Uncomplicated UTI  - UA w/ pyuria and Ucx growing E.coli   - Complete course of Ceftriaxone today (5/31)  - Leukocytosis likely from UTI and reactive s/p intervention     Hypothyroidism  - Synthroid 112mcg daily    DVT ppx  - Lovenox SQ

## 2023-05-31 NOTE — PROGRESS NOTE ADULT - SUBJECTIVE AND OBJECTIVE BOX
Chief complaint: Stroke    Patient seen and examined at bedside. No acute overnight events reported. No fever, chills, cough, nausea or vomiting.     Vital Signs Last 24 Hrs  T(F): 97.9 (31 May 2023 07:30), Max: 98.5 (30 May 2023 11:47)  HR: 76 (31 May 2023 10:00) (68 - 98)  BP: 122/39 (31 May 2023 10:00) (110/46 - 149/59)  RR: 21 (31 May 2023 10:00) (16 - 26)  SpO2: 97% (31 May 2023 10:00) (96% - 100%)    Physical Exam:  Constitutional: alert and oriented, in no acute distress   Neck: Soft and supple  Respiratory: Good air entry b/l  Cardiovascular: regular rate and rhythm  Gastrointestinal: Soft, non-tender to palpation, +bs  Vascular: 2+ peripheral pulses  Neurological: A/O x 3  Musculoskeletal: no lower extremity edema bilaterally    Labs:                        7.8    9.19  )-----------( 128      ( 31 May 2023 06:14 )             24.2   05-31    143  |  110<H>  |  18.5  ----------------------------<  99  4.0   |  24.0  |  0.53    Ca    8.5      31 May 2023 06:14  Phos  2.6     05-31  Mg     2.0     05-31    TPro  5.9<L>  /  Alb  3.0<L>  /  TBili  0.4  /  DBili  x   /  AST  18  /  ALT  17  /  AlkPhos  66  05-31

## 2023-05-31 NOTE — PROCEDURE NOTE - NSICDXPROBLEMMLMBOX_GEN
IPSTART~^~1~^~[id number]~^~~^~IPEND  PKSTART~^~[id number]~^~PKEND  IPSTART~^~2~^~[id number]~^~~^~IPEND  PKSTART~^~[id number]~^~PKEND

## 2023-05-31 NOTE — PROGRESS NOTE ADULT - SUBJECTIVE AND OBJECTIVE BOX
Preliminary note, offical recommendations pending attending review/signature   [facility] Stroke Team  Progress Note     HPI:  89F with PMH afib recently taken off AC who presented with Right  gaze deviation and Left sided weakness. Last known well 1900 5/28/23, was found by  at 2100 that day with symptoms. Patient taken to [facility], code stroke initiated, NIH 26, found to have Right M1 occlusion on CTA, additionally noted basilar thrombus per Dr. Perez . Patient was given Tenecteplase at 23:33 5/28/23 and was transferred to [facility] for mechanical thrombectomy. On arrival, patient aphasic, unable to perform ROS.   NIH 26, mRS 0 on admission    SUBJECTIVE: No events overnight.  No new neurologic complaints.  ROS reported negative unless otherwise noted.    albuterol    0.083% 2.5 milliGRAM(s) Nebulizer every 6 hours PRN  aspirin  chewable 81 milliGRAM(s) Oral daily  budesonide 160 MICROgram(s)/formoterol 4.5 MICROgram(s) Inhaler 2 Puff(s) Inhalation two times a day  cefTRIAXone Injectable.      cefTRIAXone Injectable. 1000 milliGRAM(s) IV Push every 24 hours  chlorhexidine 2% Cloths 1 Application(s) Topical daily  digoxin     Tablet 125 MICROGram(s) Oral daily  enoxaparin Injectable 40 milliGRAM(s) SubCutaneous every 24 hours  famotidine    Tablet 20 milliGRAM(s) Oral daily  hydrALAZINE Injectable 10 milliGRAM(s) IV Push every 2 hours PRN  iron sucrose Injectable 200 milliGRAM(s) IV Push every 24 hours  labetalol Injectable 10 milliGRAM(s) IV Push every 2 hours PRN  levothyroxine 112 MICROGram(s) Oral daily  polyethylene glycol 3350 17 Gram(s) Oral daily  senna 2 Tablet(s) Oral at bedtime  simvastatin 10 milliGRAM(s) Oral at bedtime      PHYSICAL EXAM:   Vital Signs Last 24 Hrs  T(C): 36.8 (31 May 2023 04:10), Max: 37.3 (30 May 2023 07:30)  T(F): 98.2 (31 May 2023 04:10), Max: 99.1 (30 May 2023 07:30)  HR: 70 (31 May 2023 04:00) (61 - 98)  BP: 133/68 (31 May 2023 04:00) (110/46 - 139/62)  BP(mean): 85 (31 May 2023 04:00) (64 - 96)  RR: 20 (31 May 2023 04:00) (17 - 26)  SpO2: 99% (31 May 2023 04:00) (96% - 100%)    Parameters below as of 31 May 2023 04:00  Patient On (Oxygen Delivery Method): room air    EXAM PENDING      General: NAD, in bedside chair     Detailed Neurologic Exam:    Mental status: The patient is awake and alert, oriented to self, place, may,  year.  The patient is able to name objects  , follows simple commands, repeats , speech overall fluent.    Cranial nerves: Dysarthria, left facial palsy,  VF appear overall full to movement Extraocular motion is full with no nystagmus. There is no ptosis. Tongue midline.     Motor: There is normal bulk and tone.  There is no tremor.  Strength is 5/5 in the right arm and  4/5 leg.   Strength is 4/5 in the left arm and 4-/5 leg. Drift in arm/leg.    Sensation: Intact to light touch and pin in 4 extremities, left sensory extinction     Cerebellar: There is no dysmetria on finger to nose testing.    Gait : deferred      LABS:                        8.9    14.33 )-----------( 183      ( 30 May 2023 04:48 )             27.5    05-30    141  |  108  |  20.8<H>  ----------------------------<  100<H>  4.1   |  23.0  |  0.62    Ca    8.4      30 May 2023 04:48  Phos  3.3     05-30  Mg     2.2     05-30          IMAGING: Reviewed by me.     see [facility] ([facility]     CT Head No Cont (05.30.23 @ 00:56)   IMPRESSION:  1. Right anterior MCA acute infarction is suspected  2. Right PCA distribution remote infarction  3. Ischemic white matter disease and atrophy typical for age  4. En plaque meningioma right anterior cranial fossa floor  5. Intracranial atherosclerosis       TTE Echo Complete w/ Contrast w/ Doppler (05.29.23 @ 12:49)   Summary:   1. Left ventricular ejection fraction, by visual estimation, is 60 to   65%.   2. Normal global left ventricular systolic function.   3. The mitral in-flow pattern reveals no discernable A-wave, therefore   no comment on diastolic function can be made.   4. There is mild concentric left ventricular hypertrophy.   5. Normal right ventricular sizeand function.   6. Moderate to severe left atrial enlargement.   7. Moderately enlarged right atrium.   8. Mild mitral valve regurgitation.   9. Sclerotic aortic valve with normal opening.  10. Mild aortic regurgitation.  11. Mild-moderate tricuspid regurgitation.  12. Estimated pulmonary artery systolic pressure is 40.2 mmHg assuming a   right atrial pressure of 10 mmHg, which is consistent with mild pulmonary   hypertension.  13. There is no evidence of pericardial effusion.     TTE Echo Limited or F/U (05.30.23 @ 15:15)   Summary:   1. Limited follow up agitated saline study.   2. Normal global left ventricular systolic function.   3. Left ventricular ejection fraction, by visual estimation, is 60 to   65%.   4. Color flow doppler and intravenous injection of agitated saline   demonstrates the presence of an intact intra atrial septum.                 Preliminary note, offical recommendations pending attending review/signature   [facility] Stroke Team  Progress Note     HPI:  89F with PMH afib recently taken off AC who presented with Right  gaze deviation and Left sided weakness. Last known well 1900 5/28/23, was found by  at 2100 that day with symptoms. [place] [facility], NIH 26, found to have Right M1 occlusion on CTA, additionally noted basilar thrombus per Dr. Perez . Patient was given Tenecteplase at 23:33 5/28/23 and was transferred to [facility] for mechanical thrombectomy. On arrival, patient aphasic, unable to perform ROS.   NIH 26, mRS 0 on admission    SUBJECTIVE: No events overnight.  No new neurologic complaints. Inquiring about rehab.  Denies headache/neausea/vomiting/dizziness.  ROS reported negative unless otherwise noted.    albuterol    0.083% 2.5 milliGRAM(s) Nebulizer every 6 hours PRN  aspirin  chewable 81 milliGRAM(s) Oral daily  budesonide 160 MICROgram(s)/formoterol 4.5 MICROgram(s) Inhaler 2 Puff(s) Inhalation two times a day  cefTRIAXone Injectable.      cefTRIAXone Injectable. 1000 milliGRAM(s) IV Push every 24 hours  chlorhexidine 2% Cloths 1 Application(s) Topical daily  digoxin     Tablet 125 MICROGram(s) Oral daily  enoxaparin Injectable 40 milliGRAM(s) SubCutaneous every 24 hours  famotidine    Tablet 20 milliGRAM(s) Oral daily  hydrALAZINE Injectable 10 milliGRAM(s) IV Push every 2 hours PRN  iron sucrose Injectable 200 milliGRAM(s) IV Push every 24 hours  labetalol Injectable 10 milliGRAM(s) IV Push every 2 hours PRN  levothyroxine 112 MICROGram(s) Oral daily  polyethylene glycol 3350 17 Gram(s) Oral daily  senna 2 Tablet(s) Oral at bedtime  simvastatin 10 milliGRAM(s) Oral at bedtime      PHYSICAL EXAM:   Vital Signs Last 24 Hrs  T(C): 36.8 (31 May 2023 04:10), Max: 37.3 (30 May 2023 07:30)  T(F): 98.2 (31 May 2023 04:10), Max: 99.1 (30 May 2023 07:30)  HR: 70 (31 May 2023 04:00) (61 - 98)  BP: 133/68 (31 May 2023 04:00) (110/46 - 139/62)  BP(mean): 85 (31 May 2023 04:00) (64 - 96)  RR: 20 (31 May 2023 04:00) (17 - 26)  SpO2: 99% (31 May 2023 04:00) (96% - 100%)    Parameters below as of 31 May 2023 04:00  Patient On (Oxygen Delivery Method): room air        General: NAD, in bed     Detailed Neurologic Exam:    Mental status: The patient is awake and alert, oriented to self, place, may,  year.  The patient is able to name objects  , follows simple commands, repeat , speech overall fluent.    Cranial nerves: Dysarthria, left facial palsy,  VF appear overall full to movement but appears now with right visual extinction. Extraocular motion is full with no nystagmus. There is no ptosis. Tongue midline.     Motor: There is normal bulk and tone.  There is no tremor.  Strength is 5/5 in the right arm and  4/5 leg.   Strength is 4+/5 in the left arm and 4-/5 leg. Drift in arm/leg.    Sensation: Intact to light touch and pin in 4 extremities, left sensory extinction     Cerebellar: There is no dysmetria on finger to nose testing.    Gait : deferred      LABS:                        8.9    14.33 )-----------( 183      ( 30 May 2023 04:48 )             27.5    05-30    141  |  108  |  20.8<H>  ----------------------------<  100<H>  4.1   |  23.0  |  0.62    Ca    8.4      30 May 2023 04:48  Phos  3.3     05-30  Mg     2.2     05-30          IMAGING: Reviewed by me.     see [facility] ([facility])     CT Head No Cont (05.30.23 @ 00:56)   IMPRESSION:  1. Right anterior MCA acute infarction is suspected  2. Right PCA distribution remote infarction  3. Ischemic white matter disease and atrophy typical for age  4. En plaque meningioma right anterior cranial fossa floor  5. Intracranial atherosclerosis       TTE Echo Complete w/ Contrast w/ Doppler (05.29.23 @ 12:49)   Summary:   1. Left ventricular ejection fraction, by visual estimation, is 60 to   65%.   2. Normal global left ventricular systolic function.   3. The mitral in-flow pattern reveals no discernable A-wave, therefore   no comment on diastolic function can be made.   4. There is mild concentric left ventricular hypertrophy.   5. Normal right ventricular sizeand function.   6. Moderate to severe left atrial enlargement.   7. Moderately enlarged right atrium.   8. Mild mitral valve regurgitation.   9. Sclerotic aortic valve with normal opening.  10. Mild aortic regurgitation.  11. Mild-moderate tricuspid regurgitation.  12. Estimated pulmonary artery systolic pressure is 40.2 mmHg assuming a   right atrial pressure of 10 mmHg, which is consistent with mild pulmonary   hypertension.  13. There is no evidence of pericardial effusion.     TTE Echo Limited or F/U (05.30.23 @ 15:15)   Summary:   1. Limited follow up agitated saline study.   2. Normal global left ventricular systolic function.   3. Left ventricular ejection fraction, by visual estimation, is 60 to   65%.   4. Color flow doppler and intravenous injection of agitated saline   demonstrates the presence of an intact intra atrial septum.                 Preliminary note, offical recommendations pending attending review/signature   [facility] Stroke Team  Progress Note     HPI:  89F with PMH afib recently taken off AC who presented with Right  gaze deviation and Left sided weakness. Last known well 1900 5/28/23, was found by  at 2100 that day with symptoms. [place] [facility] code [place] initiated, NIH 26, found to have Right M1 occlusion on CTA, additionally noted basilar thrombus per Dr. Perez . Patient was given Tenecteplase at 23:33 5/28/23 and was transferred to [facility] for mechanical thrombectomy. On arrival, patient aphasic, unable to perform ROS.   NIH 26, mRS 0 on admission    SUBJECTIVE: No events overnight.  No new neurologic complaints. Inquiring about rehab.  Denies headache/neausea/vomiting/dizziness.  ROS reported negative unless otherwise noted.    albuterol    0.083% 2.5 milliGRAM(s) Nebulizer every 6 hours PRN  aspirin  chewable 81 milliGRAM(s) Oral daily  budesonide 160 MICROgram(s)/formoterol 4.5 MICROgram(s) Inhaler 2 Puff(s) Inhalation two times a day  cefTRIAXone Injectable.      cefTRIAXone Injectable. 1000 milliGRAM(s) IV Push every 24 hours  chlorhexidine 2% Cloths 1 Application(s) Topical daily  digoxin     Tablet 125 MICROGram(s) Oral daily  enoxaparin Injectable 40 milliGRAM(s) SubCutaneous every 24 hours  famotidine    Tablet 20 milliGRAM(s) Oral daily  hydrALAZINE Injectable 10 milliGRAM(s) IV Push every 2 hours PRN  iron sucrose Injectable 200 milliGRAM(s) IV Push every 24 hours  labetalol Injectable 10 milliGRAM(s) IV Push every 2 hours PRN  levothyroxine 112 MICROGram(s) Oral daily  polyethylene glycol 3350 17 Gram(s) Oral daily  senna 2 Tablet(s) Oral at bedtime  simvastatin 10 milliGRAM(s) Oral at bedtime      PHYSICAL EXAM:   Vital Signs Last 24 Hrs  T(C): 36.8 (31 May 2023 04:10), Max: 37.3 (30 May 2023 07:30)  T(F): 98.2 (31 May 2023 04:10), Max: 99.1 (30 May 2023 07:30)  HR: 70 (31 May 2023 04:00) (61 - 98)  BP: 133/68 (31 May 2023 04:00) (110/46 - 139/62)  BP(mean): 85 (31 May 2023 04:00) (64 - 96)  RR: 20 (31 May 2023 04:00) (17 - 26)  SpO2: 99% (31 May 2023 04:00) (96% - 100%)    Parameters below as of 31 May 2023 04:00  Patient On (Oxygen Delivery Method): room air        General: NAD, in bed     Detailed Neurologic Exam:    Mental status: The patient is awake and alert, oriented to self, place, may,  year.  The patient is able to name objects  , follows simple commands, repeat , speech overall fluent.    Cranial nerves: Dysarthria, left facial palsy,  VF full with prompting,  but appears now with right visual extinction. Extraocular motion is full with no nystagmus. There is no ptosis. Tongue midline.     Motor: There is normal bulk and tone.  There is no tremor.  Strength is 5/5 in the right arm and  4/5 leg.   Strength is 4+/5 in the left arm and 4-/5 leg. Drift in arm/leg.    Sensation: Intact to light touch and pin in 4 extremities, left sensory extinction     Cerebellar: There is no dysmetria on finger to nose testing.    Gait : deferred      LABS:                        8.9    14.33 )-----------( 183      ( 30 May 2023 04:48 )             27.5    05-30    141  |  108  |  20.8<H>  ----------------------------<  100<H>  4.1   |  23.0  |  0.62    Ca    8.4      30 May 2023 04:48  Phos  3.3     05-30  Mg     2.2     05-30          IMAGING: Reviewed by me.     see [facility] ([facility])     CT Head No Cont (05.30.23 @ 00:56)   IMPRESSION:  1. Right anterior MCA acute infarction is suspected  2. Right PCA distribution remote infarction  3. Ischemic white matter disease and atrophy typical for age  4. En plaque meningioma right anterior cranial fossa floor  5. Intracranial atherosclerosis       TTE Echo Complete w/ Contrast w/ Doppler (05.29.23 @ 12:49)   Summary:   1. Left ventricular ejection fraction, by visual estimation, is 60 to   65%.   2. Normal global left ventricular systolic function.   3. The mitral in-flow pattern reveals no discernable A-wave, therefore   no comment on diastolic function can be made.   4. There is mild concentric left ventricular hypertrophy.   5. Normal right ventricular sizeand function.   6. Moderate to severe left atrial enlargement.   7. Moderately enlarged right atrium.   8. Mild mitral valve regurgitation.   9. Sclerotic aortic valve with normal opening.  10. Mild aortic regurgitation.  11. Mild-moderate tricuspid regurgitation.  12. Estimated pulmonary artery systolic pressure is 40.2 mmHg assuming a   right atrial pressure of 10 mmHg, which is consistent with mild pulmonary   hypertension.  13. There is no evidence of pericardial effusion.     TTE Echo Limited or F/U (05.30.23 @ 15:15)   Summary:   1. Limited follow up agitated saline study.   2. Normal global left ventricular systolic function.   3. Left ventricular ejection fraction, by visual estimation, is 60 to   65%.   4. Color flow doppler and intravenous injection of agitated saline   demonstrates the presence of an intact intra atrial septum.                 Preliminary note, offical recommendations pending attending review/signature   [facility] Stroke Team  Progress Note     HPI:  89F with PMH afib recently taken off AC who presented with Right  gaze deviation and Left sided weakness. Last known well 1900 5/28/23, was found by  at 2100 that day with symptoms. [place] [facility] [place], NIH 26, found to have Right M1 occlusion on CTA, additionally noted basilar thrombus per Dr. Perez . Patient was given Tenecteplase at 23:33 5/28/23 and was transferred to [facility] for mechanical thrombectomy. On arrival, patient aphasic, unable to perform ROS.   NIH 26, mRS 0 on admission    SUBJECTIVE: No events overnight.  No new neurologic complaints. Inquiring about rehab.  Denies headache/neausea/vomiting/dizziness.  ROS reported negative unless otherwise noted.    albuterol    0.083% 2.5 milliGRAM(s) Nebulizer every 6 hours PRN  aspirin  chewable 81 milliGRAM(s) Oral daily  budesonide 160 MICROgram(s)/formoterol 4.5 MICROgram(s) Inhaler 2 Puff(s) Inhalation two times a day  cefTRIAXone Injectable.      cefTRIAXone Injectable. 1000 milliGRAM(s) IV Push every 24 hours  chlorhexidine 2% Cloths 1 Application(s) Topical daily  digoxin     Tablet 125 MICROGram(s) Oral daily  enoxaparin Injectable 40 milliGRAM(s) SubCutaneous every 24 hours  famotidine    Tablet 20 milliGRAM(s) Oral daily  hydrALAZINE Injectable 10 milliGRAM(s) IV Push every 2 hours PRN  iron sucrose Injectable 200 milliGRAM(s) IV Push every 24 hours  labetalol Injectable 10 milliGRAM(s) IV Push every 2 hours PRN  levothyroxine 112 MICROGram(s) Oral daily  polyethylene glycol 3350 17 Gram(s) Oral daily  senna 2 Tablet(s) Oral at bedtime  simvastatin 10 milliGRAM(s) Oral at bedtime      PHYSICAL EXAM:   Vital Signs Last 24 Hrs  T(C): 36.8 (31 May 2023 04:10), Max: 37.3 (30 May 2023 07:30)  T(F): 98.2 (31 May 2023 04:10), Max: 99.1 (30 May 2023 07:30)  HR: 70 (31 May 2023 04:00) (61 - 98)  BP: 133/68 (31 May 2023 04:00) (110/46 - 139/62)  BP(mean): 85 (31 May 2023 04:00) (64 - 96)  RR: 20 (31 May 2023 04:00) (17 - 26)  SpO2: 99% (31 May 2023 04:00) (96% - 100%)    Parameters below as of 31 May 2023 04:00  Patient On (Oxygen Delivery Method): room air        General: NAD, in bed     Detailed Neurologic Exam:    Mental status: The patient is awake and alert, oriented to self, place, may,  year.  The patient is able to name objects  , follows simple commands, repeat , speech overall fluent.    Cranial nerves: Dysarthria, left facial palsy,  VF full with prompting,  but appears now with right visual extinction. Extraocular motion is full with no nystagmus. There is no ptosis. Tongue midline.     Motor: There is normal bulk and tone.  There is no tremor.  Strength is 5/5 in the right arm and  4/5 leg.   Strength is 4+/5 in the left arm and 4-/5 leg. Drift in arm/leg.    Sensation: Intact to light touch and pin in 4 extremities, left sensory extinction     Cerebellar: There is no dysmetria on finger to nose testing.    Gait : deferred      LABS:                        8.9    14.33 )-----------( 183      ( 30 May 2023 04:48 )             27.5    05-30    141  |  108  |  20.8<H>  ----------------------------<  100<H>  4.1   |  23.0  |  0.62    Ca    8.4      30 May 2023 04:48  Phos  3.3     05-30  Mg     2.2     05-30          IMAGING: Reviewed by me.     [place] [facility] [place]     CT Head No Cont (05.30.23 @ 00:56)   IMPRESSION:  1. Right anterior MCA acute infarction is suspected  2. Right PCA distribution remote infarction  3. Ischemic white matter disease and atrophy typical for age  4. En plaque meningioma right anterior cranial fossa floor  5. Intracranial atherosclerosis       TTE Echo Complete w/ Contrast w/ Doppler (05.29.23 @ 12:49)   Summary:   1. Left ventricular ejection fraction, by visual estimation, is 60 to   65%.   2. Normal global left ventricular systolic function.   3. The mitral in-flow pattern reveals no discernable A-wave, therefore   no comment on diastolic function can be made.   4. There is mild concentric left ventricular hypertrophy.   5. Normal right ventricular sizeand function.   6. Moderate to severe left atrial enlargement.   7. Moderately enlarged right atrium.   8. Mild mitral valve regurgitation.   9. Sclerotic aortic valve with normal opening.  10. Mild aortic regurgitation.  11. Mild-moderate tricuspid regurgitation.  12. Estimated pulmonary artery systolic pressure is 40.2 mmHg assuming a   right atrial pressure of 10 mmHg, which is consistent with mild pulmonary   hypertension.  13. There is no evidence of pericardial effusion.     TTE Echo Limited or F/U (05.30.23 @ 15:15)   Summary:   1. Limited follow up agitated saline study.   2. Normal global left ventricular systolic function.   3. Left ventricular ejection fraction, by visual estimation, is 60 to   65%.   4. Color flow doppler and intravenous injection of agitated saline   demonstrates the presence of an intact intra atrial septum.           CT Head of 5-31-23 Reviewed and compared with CT head of 5-30-23.No new infarct seen. Official report pending.

## 2023-06-01 LAB
ANION GAP SERPL CALC-SCNC: 10 MMOL/L — SIGNIFICANT CHANGE UP (ref 5–17)
BASOPHILS # BLD AUTO: 0.04 K/UL — SIGNIFICANT CHANGE UP (ref 0–0.2)
BASOPHILS NFR BLD AUTO: 0.4 % — SIGNIFICANT CHANGE UP (ref 0–2)
BUN SERPL-MCNC: 17.5 MG/DL — SIGNIFICANT CHANGE UP (ref 8–20)
CALCIUM SERPL-MCNC: 8.6 MG/DL — SIGNIFICANT CHANGE UP (ref 8.4–10.5)
CHLORIDE SERPL-SCNC: 106 MMOL/L — SIGNIFICANT CHANGE UP (ref 96–108)
CO2 SERPL-SCNC: 26 MMOL/L — SIGNIFICANT CHANGE UP (ref 22–29)
CREAT SERPL-MCNC: 0.56 MG/DL — SIGNIFICANT CHANGE UP (ref 0.5–1.3)
EGFR: 86 ML/MIN/1.73M2 — SIGNIFICANT CHANGE UP
EOSINOPHIL # BLD AUTO: 0.38 K/UL — SIGNIFICANT CHANGE UP (ref 0–0.5)
EOSINOPHIL NFR BLD AUTO: 3.9 % — SIGNIFICANT CHANGE UP (ref 0–6)
GIANT PLATELETS BLD QL SMEAR: PRESENT — SIGNIFICANT CHANGE UP
GLUCOSE SERPL-MCNC: 97 MG/DL — SIGNIFICANT CHANGE UP (ref 70–99)
HCT VFR BLD CALC: 23.5 % — LOW (ref 34.5–45)
HGB BLD-MCNC: 7.6 G/DL — LOW (ref 11.5–15.5)
IMM GRANULOCYTES NFR BLD AUTO: 3.4 % — HIGH (ref 0–0.9)
LYMPHOCYTES # BLD AUTO: 1.29 K/UL — SIGNIFICANT CHANGE UP (ref 1–3.3)
LYMPHOCYTES # BLD AUTO: 13.1 % — SIGNIFICANT CHANGE UP (ref 13–44)
MANUAL SMEAR VERIFICATION: SIGNIFICANT CHANGE UP
MCHC RBC-ENTMCNC: 26.4 PG — LOW (ref 27–34)
MCHC RBC-ENTMCNC: 32.3 GM/DL — SIGNIFICANT CHANGE UP (ref 32–36)
MCV RBC AUTO: 81.6 FL — SIGNIFICANT CHANGE UP (ref 80–100)
MONOCYTES # BLD AUTO: 0.74 K/UL — SIGNIFICANT CHANGE UP (ref 0–0.9)
MONOCYTES NFR BLD AUTO: 7.5 % — SIGNIFICANT CHANGE UP (ref 2–14)
NEUTROPHILS # BLD AUTO: 7.07 K/UL — SIGNIFICANT CHANGE UP (ref 1.8–7.4)
NEUTROPHILS NFR BLD AUTO: 71.7 % — SIGNIFICANT CHANGE UP (ref 43–77)
PLAT MORPH BLD: ABNORMAL
PLATELET # BLD AUTO: 182 K/UL — SIGNIFICANT CHANGE UP (ref 150–400)
POTASSIUM SERPL-MCNC: 3.8 MMOL/L — SIGNIFICANT CHANGE UP (ref 3.5–5.3)
POTASSIUM SERPL-SCNC: 3.8 MMOL/L — SIGNIFICANT CHANGE UP (ref 3.5–5.3)
RBC # BLD: 2.88 M/UL — LOW (ref 3.8–5.2)
RBC # FLD: 15.4 % — HIGH (ref 10.3–14.5)
RBC BLD AUTO: ABNORMAL
SMUDGE CELLS # BLD: PRESENT — SIGNIFICANT CHANGE UP
SODIUM SERPL-SCNC: 142 MMOL/L — SIGNIFICANT CHANGE UP (ref 135–145)
WBC # BLD: 9.85 K/UL — SIGNIFICANT CHANGE UP (ref 3.8–10.5)
WBC # FLD AUTO: 9.85 K/UL — SIGNIFICANT CHANGE UP (ref 3.8–10.5)

## 2023-06-01 PROCEDURE — 99233 SBSQ HOSP IP/OBS HIGH 50: CPT

## 2023-06-01 PROCEDURE — 74230 X-RAY XM SWLNG FUNCJ C+: CPT | Mod: 26

## 2023-06-01 PROCEDURE — 99233 SBSQ HOSP IP/OBS HIGH 50: CPT | Mod: FS

## 2023-06-01 RX ORDER — APIXABAN 2.5 MG/1
2.5 TABLET, FILM COATED ORAL EVERY 12 HOURS
Refills: 0 | Status: DISCONTINUED | OUTPATIENT
Start: 2023-06-01 | End: 2023-06-06

## 2023-06-01 RX ADMIN — CEFTRIAXONE 1000 MILLIGRAM(S): 500 INJECTION, POWDER, FOR SOLUTION INTRAMUSCULAR; INTRAVENOUS at 14:47

## 2023-06-01 RX ADMIN — SIMVASTATIN 10 MILLIGRAM(S): 20 TABLET, FILM COATED ORAL at 21:08

## 2023-06-01 RX ADMIN — CHLORHEXIDINE GLUCONATE 1 APPLICATION(S): 213 SOLUTION TOPICAL at 05:00

## 2023-06-01 RX ADMIN — Medication 112 MICROGRAM(S): at 05:00

## 2023-06-01 RX ADMIN — SENNA PLUS 2 TABLET(S): 8.6 TABLET ORAL at 21:09

## 2023-06-01 RX ADMIN — Medication 10 MILLIGRAM(S): at 08:15

## 2023-06-01 RX ADMIN — APIXABAN 2.5 MILLIGRAM(S): 2.5 TABLET, FILM COATED ORAL at 17:09

## 2023-06-01 RX ADMIN — BUDESONIDE AND FORMOTEROL FUMARATE DIHYDRATE 2 PUFF(S): 160; 4.5 AEROSOL RESPIRATORY (INHALATION) at 08:15

## 2023-06-01 RX ADMIN — IRON SUCROSE 200 MILLIGRAM(S): 20 INJECTION, SOLUTION INTRAVENOUS at 12:26

## 2023-06-01 RX ADMIN — POLYETHYLENE GLYCOL 3350 17 GRAM(S): 17 POWDER, FOR SOLUTION ORAL at 12:26

## 2023-06-01 RX ADMIN — BUDESONIDE AND FORMOTEROL FUMARATE DIHYDRATE 2 PUFF(S): 160; 4.5 AEROSOL RESPIRATORY (INHALATION) at 21:07

## 2023-06-01 RX ADMIN — FAMOTIDINE 20 MILLIGRAM(S): 10 INJECTION INTRAVENOUS at 12:26

## 2023-06-01 RX ADMIN — Medication 125 MICROGRAM(S): at 05:01

## 2023-06-01 NOTE — PROGRESS NOTE ADULT - SUBJECTIVE AND OBJECTIVE BOX
Preliminary note, offical recommendations pending attending review/signature   [facility] Stroke Team  Progress Note     HPI:  89F with PMH afib recently taken off AC who presented with Right  gaze deviation and Left sided weakness. Last known well 1900 5/28/23, was found by  at 2100 that day with symptoms. Patient taken to [facility], code stroke initiated, NIH 26, found to have Right M1 occlusion on CTA, additionally noted basilar thrombus per Dr. Perez . Patient was given Tenecteplase at 23:33 5/28/23 and was transferred to [facility] for mechanical thrombectomy. On arrival, patient aphasic, unable to perform ROS.   NIH 26, mRS 0 on admission    SUBJECTIVE: No events overnight.  No new neurologic complaints.  ROS reported negative unless otherwise noted.    albuterol    0.083% 2.5 milliGRAM(s) Nebulizer every 6 hours PRN  aspirin  chewable 81 milliGRAM(s) Oral daily  budesonide 160 MICROgram(s)/formoterol 4.5 MICROgram(s) Inhaler 2 Puff(s) Inhalation two times a day  cefTRIAXone Injectable.      cefTRIAXone Injectable. 1000 milliGRAM(s) IV Push every 24 hours  chlorhexidine 2% Cloths 1 Application(s) Topical daily  digoxin     Tablet 125 MICROGram(s) Oral daily  enoxaparin Injectable 40 milliGRAM(s) SubCutaneous every 24 hours  famotidine    Tablet 20 milliGRAM(s) Oral daily  hydrALAZINE Injectable 10 milliGRAM(s) IV Push every 2 hours PRN  iron sucrose Injectable 200 milliGRAM(s) IV Push every 24 hours  labetalol Injectable 10 milliGRAM(s) IV Push every 2 hours PRN  levothyroxine 112 MICROGram(s) Oral daily  polyethylene glycol 3350 17 Gram(s) Oral daily  senna 2 Tablet(s) Oral at bedtime  simvastatin 10 milliGRAM(s) Oral at bedtime      PHYSICAL EXAM:   Vital Signs Last 24 Hrs  T(C): 36.8 (01 Jun 2023 07:49), Max: 36.9 (31 May 2023 12:19)  T(F): 98.2 (01 Jun 2023 07:49), Max: 98.4 (31 May 2023 12:19)  HR: 60 (01 Jun 2023 10:00) (60 - 81)  BP: 115/47 (01 Jun 2023 10:00) (102/34 - 145/44)  BP(mean): 64 (01 Jun 2023 10:00) (52 - 103)  RR: 16 (01 Jun 2023 10:00) (16 - 18)  SpO2: 95% (01 Jun 2023 10:00) (95% - 100%)    Parameters below as of 01 Jun 2023 10:00  Patient On (Oxygen Delivery Method): room air    General: NAD, in bed       Mental status: The patient is awake and alert, oriented to self, place, may,  year.  The patient is able to name objects  , follows simple commands, repeat , speech overall fluent. No neglect.     Cranial nerves: Dysarthria, mild-moderate left facial palsy,  VF full with prompting- does break fixation but attends readily to hand movement , no right visual neglect appreciated . Extraocular motion is full with no nystagmus. There is no ptosis. Tongue midline.     Motor: There is normal bulk and tone.  There is no tremor.  Strength is 5/5 in the right arm and  4/5 leg.   Strength is 4+/5 in the left arm and 4/5 leg. Drift in arm/leg but does not hit bed.     Sensation: Intact to light touch and pin in 4 extremities, left sensory extinction resolved     Cerebellar: There is no dysmetria on finger to nose testing.    Gait : deferred    LABS:                        7.6    9.85  )-----------( 182      ( 01 Jun 2023 07:18 )             23.5    06-01    142  |  106  |  17.5  ----------------------------<  97  3.8   |  26.0  |  0.56    Ca    8.6      01 Jun 2023 07:18  Phos  2.6     05-31  Mg     2.0     05-31    TPro  5.9<L>  /  Alb  3.0<L>  /  TBili  0.4  /  DBili  x   /  AST  18  /  ALT  17  /  AlkPhos  66  05-31        IMAGING: Reviewed by me.   see [facility] ([facility])     CT Head No Cont (05.30.23 @ 00:56)   IMPRESSION:  1. Right anterior MCA acute infarction is suspected  2. Right PCA distribution remote infarction  3. Ischemic white matter disease and atrophy typical for age  4. En plaque meningioma right anterior cranial fossa floor  5. Intracranial atherosclerosis       TTE Echo Complete w/ Contrast w/ Doppler (05.29.23 @ 12:49)   Summary:   1. Left ventricular ejection fraction, by visual estimation, is 60 to   65%.   2. Normal global left ventricular systolic function.   3. The mitral in-flow pattern reveals no discernable A-wave, therefore   no comment on diastolic function can be made.   4. There is mild concentric left ventricular hypertrophy.   5. Normal right ventricular sizeand function.   6. Moderate to severe left atrial enlargement.   7. Moderately enlarged right atrium.   8. Mild mitral valve regurgitation.   9. Sclerotic aortic valve with normal opening.  10. Mild aortic regurgitation.  11. Mild-moderate tricuspid regurgitation.  12. Estimated pulmonary artery systolic pressure is 40.2 mmHg assuming a   right atrial pressure of 10 mmHg, which is consistent with mild pulmonary   hypertension.  13. There is no evidence of pericardial effusion.     TTE Echo Limited or F/U (05.30.23 @ 15:15)   Summary:   1. Limited follow up agitated saline study.   2. Normal global left ventricular systolic function.   3. Left ventricular ejection fraction, by visual estimation, is 60 to   65%.   4. Color flow doppler and intravenous injection of agitated saline   demonstrates the presence of an intact intra atrial septum.    CT Head No Cont (05.31.23 @ 17:20)     The ventricular and sulcal size and configuration is age appropriate.      There are moderate patchy and confluent areas of hypodensity in the   periventricular and subcortical white matter which are likely related to   chronic microangiopathic changes. There is more conspicuous loss of   gray-white differentiation involving the right caudate, right basal   ganglia and anterior right insula consistent with evolving infarction. No   interval hemorrhagic transformation. There is redemonstrated right   occipital chronic infarction.    There is no evidence of mass effect, midline shift, acute intracranial   hemorrhage, or extra-axial collections.   The calvarium is intact. The paranasal sinuses are clear.The mastoid air   cells are predominantly clear. There has been prior bilateral cataract   surgery.      Evolving acute infarction as described above. Nointerval hemorrhagic   transformation. Chronic microvascular ischemic changes and chronic right   occipital infarction.          [facility] Stroke Team  Progress Note     HPI:  89F with PMH afib recently taken off AC who presented with Right  gaze deviation and Left sided weakness. Last known well 1900 5/28/23, was found by  at 2100 that day with symptoms. [place] [facility], NIH 26, found to have Right M1 occlusion on CTA, additionally noted basilar thrombus per Dr. Perez . Patient was given Tenecteplase at 23:33 5/28/23 and was transferred to [facility] for mechanical thrombectomy. On arrival, patient aphasic, unable to perform ROS.   NIH 26, mRS 0 on admission    SUBJECTIVE: No events overnight.  No new neurologic complaints.  ROS reported negative unless otherwise noted.    albuterol    0.083% 2.5 milliGRAM(s) Nebulizer every 6 hours PRN  aspirin  chewable 81 milliGRAM(s) Oral daily  budesonide 160 MICROgram(s)/formoterol 4.5 MICROgram(s) Inhaler 2 Puff(s) Inhalation two times a day  cefTRIAXone Injectable.      cefTRIAXone Injectable. 1000 milliGRAM(s) IV Push every 24 hours  chlorhexidine 2% Cloths 1 Application(s) Topical daily  digoxin     Tablet 125 MICROGram(s) Oral daily  enoxaparin Injectable 40 milliGRAM(s) SubCutaneous every 24 hours  famotidine    Tablet 20 milliGRAM(s) Oral daily  hydrALAZINE Injectable 10 milliGRAM(s) IV Push every 2 hours PRN  iron sucrose Injectable 200 milliGRAM(s) IV Push every 24 hours  labetalol Injectable 10 milliGRAM(s) IV Push every 2 hours PRN  levothyroxine 112 MICROGram(s) Oral daily  polyethylene glycol 3350 17 Gram(s) Oral daily  senna 2 Tablet(s) Oral at bedtime  simvastatin 10 milliGRAM(s) Oral at bedtime      PHYSICAL EXAM:   Vital Signs Last 24 Hrs  T(C): 36.8 (01 Jun 2023 07:49), Max: 36.9 (31 May 2023 12:19)  T(F): 98.2 (01 Jun 2023 07:49), Max: 98.4 (31 May 2023 12:19)  HR: 60 (01 Jun 2023 10:00) (60 - 81)  BP: 115/47 (01 Jun 2023 10:00) (102/34 - 145/44)  BP(mean): 64 (01 Jun 2023 10:00) (52 - 103)  RR: 16 (01 Jun 2023 10:00) (16 - 18)  SpO2: 95% (01 Jun 2023 10:00) (95% - 100%)    Parameters below as of 01 Jun 2023 10:00  Patient On (Oxygen Delivery Method): room air    General: NAD, in bed       Mental status: The patient is awake and alert, oriented to self, place, may,  year.  The patient is able to name objects  , follows simple commands, repeat , speech overall fluent. No neglect.     Cranial nerves: Dysarthria, mild-moderate left facial palsy,  VF full with prompting- does break fixation but attends readily to hand movement , no right visual neglect appreciated . Extraocular motion is full with no nystagmus. There is no ptosis. Tongue midline.     Motor: There is normal bulk and tone.  There is no tremor.  Strength is 5/5 in the right arm and  4/5 leg.   Strength is 4+/5 in the left arm and 4/5 leg. Drift in arm/leg but does not hit bed.     Sensation: Intact to light touch and pin in 4 extremities, left sensory extinction resolved     Cerebellar: There is no dysmetria on finger to nose testing.    Gait : deferred    LABS:                        7.6    9.85  )-----------( 182      ( 01 Jun 2023 07:18 )             23.5    06-01    142  |  106  |  17.5  ----------------------------<  97  3.8   |  26.0  |  0.56    Ca    8.6      01 Jun 2023 07:18  Phos  2.6     05-31  Mg     2.0     05-31    TPro  5.9<L>  /  Alb  3.0<L>  /  TBili  0.4  /  DBili  x   /  AST  18  /  ALT  17  /  AlkPhos  66  05-31        IMAGING: Reviewed by me.   see [facility] ([facility] by [facility])     CT Head No Cont (05.30.23 @ 00:56)   IMPRESSION:  1. Right anterior MCA acute infarction is suspected  2. Right PCA distribution remote infarction  3. Ischemic white matter disease and atrophy typical for age  4. En plaque meningioma right anterior cranial fossa floor  5. Intracranial atherosclerosis       TTE Echo Complete w/ Contrast w/ Doppler (05.29.23 @ 12:49)   Summary:   1. Left ventricular ejection fraction, by visual estimation, is 60 to   65%.   2. Normal global left ventricular systolic function.   3. The mitral in-flow pattern reveals no discernable A-wave, therefore   no comment on diastolic function can be made.   4. There is mild concentric left ventricular hypertrophy.   5. Normal right ventricular sizeand function.   6. Moderate to severe left atrial enlargement.   7. Moderately enlarged right atrium.   8. Mild mitral valve regurgitation.   9. Sclerotic aortic valve with normal opening.  10. Mild aortic regurgitation.  11. Mild-moderate tricuspid regurgitation.  12. Estimated pulmonary artery systolic pressure is 40.2 mmHg assuming a   right atrial pressure of 10 mmHg, which is consistent with mild pulmonary   hypertension.  13. There is no evidence of pericardial effusion.     TTE Echo Limited or F/U (05.30.23 @ 15:15)   Summary:   1. Limited follow up agitated saline study.   2. Normal global left ventricular systolic function.   3. Left ventricular ejection fraction, by visual estimation, is 60 to   65%.   4. Color flow doppler and intravenous injection of agitated saline   demonstrates the presence of an intact intra atrial septum.    CT Head No Cont (05.31.23 @ 17:20)     The ventricular and sulcal size and configuration is age appropriate.      There are moderate patchy and confluent areas of hypodensity in the   periventricular and subcortical white matter which are likely related to   chronic microangiopathic changes. There is more conspicuous loss of   gray-white differentiation involving the right caudate, right basal   ganglia and anterior right insula consistent with evolving infarction. No   interval hemorrhagic transformation. There is redemonstrated right   occipital chronic infarction.    There is no evidence of mass effect, midline shift, acute intracranial   hemorrhage, or extra-axial collections.   The calvarium is intact. The paranasal sinuses are clear.The mastoid air   cells are predominantly clear. There has been prior bilateral cataract   surgery.      Evolving acute infarction as described above. Nointerval hemorrhagic   transformation. Chronic microvascular ischemic changes and chronic right   occipital infarction.

## 2023-06-01 NOTE — SWALLOW VFSS/MBS ASSESSMENT ADULT - SUCCESSFUL STRATEGIES TRIALED DURING PROCEDURE
chin tuck to help clear stasis/chin tuck to help clear stasis/chin tuck/productive volitional cough following clinician cue

## 2023-06-01 NOTE — PROGRESS NOTE ADULT - SUBJECTIVE AND OBJECTIVE BOX
Patient is motivated to get out and go to rehab.  Looking for stuff to do and feels bored.     FUNCTIONAL PROGRESS  5/31 SLP  Speech Language Pathology Recommendations: 1. Regular diet, MILDLY thick liquids2. 1:1 assist for PO3. Strict aspiration precautions 4. Upright for PO, slow rate, small bites/sips, alternate solids/liquids, oral care5. Oral care  6. Will follow to re-assess swallow & for speech therapy to improve mild dysarthria and ongoing assessment of cognitive linguistic skills     5/30 SLP   Clinical Impression and Recommendations:   · Diagnosis	Pt's receptive language skills assessed to be WFL marked by ability to follow simple and complex commands, answer complex yes/no questions, and ID objects/pictures F:4. Pt presents w/ mild expressive language deficits characterized by slight word finding difficulties in structured fluency tasks however, question baseline versus true expressive language deficits from CVA. Pt able to express all wants/needs, participate in conversation, & complete automatic speech tasks, sentence formation and confrontation naming tasks w/ 100% accuracy independently.  · Criteria for Skilled Therapeutic Interventions Met	skilled criteria for intervention met  · Therapy Frequency	as schedule permits    Behavioral Exam:   · Behavioral Observations	within functional limits  · Visual Perception and Processing	intact    Auditory Comprehension:   · Able to Identify Objects	within functional limits  · field of 4 or more	yes  100%  · field of 4 or more	yes  100%  · Answers Yes/No Questions	within functional limits  · complex	yes  100%  · 3-step	yes  100%  · Discourse	intact  · Right/Left Discrimination	intact  · Body Part ID	intact  · Open Ended Questions	intact    Verbal Expression:   · Automatic Speech	intact; days of the week; months of the year  · Confrontational Naming	intact  100%  · Repetition	intact  · Fluency	impaired, reduced organization of task, +word finding deficits, 3WPM.  · Conversational Speech	WFL    Cognitive Linguistic Status Examination:   · Attention	intact  · Short Term Memory	impaired  Pt reports difficulty w/ short term memory, noted within structured STM tasks & informally as pt w/ repetitions of questions (i.e: what time is my medicine due)  · Long Term Memory	intact  for biographical information  · Problem Solving	intact  100%  · Reasoning	intact  100%  · Organization	informally assessed to be mildly reduced  · Executive Functions	intact  · Executive Function Deficits Noted	mental flexibility    Motor Speech:   · Fluency	impaired  · Alternating Rapid Sounds	impaired  · Observations	slurred speech  · Articulation	imprecise  · Prosody	intact  · Rate	intact    Voice:   · Voice	WFL    5/30 PT  Bed Mobility Analysis:     · Bed Mobility Limitations	Pt OOB in chair    Transfer: Sit to Stand:     · Level of [place]	minimum assist (75% patients effort)  · Physical Assist/Nonphysical Assist	2 person assist  · Weight-Bearing Restrictions	weight-bearing as tolerated  · Assistive Device	rolling walker    Transfer: Stand to Sit:     · Level of [place]	minimum assist (75% patients effort)  · Physical Assist/Nonphysical Assist	2 person assist  · Weight-Bearing Restrictions	weight-bearing as tolerated  · Assistive Device	rolling walker    Sit/Stand Transfer Safety Analysis:     · Transfer Safety Concerns Noted	decreased safety awareness; decreased proprioception; decreased sequencing ability; decreased weight-shifting ability; difficulty gripping and maintaining Left hand on RW, + left/posterior lean throughout.  · Impairments Contributing to Impaired Transfers	impaired balance; impaired postural control; decreased strength; impaired motor control; impaired coordination    Gait Skills:     · Level of [place]	moderate assist (50% patients effort)  · Physical Assist/Nonphysical Assist	1 person + 1 person to manage equipment; 2nd assist for line management  · Weight-Bearing Restrictions	weight-bearing as tolerated  · Assistive Device	rolling walker  · Gait Distance	3 feet    5/30 OT  Bathing Training:   · Level of [place]	moderate assist (50% patients effort); maximum assist (25% patients effort)    Upper Body Dressing Training:   · Level of [place]	moderate assist (50% patients effort)    Lower Body Dressing Training:   · Level of [place]	maximum assist (25% patients effort)    Toilet Hygiene Training:   · Level of [place]	to assess    Grooming Training:   · Level of [place]	moderate assist (50% patients effort)    Eating/Self-Feeding Training:   · Level of [place]	Did not directly observe        VITALS  T(C): 36.8 (06-01-23 @ 07:49), Max: 36.9 (05-31-23 @ 12:19)  HR: 69 (06-01-23 @ 08:00) (62 - 81)  BP: 145/44 (06-01-23 @ 08:00) (102/34 - 145/44)  RR: 16 (06-01-23 @ 08:00) (16 - 21)  SpO2: 99% (06-01-23 @ 08:00) (95% - 100%)  Wt(kg): --    MEDICATIONS   albuterol    0.083% 2.5 milliGRAM(s) every 6 hours PRN  aspirin  chewable 81 milliGRAM(s) daily  budesonide 160 MICROgram(s)/formoterol 4.5 MICROgram(s) Inhaler 2 Puff(s) two times a day  cefTRIAXone Injectable.     cefTRIAXone Injectable. 1000 milliGRAM(s) every 24 hours  chlorhexidine 2% Cloths 1 Application(s) daily  digoxin     Tablet 125 MICROGram(s) daily  enoxaparin Injectable 40 milliGRAM(s) every 24 hours  famotidine    Tablet 20 milliGRAM(s) daily  hydrALAZINE Injectable 10 milliGRAM(s) every 2 hours PRN  iron sucrose Injectable 200 milliGRAM(s) every 24 hours  labetalol Injectable 10 milliGRAM(s) every 2 hours PRN  levothyroxine 112 MICROGram(s) daily  polyethylene glycol 3350 17 Gram(s) daily  senna 2 Tablet(s) at bedtime  simvastatin 10 milliGRAM(s) at bedtime      RECENT LABS/IMAGING  - Reviewed Today                        7.6    9.85  )-----------( 182      ( 01 Jun 2023 07:18 )             23.5     06-01    142  |  106  |  17.5  ----------------------------<  97  3.8   |  26.0  |  0.56    Ca    8.6      01 Jun 2023 07:18  Phos  2.6     05-31  Mg     2.0     05-31    TPro  5.9<L>  /  Alb  3.0<L>  /  TBili  0.4  /  DBili  x   /  AST  18  /  ALT  17  /  AlkPhos  66  05-31              BLE V DOPPLERS 5/29 - No evidence of deep venous thrombosis in either lower extremity.    HEAD CT 5/30 - 1. Right anterior MCA acute infarction is suspected 2. Right PCA distribution remote infarction 3. Ischemic white matter disease and atrophy typical for age 4. En plaque meningioma right anterior cranial fossa floor 5. Intracranial atherosclerosis      HEAD CT 5/31 - Evolving acute infarction as described above. No interval hemorrhagic transformation. Chronic microvascular ischemic changes and chronic right occipital infarction.  -------------------------------------------------------------------------------------------------  PHYSICAL EXAM  Constitutional - NAD, Comfortable  Extremities - No edema  Neurologic Exam -                    Cognitive - AAOx4     Communication - Expressive deficits, Delayed processing, +Dysarthria     Cranial Nerves - Left lip droop      Motor -  Left hemiparesis, Impaired fine motor/coordination      Sensory - Intact to LT  Psychiatric - Mildly anxious  ----------------------------------------------------------------------------------------  ASSESSMENT/PLAN  89yFemale with functional deficits after an acute CVA  Acute right M1 occlusion s/p TNK & thrombectomy - ASA, Zocor  AFIB - Digoxin  HTN- Hydralazine, Labetalol  Anemia - Iron, Venofer  Pulm - Albuterol, Symbicort  UTI - Rocephin   Pain - Tylenol  DVT PPX - SCDs, Lovenox  Rehab/Impaired mobility and function - Patient continues to require hospitalization for the above diagnoses and ongoing active management of comorbid complications (IV HTN medications) that are substantially impairing functional ability and impairing quality of life necessitating acute rehab.    Based on the patient's diagnosis, functional status and potential for progress, continue to recommend ACUTE inpatient rehabilitation for the functional deficits consisting of 3 hours of therapy/day & 24 hour RN/daily PMR physician for comorbid medical management. Patient will be able to tolerate 3 hours a day.    Will continue to follow. Rehab recommendations are dependent on how functional progress changes as well as how patient continues to participate and tolerate therapeutic interventions, which may change. Recommend ongoing mobilization by staff to maintain cardiopulmonary function and prevention of secondary complications related to debility. Discussed the specific management and recommendations above with rehab clinical care team/rehab liaison.

## 2023-06-01 NOTE — SWALLOW VFSS/MBS ASSESSMENT ADULT - ORAL PHASE
within functional limits/Uncontrolled bolus / spillover in elke-pharynx Uncontrolled bolus / spillover in hypopharynx Within functional limits/Uncontrolled bolus / spillover in elke-pharynx within functional limits/Uncontrolled bolus / spillover in hypopharynx

## 2023-06-01 NOTE — PROGRESS NOTE ADULT - ASSESSMENT
ASSESSMENT: 89F with PMH afib recently taken off AC about 1 week post fall noted s/p fall, PPM, who presented with Right  gaze deviation and Left sided weakness. Last known well 1900 5/28/23, was found by  at 2100 that day with symptoms. [place] [facility] [place], NIH 26, found to have Right M1 and basilar apex occlusion on CT Angiogram, post tenecteplase. Subsequently underwent mechanical thrombectomy with TICI 3 recanalization.   Basilar thrombus appeared to embolize to on angiogram post tenecteplase to left P1 origin, left PCA filling via left PComm. Additional chronic right occipital infarction. Etiology likely cardioembolic in setting of atrial fibrillation.    NEURO: neurologically overall improving post thrombectomy, right visual neglect appears improved now along with left sensory neglect, she reports she wears glasses unclear if contributory, Continue close monitoring for neurologic deterioration , stroke neuro checks  q 2 ,  SBP goal post thrombectomy 110-140mmHg , titrate statin to LDL goal less than 70, Suggest MRI when cleared from PPM standpoint, CT head repeated without acute change- noted evolving infarction, Physical therapy/OT/Speech eval/treatment.     ANTITHROMBOTIC THERAPY: ASA based on cardiology/medical indication.  Patient has a history of atrial fibrillation now with cardioembolic event. If no absolute contraindication or current significant risk of hemorrhage as overall benefit> risks per d/w Son  then suggest to resume Apixaban 2.5mg BID.  Patient needs extensive monitoring and support for all ADL to avoid and minimize fall risk.  At the request of the patient was d/w son who is a physician, Son amenable and noted more concerned with thromboembolic risk for stroke as opposed to bleeding risk as notes has had recent GI workup.   Associated risk of hemorrhage but at this time from neurological standpoint overall benefit>risk for Anticoagulation. FALL PRECAUTIONS.     PULMONARY: protecting airway, saturating well     CARDIOVASCULAR:  TTE as noted, EF 60-65%, mild concentric LVH, moderate to severe LAE, moderately enlarged RA, mild MVR, mild AR, mild-moderate TR, mild pulmonary htn, cardiac monitoring to ensure rate control.  If not candidate for AC suggest to consider watchman. May discuss DARIUS closure with outpatient cardiologist.     GASTROINTESTINAL:  dysphagia screen deferred to SLP- who suggested on repeat evaluation regular/thin.     RENAL: BUN/Cr elevated but improving, monitor urine output, maintain adequate hydration       Na Goal:  135-145    HEMATOLOGY: H/H with anemia, 182, patient should have all age and risk appropriate malignancy screenings with PCP or sooner if clinically suspected, close monitoring, transfusion as needed consider < 8 in setting of ischemic event recently,  guaiac , s/p venofer infusion  screen for source of bleeding, GI/Heme consult.      DVT ppx: LMWH     ID: afebrile,  leukocytosis resolved , + UTI, cx and abx per primary team- adjustment as needed pending sensitivity .     OTHER:  condition and plan of care d/w patient, son, and primary attending . questions and concerns addressed.      DISPOSITION: Rehab or home depending on PT eval once stable and workup is complete      CORE MEASURES:        Admission NIHSS:  26     Tenecteplase : [x] YES [] NO      LDL/HDL/A1C: 26/27/5.7     Depression Screen- if depression hx and/or present      Statin Therapy: as noted      Dysphagia Screen: [] PASS [] FAIL  SLP      Smoking [] YES [x] NO      Afib [x] YES [] NO     Stroke Education [x] YES [] NO    Obtain screening lower extremity venous ultrasound in patients who meet 1 or more of the following criteria as patient is high risk for DVT/PE on admission:   [] History of DVT/PE  []Hypercoagulable states (Factor V Leiden, Cancer, OCP, etc. )  []Prolonged immobility (hemiplegia/hemiparesis/post operative or any other extended immobilization)  [] Transferred from outside facility (Rehab or Long term care)  [] Age </= to 50    ASSESSMENT: 89F with PMH afib recently taken off AC about 1 week post fall noted s/p fall, PPM, who presented with Right  gaze deviation and Left sided weakness. Last known well 1900 5/28/23, was found by  at 2100 that day with symptoms. Patient taken to [facility] [place], code stroke initiated, NIH 26, found to have Right M1 and basilar apex occlusion on CT Angiogram, post tenecteplase. Subsequently underwent mechanical thrombectomy with TICI 3 recanalization of RMCA.   Basilar thrombus migrated post tenecteplase to left P1 origin, left PCA filling via left PComm. Additional chronic right occipital infarction. Etiology likely cardioembolic in setting of atrial fibrillation.    NEURO: neurologically overall improving post thrombectomy, right visual and left sensory neglect appears resolved today, she reports she wears glasses unclear if contributory, Continue close monitoring for neurologic deterioration , stroke neuro checks  q 2 ,  SBP goal post thrombectomy 110-140mmHg , titrate statin to LDL goal less than 70, Suggest MRI when cleared from PPM standpoint, CT head repeated without acute change- noted evolving infarction, Physical therapy/OT/Speech eval/treatment.     ANTITHROMBOTIC THERAPY: ASA as per cardiology/medical indication, would avoid if possible given significant anemia and need for anticoagulation.  Patient has a history of atrial fibrillation now with cardioembolic event. If no absolute contraindication or current significant risk of hemorrhage as overall benefit> risks per d/w Son  then resuming her home dose of Apixaban 2.5mg BID is reasonable.  Patient needs monitoring and support for all ADL to avoid and minimize fall risk.  At the request of the patient, assessment and plan were d/w son who is a physician. Son is very concerned with recurrent thromboembolic risk for stroke as opposed to bleeding risk as notes has had recent GI workup and would rather have her on Eliquis now with the knowledge of her slowly decreaseing hemoglobin. He understands the associated risk of hemorrhage but at this time from neurological standpoint overall benefit may outweigh risk of further anemia with anticoagulation. FALL PRECAUTIONS.     PULMONARY: protecting airway, saturating well     CARDIOVASCULAR:  TTE as noted, EF 60-65%, mild concentric LVH, moderate to severe LAE, moderately enlarged RA, mild MVR, mild AR, mild-moderate TR, mild pulmonary htn, cardiac monitoring to ensure rate control.  If not candidate for AC suggest to consider watchman. May discuss DARIUS closure with outpatient cardiologist.     GASTROINTESTINAL:  dysphagia screen deferred to SLP- who suggested on repeat evaluation regular/thin.     RENAL: BUN/Cr elevated but improving, monitor urine output, maintain adequate hydration       Na Goal:  135-145    HEMATOLOGY: H/H with anemia, 182, patient should have all age and risk appropriate malignancy screenings with PCP or sooner if clinically suspected, close monitoring, transfusion as needed consider < 8 in setting of ischemic event recently,  guaiac , s/p venofer infusion  screen for source of bleeding, GI/Heme consult.      DVT ppx: LMWH     ID: afebrile,  leukocytosis resolved , + UTI, cx and abx per primary team- adjustment as needed pending sensitivity .     OTHER:  condition and plan of care d/w patient, son, and primary attending . questions and concerns addressed.      DISPOSITION: Rehab or home depending on PT eval once stable and workup is complete      CORE MEASURES:        Admission NIHSS:  26     Tenecteplase : [x] YES [] NO      LDL/HDL/A1C: 26/27/5.7     Depression Screen- if depression hx and/or present      Statin Therapy: as noted      Dysphagia Screen: [] PASS [] FAIL  SLP      Smoking [] YES [x] NO      Afib [x] YES [] NO     Stroke Education [x] YES [] NO    Obtain screening lower extremity venous ultrasound in patients who meet 1 or more of the following criteria as patient is high risk for DVT/PE on admission:   [] History of DVT/PE  []Hypercoagulable states (Factor V Leiden, Cancer, OCP, etc. )  []Prolonged immobility (hemiplegia/hemiparesis/post operative or any other extended immobilization)  [] Transferred from outside facility (Rehab or Long term care)  [] Age </= to 50

## 2023-06-01 NOTE — SWALLOW VFSS/MBS ASSESSMENT ADULT - UNSUCCESSFUL STRATEGIES TRIALED DURING PROCEDURE
chin tuck/hard swallow/head turn to the right/head turn to the left/productive volitional cough following clinician cue

## 2023-06-01 NOTE — SWALLOW VFSS/MBS ASSESSMENT ADULT - DIAGNOSTIC IMPRESSIONS
Pt presents w/trace-min oral & moderate pharyngeal dysphagia. Oral stage impacted upon by lingual weakness & incoordination, resulting in reduced oral containment w/pre-spill to the hypopharynx w/thins. Less depth in entry w/fall to the valleculae w/mildly thick liquids & foods. Piecemeal deglutition noted throughout. No anterior loss or oral stasis.  Pharyngeal phase of swallow notable for reduced contractility, w/increasing amounts of stasis observed in the valleculae, on the PPW & in the pyriforms, w/consistencies of increased viscosity. Reduction in stasis fair w/cued subsequent dry swallow, though further facilitated w/chin tuck posture & liquid wash. Pt w/reduced hyo-laryngeal elevation/excursion, incomplete epiglottic deflection & inadequate upper/lower airway protection. +Silent aspiration of thin liquids, during & following the swallow, in head neutral position & all compensatory postures. Additional penetration above the level of the vocal cords during & following the swallow on PPW stasis, w/mildly thick liquids in head neutral. Chin tuck posture DOES serve as airway protection w/mildly thick liquids (instance of trace penetration above the level of the cords however WITH complete retrieval w/use of chin tuck posture x 1/4 trials). No airway entry observed w/remaining 75% of trials. No airway invasion w/foods.   Esophageal phase of swallow characterized by intermittent trace retention/retrograde progression of trials below the level of the UES however does not re-occupy pyriforms or affect pharyngeal phase of swallow.

## 2023-06-01 NOTE — PROGRESS NOTE ADULT - ASSESSMENT
89 y/o F w/ PMH of AF (Eliquis stopped 5/23 by cardiologist due to freq falls), HTN, asthma/bronchiectasis, latent TB, hypothroidism, HLD, arthritis, chronic dizziness, macular degeneration. leadless pacemaker for SSS (placed 6/7/22), [place] [facility] [place]UE weakness and found to have a Rt M1 occlusion.  Pt received TNK and transferred to [facility] neuroICU as a code biplane.  She was emergently taken to angio for thrombectomy where TICI 3 revascularization of the Rt M1 was obtained.  Pts neurologic exam has continued to improved.  Pt was noted to have worsening mentation when SBP <110 so she was maintained on светлана x 24 hours and was d/c'd this morning.  Her SBP has since been liberalized and neurologic exam continues to improved.  UA on admission noted to be positive, UCx w/ E.coli, currently on ceftriaxone.  Pt medically stable for transfer to medicine SDU.      Acute CVA   - Likely 2/2 AF off AC   - s/p TNK and Rt M1 thrombectomy 05/29  - Clinically improving   - Neurochecks q2h  - Neurology recs appreciated  - Continue Aspirin 81mg daily  - Continue Zocor 10mg nightly   - MRI Brain scheduled for 8:30 AM 6/2     AF   - Rate controlled   - Digoxin 125mcg daily  - EP recs appreciated  - Cardiology recs appreciated  - Restarted on Eliquis    Normocytic anemia   - Hemodynamically stable   - No active bleeding   - Monitor H/H and transfuse as needed    Uncomplicated UTI  - UA w/ pyuria and Ucx growing E.coli   - Completed course of Ceftriaxone on 5/31  - Leukocytosis likely from UTI and reactive s/p intervention     Hypothyroidism  - Synthroid 112mcg daily    DVT ppx  - Eliquis

## 2023-06-01 NOTE — SWALLOW VFSS/MBS ASSESSMENT ADULT - SLP PERTINENT HISTORY OF CURRENT PROBLEM
As per MD note, " 89F with PMH afib recently taken off AC about 1 week post fall noted s/p fall, PPM, who presented with Right  gaze deviation and Left sided weakness. Last known well 1900 5/28/23, was found by  at 2100 that day with symptoms. [place] [facility] [place], NIH 26, found to have Right M1 and basilar apex occlusion on CT Angiogram, post tenecteplase. Subsequently underwent mechanical thrombectomy with TICI 3 recanalization.   Basilar thrombus appeared to embolize to on angiogram post tenecteplase to left P1 origin, left PCA filling via left PComm. Additional chronic right occipital infarction. Etiology likely cardioembolic in setting of atrial fibrillation".

## 2023-06-01 NOTE — SWALLOW VFSS/MBS ASSESSMENT ADULT - SLP GENERAL OBSERVATIONS
Recd awake/upright in stretcher in radiology, w/nsg Susan, on monitor, tolerating RA SpO2 98%, +[place], A&A Ox3, reduced cog, 0/10 pain pre/post

## 2023-06-01 NOTE — SWALLOW VFSS/MBS ASSESSMENT ADULT - ROSENBEK'S PENETRATION ASPIRATION SCALE
observed during & following swallow on stasis head neutral;2- penetration above cords with complete retrieval 25% of trials w/chin tuck  1- no penetration or aspiration w/chin tuck 75% of trials/(3) contrast remains above the vocal cords, visible residue remains (penetration) (1) no aspiration, contrast does not enter airway (8) contrast passes glottis, visible subglottic residue remains, absent patient response (aspiration)

## 2023-06-01 NOTE — PROGRESS NOTE ADULT - SUBJECTIVE AND OBJECTIVE BOX
Chief complaint: Stroke    Patient seen and examined at bedside. No acute overnight events reported. No fever, chills, cough, nausea or vomiting.     Vital Signs Last 24 Hrs  T(F): 98.2 (01 Jun 2023 07:49), Max: 98.4 (01 Jun 2023 00:06)  HR: 65 (01 Jun 2023 12:00) (60 - 81)  BP: 137/38 (01 Jun 2023 12:00) (102/34 - 145/44)  RR: 16 (01 Jun 2023 12:00) (16 - 18)  SpO2: 98% (01 Jun 2023 12:00) (95% - 100%)    Physical Exam:  Constitutional: alert and oriented, in no acute distress   Neck: Soft and supple  Respiratory: Clear to auscultation bilaterally  Cardiovascular: Irregular rhythm  Gastrointestinal: Soft, non-tender to palpation, +bs  Vascular: 2+ peripheral pulses  Neurological: A/O x 3  Musculoskeletal: no lower extremity edema bilaterally    Labs:                        7.6    9.85  )-----------( 182      ( 01 Jun 2023 07:18 )             23.5   06-01    142  |  106  |  17.5  ----------------------------<  97  3.8   |  26.0  |  0.56    Ca    8.6      01 Jun 2023 07:18  Phos  2.6     05-31  Mg     2.0     05-31    TPro  5.9<L>  /  Alb  3.0<L>  /  TBili  0.4  /  DBili  x   /  AST  18  /  ALT  17  /  AlkPhos  66  05-31

## 2023-06-02 ENCOUNTER — TRANSCRIPTION ENCOUNTER (OUTPATIENT)
Age: 88
End: 2023-06-02

## 2023-06-02 LAB
ANION GAP SERPL CALC-SCNC: 10 MMOL/L — SIGNIFICANT CHANGE UP (ref 5–17)
BASOPHILS # BLD AUTO: 0.04 K/UL — SIGNIFICANT CHANGE UP (ref 0–0.2)
BASOPHILS NFR BLD AUTO: 0.4 % — SIGNIFICANT CHANGE UP (ref 0–2)
BUN SERPL-MCNC: 15.7 MG/DL — SIGNIFICANT CHANGE UP (ref 8–20)
CALCIUM SERPL-MCNC: 8.9 MG/DL — SIGNIFICANT CHANGE UP (ref 8.4–10.5)
CHLORIDE SERPL-SCNC: 104 MMOL/L — SIGNIFICANT CHANGE UP (ref 96–108)
CO2 SERPL-SCNC: 26 MMOL/L — SIGNIFICANT CHANGE UP (ref 22–29)
CREAT SERPL-MCNC: 0.49 MG/DL — LOW (ref 0.5–1.3)
EGFR: 89 ML/MIN/1.73M2 — SIGNIFICANT CHANGE UP
EOSINOPHIL # BLD AUTO: 0.36 K/UL — SIGNIFICANT CHANGE UP (ref 0–0.5)
EOSINOPHIL NFR BLD AUTO: 3.7 % — SIGNIFICANT CHANGE UP (ref 0–6)
GLUCOSE SERPL-MCNC: 99 MG/DL — SIGNIFICANT CHANGE UP (ref 70–99)
HCT VFR BLD CALC: 24.8 % — LOW (ref 34.5–45)
HGB BLD-MCNC: 8.1 G/DL — LOW (ref 11.5–15.5)
IMM GRANULOCYTES NFR BLD AUTO: 2.7 % — HIGH (ref 0–0.9)
LYMPHOCYTES # BLD AUTO: 1.41 K/UL — SIGNIFICANT CHANGE UP (ref 1–3.3)
LYMPHOCYTES # BLD AUTO: 14.4 % — SIGNIFICANT CHANGE UP (ref 13–44)
MCHC RBC-ENTMCNC: 26.6 PG — LOW (ref 27–34)
MCHC RBC-ENTMCNC: 32.7 GM/DL — SIGNIFICANT CHANGE UP (ref 32–36)
MCV RBC AUTO: 81.3 FL — SIGNIFICANT CHANGE UP (ref 80–100)
MONOCYTES # BLD AUTO: 0.75 K/UL — SIGNIFICANT CHANGE UP (ref 0–0.9)
MONOCYTES NFR BLD AUTO: 7.6 % — SIGNIFICANT CHANGE UP (ref 2–14)
NEUTROPHILS # BLD AUTO: 6.99 K/UL — SIGNIFICANT CHANGE UP (ref 1.8–7.4)
NEUTROPHILS NFR BLD AUTO: 71.2 % — SIGNIFICANT CHANGE UP (ref 43–77)
NRBC # BLD: 1 /100 WBCS — HIGH (ref 0–0)
PLATELET # BLD AUTO: 212 K/UL — SIGNIFICANT CHANGE UP (ref 150–400)
POTASSIUM SERPL-MCNC: 4 MMOL/L — SIGNIFICANT CHANGE UP (ref 3.5–5.3)
POTASSIUM SERPL-SCNC: 4 MMOL/L — SIGNIFICANT CHANGE UP (ref 3.5–5.3)
RBC # BLD: 3.05 M/UL — LOW (ref 3.8–5.2)
RBC # FLD: 15.4 % — HIGH (ref 10.3–14.5)
SODIUM SERPL-SCNC: 140 MMOL/L — SIGNIFICANT CHANGE UP (ref 135–145)
WBC # BLD: 9.81 K/UL — SIGNIFICANT CHANGE UP (ref 3.8–10.5)
WBC # FLD AUTO: 9.81 K/UL — SIGNIFICANT CHANGE UP (ref 3.8–10.5)

## 2023-06-02 PROCEDURE — 99233 SBSQ HOSP IP/OBS HIGH 50: CPT | Mod: FS

## 2023-06-02 PROCEDURE — 99233 SBSQ HOSP IP/OBS HIGH 50: CPT | Mod: GC

## 2023-06-02 PROCEDURE — 99232 SBSQ HOSP IP/OBS MODERATE 35: CPT

## 2023-06-02 PROCEDURE — 70551 MRI BRAIN STEM W/O DYE: CPT | Mod: 26

## 2023-06-02 RX ADMIN — POLYETHYLENE GLYCOL 3350 17 GRAM(S): 17 POWDER, FOR SOLUTION ORAL at 11:18

## 2023-06-02 RX ADMIN — SIMVASTATIN 10 MILLIGRAM(S): 20 TABLET, FILM COATED ORAL at 20:55

## 2023-06-02 RX ADMIN — SENNA PLUS 2 TABLET(S): 8.6 TABLET ORAL at 20:55

## 2023-06-02 RX ADMIN — Medication 112 MICROGRAM(S): at 05:03

## 2023-06-02 RX ADMIN — Medication 81 MILLIGRAM(S): at 11:18

## 2023-06-02 RX ADMIN — CHLORHEXIDINE GLUCONATE 1 APPLICATION(S): 213 SOLUTION TOPICAL at 05:06

## 2023-06-02 RX ADMIN — Medication 325 MILLIGRAM(S): at 11:18

## 2023-06-02 RX ADMIN — FAMOTIDINE 20 MILLIGRAM(S): 10 INJECTION INTRAVENOUS at 11:18

## 2023-06-02 RX ADMIN — APIXABAN 2.5 MILLIGRAM(S): 2.5 TABLET, FILM COATED ORAL at 17:38

## 2023-06-02 RX ADMIN — BUDESONIDE AND FORMOTEROL FUMARATE DIHYDRATE 2 PUFF(S): 160; 4.5 AEROSOL RESPIRATORY (INHALATION) at 20:56

## 2023-06-02 RX ADMIN — APIXABAN 2.5 MILLIGRAM(S): 2.5 TABLET, FILM COATED ORAL at 05:03

## 2023-06-02 RX ADMIN — Medication 125 MICROGRAM(S): at 05:04

## 2023-06-02 NOTE — PROGRESS NOTE ADULT - ASSESSMENT
INCOMPLETE  ASSESSMENT: 89F with PMH afib recently taken off AC about 1 week post fall noted s/p fall, PPM, who presented with Right  gaze deviation and Left sided weakness. Last known well 1900 5/28/23, was found by  at 2100 that day with symptoms. Patient taken to [facility] [place], code stroke initiated, NIH 26, found to have Right M1 and basilar apex occlusion on CT Angiogram, post tenecteplase. Subsequently underwent mechanical thrombectomy with TICI 3 recanalization of RMCA.   Basilar thrombus migrated post tenecteplase to left P1 origin, left PCA filling via left PComm. Additional chronic right occipital infarction. Etiology likely cardioembolic in setting of atrial fibrillation.    NEURO: neurologically overall improving post thrombectomy, right visual and left sensory neglect appears resolved today, she reports she wears glasses unclear if contributory, Continue close monitoring for neurologic deterioration , stroke neuro checks  q 2 ,  SBP goal post thrombectomy 110-140mmHg , titrate statin to LDL goal less than 70, MRI cleared from PPM standpoint and pending results, CT head repeated without acute change- noted evolving infarction, Physical therapy/OT/Speech eval/treatment.     ANTITHROMBOTIC THERAPY: ASA as per cardiology/medical indication, would avoid if possible given significant anemia and need for anticoagulation.  Patient has a history of atrial fibrillation now with cardioembolic event. If no absolute contraindication or current significant risk of hemorrhage as overall benefit> risks per d/w Son  then resuming her home dose of Apixaban 2.5mg BID is reasonable.  Patient needs monitoring and support for all ADL to avoid and minimize fall risk.  At the request of the patient, assessment and plan were d/w son who is a physician. Son is very concerned with recurrent thromboembolic risk for stroke as opposed to bleeding risk as notes has had recent GI workup and would rather have her on Eliquis now with the knowledge of her slowly decreasing hemoglobin. He understands the associated risk of hemorrhage but at this time from neurological standpoint overall benefit may outweigh risk of further anemia with anticoagulation. FALL PRECAUTIONS.     PULMONARY: protecting airway, saturating well     CARDIOVASCULAR:  TTE as noted, EF 60-65%, mild concentric LVH, moderate to severe LAE, moderately enlarged RA, mild MVR, mild AR, mild-moderate TR, mild pulmonary htn, cardiac monitoring to ensure rate control.  If not candidate for AC suggest to consider watchman. May discuss DARIUS closure with outpatient cardiologist.     GASTROINTESTINAL:  dysphagia screen deferred to SLP- who suggested on repeat evaluation regular/thin.     RENAL: BUN/Cr15.7/0.49 monitor urine output, maintain adequate hydration       Na Goal:  135-145    HEMATOLOGY: H/H with anemia 8.1/24.8, Platelets 212, patient should have all age and risk appropriate malignancy screenings with PCP or sooner if clinically suspected, close monitoring, transfusion as needed consider < 8 in setting of ischemic event recently,  guaiac , s/p venofer infusion  screen for source of bleeding, GI/Heme consult.      DVT ppx: Apixaban     ID: afebrile,  leukocytosis resolved , + UTI, cx and abx per primary team- adjustment as needed pending sensitivity .     OTHER:  condition and plan of care d/w patient, son, and primary attending . questions and concerns addressed.      DISPOSITION: Rehab or home depending on PT eval once stable and workup is complete      CORE MEASURES:        Admission NIHSS:  26     Tenecteplase : [x] YES [] NO      LDL/HDL/A1C: 26/27/5.7     Depression Screen- if depression hx and/or present      Statin Therapy: as noted      Dysphagia Screen: [] PASS [] FAIL  SLP      Smoking [] YES [x] NO      Afib [x] YES [] NO     Stroke Education [x] YES [] NO    Obtain screening lower extremity venous ultrasound in patients who meet 1 or more of the following criteria as patient is high risk for DVT/PE on admission:   [] History of DVT/PE  []Hypercoagulable states (Factor V Leiden, Cancer, OCP, etc. )  []Prolonged immobility (hemiplegia/hemiparesis/post operative or any other extended immobilization)  [] Transferred from outside facility (Rehab or Long term care)  [] Age </= to 50   ASSESSMENT: 89F with PMH afib recently taken off AC about 1 week post fall noted s/p fall, PPM, who presented with Right  gaze deviation and Left sided weakness. Last known well 1900 5/28/23, was found by  at 2100 that day with symptoms. Patient taken to [facility] [place], code stroke initiated, NIH 26, found to have Right M1 and basilar apex occlusion on CT Angiogram, post tenecteplase. Subsequently underwent mechanical thrombectomy with TICI 3 recanalization of RMCA.   Basilar thrombus migrated post tenecteplase to left P1 origin, left PCA filling via left PComm. Additional chronic right occipital infarction. Etiology likely cardioembolic in setting of atrial fibrillation.    NEURO: neurologically overall improving post thrombectomy, Significant improvement in cognition and left upper and lower extremity strength,  right visual and left sensory neglect appears resolved on 6/1/23, she reports she wears glasses unclear if contributory, Continue close monitoring for neurologic deterioration , stroke neuro checks  q 2 ,  SBP goal post thrombectomy 110-140mmHg , titrate statin to LDL goal less than 70, MRI cleared from PPM standpoint and pending results, CT head repeated without acute change- noted evolving infarction, Physical therapy/OT/Speech eval/treatment.     ANTITHROMBOTIC THERAPY: ASA as per cardiology/medical indication, would avoid if possible given significant anemia and need for anticoagulation.  Patient has a history of atrial fibrillation now with cardioembolic event. If no absolute contraindication or current significant risk of hemorrhage as overall benefit> risks per d/w Son  then resuming her home dose of Apixaban 2.5mg BID is reasonable.  Patient needs monitoring and support for all ADL to avoid and minimize fall risk.  At the request of the patient, assessment and plan were d/w son who is a physician. Son is very concerned with recurrent thromboembolic risk for stroke as opposed to bleeding risk as notes has had recent GI workup and would rather have her on Eliquis now with the knowledge of her slowly decreasing hemoglobin. He understands the associated risk of hemorrhage but at this time from neurological standpoint overall benefit may outweigh risk of further anemia with anticoagulation. FALL PRECAUTIONS.     PULMONARY: protecting airway, saturating well     CARDIOVASCULAR:  TTE as noted, EF 60-65%, mild concentric LVH, moderate to severe LAE, moderately enlarged RA, mild MVR, mild AR, mild-moderate TR, mild pulmonary htn, cardiac monitoring to ensure rate control.  If not candidate for AC suggest to consider watchman. May discuss DARIUS closure with outpatient cardiologist.     GASTROINTESTINAL:  dysphagia screen deferred to SLP- who suggested on repeat evaluation regular/thin.     RENAL: BUN/Cr15.7/0.49 monitor urine output, maintain adequate hydration       Na Goal:  135-145    HEMATOLOGY: H/H with anemia 8.1/24.8, Platelets 212, patient should have all age and risk appropriate malignancy screenings with PCP or sooner if clinically suspected, close monitoring, transfusion as needed consider < 8 in setting of ischemic event recently,  guaiac , s/p venofer infusion  screen for source of bleeding, GI/Heme consult.      DVT ppx: Apixaban     ID: afebrile,  leukocytosis resolved , + UTI, cx and abx per primary team- adjustment as needed pending sensitivity .     OTHER:  condition and plan of care d/w patient, son, and primary attending . questions and concerns addressed.      DISPOSITION: Rehab or home depending on PT eval once stable and workup is complete      CORE MEASURES:        Admission NIHSS:  26     Tenecteplase : [x] YES [] NO      LDL/HDL/A1C: 26/27/5.7     Depression Screen- if depression hx and/or present      Statin Therapy: as noted      Dysphagia Screen: [] PASS [] FAIL  SLP      Smoking [] YES [x] NO      Afib [x] YES [] NO     Stroke Education [x] YES [] NO    Obtain screening lower extremity venous ultrasound in patients who meet 1 or more of the following criteria as patient is high risk for DVT/PE on admission:   [] History of DVT/PE  []Hypercoagulable states (Factor V Leiden, Cancer, OCP, etc. )  []Prolonged immobility (hemiplegia/hemiparesis/post operative or any other extended immobilization)  [] Transferred from outside facility (Rehab or Long term care)  [] Age </= to 50   ASSESSMENT: 89F with PMH afib recently taken off AC about 1 week post fall noted s/p fall, PPM, who presented with Right  gaze deviation and Left sided weakness. Last known well 1900 5/28/23, was found by  at 2100 that day with symptoms. [place] [facility] [place], NIH 26, found to have Right M1 and basilar apex occlusion on CT Angiogram, post tenecteplase. Subsequently underwent mechanical thrombectomy with TICI 3 recanalization of RMCA.   Basilar thrombus migrated post tenecteplase to left P1 origin, left PCA filling via left PComm. Additional chronic right occipital infarction. Etiology likely cardioembolic in setting of atrial fibrillation.    NEURO: neurologically overall improving post thrombectomy, Significant improvement in cognition and left upper and lower extremity strength,  right visual and left sensory neglect appears resolved on 6/1/23, she reports she wears glasses unclear if contributory, Continue close monitoring for neurologic deterioration , stroke neuro checks  q 2 ,  SBP goal post thrombectomy 110-140mmHg , titrate statin to LDL goal less than 70, MRI cleared from PPM standpoint and pending results, CT head repeated without acute change- noted evolving infarction, Physical therapy/OT/Speech eval/treatment.     ANTITHROMBOTIC THERAPY: ASA as per cardiology/medical indication, would avoid if possible given significant anemia and need for anticoagulation.  Patient has a history of atrial fibrillation now with cardioembolic event. If no absolute contraindication or current significant risk of hemorrhage as overall benefit> risks per d/w Son  then resuming her home dose of Apixaban 2.5mg BID is reasonable.  Patient needs monitoring and support for all ADL to avoid and minimize fall risk.  At the request of the patient, assessment and plan were d/w son who is a physician. Son is very concerned with recurrent thromboembolic risk for stroke as opposed to bleeding risk as notes has had recent GI workup and would rather have her on Eliquis now with the knowledge of her slowly decreasing hemoglobin. He understands the associated risk of hemorrhage but at this time from neurological standpoint overall benefit may outweigh risk of further anemia with anticoagulation. FALL PRECAUTIONS.     PULMONARY: protecting airway, saturating well     CARDIOVASCULAR:  TTE as noted, EF 60-65%, mild concentric LVH, moderate to severe LAE, moderately enlarged RA, mild MVR, mild AR, mild-moderate TR, mild pulmonary htn, cardiac monitoring to ensure rate control.  If not candidate for AC suggest to consider watchman. May discuss DARIUS closure with outpatient cardiologist.     GASTROINTESTINAL:  dysphagia screen deferred to SLP- who suggested on repeat evaluation regular/thin.     RENAL: BUN/Cr15.7/0.49 monitor urine output, maintain adequate hydration       Na Goal:  135-145    HEMATOLOGY: H/H with anemia 8.1/24.8 with improvement, Platelets 212, patient should have all age and risk appropriate malignancy screenings with PCP or sooner if clinically suspected, close monitoring, transfusion as needed consider < 8 in setting of ischemic event recently,  guaiac suggested , s/p venofer infusion  screen for source of bleeding, GI/Heme consult.      DVT ppx: Apixaban     ID: afebrile,  leukocytosis resolved , + UTI, cx and abx per primary team- adjustment as needed pending sensitivity .     OTHER:  condition and plan of care d/w patient, son previously, and primary attending . questions and concerns addressed.      DISPOSITION: Rehab or home depending on PT eval once stable and workup is complete      CORE MEASURES:        Admission NIHSS:  26     Tenecteplase : [x] YES [] NO      LDL/HDL/A1C: 26/27/5.7     Depression Screen- if depression hx and/or present      Statin Therapy: as noted      Dysphagia Screen: [] PASS [] FAIL  SLP      Smoking [] YES [x] NO      Afib [x] YES [] NO     Stroke Education [x] YES [] NO    Obtain screening lower extremity venous ultrasound in patients who meet 1 or more of the following criteria as patient is high risk for DVT/PE on admission:   [] History of DVT/PE  []Hypercoagulable states (Factor V Leiden, Cancer, OCP, etc. )  []Prolonged immobility (hemiplegia/hemiparesis/post operative or any other extended immobilization)  [] Transferred from outside facility (Rehab or Long term care)  [] Age </= to 50 ASSESSMENT: 89F with PMH afib recently taken off AC about 1 week post fall noted s/p fall, PPM, who presented with Right  gaze deviation and Left sided weakness. Last known well 1900 5/28/23, was found by  at 2100 that day with symptoms. Patient taken to [facility] [place], code stroke initiated, NIH 26, found to have Right M1 and basilar apex occlusion on CT Angiogram, post tenecteplase. Subsequently underwent mechanical thrombectomy with TICI 3 recanalization of RMCA.   Basilar thrombus migrated post tenecteplase to left P1 origin, left PCA filling via left PComm. Additional chronic right occipital infarction. Etiology likely cardioembolic in setting of atrial fibrillation.    NEURO: neurologically overall improving post thrombectomy, Significant improvement in cognition and left upper and lower extremity strength,  right visual and left sensory neglect appears resolved on 6/1/23, she reports she wears glasses unclear if contributory, Continue close monitoring for neurologic deterioration , stroke neuro checks  q 2 ,  SBP goal post thrombectomy 110-140mmHg , titrate statin to LDL goal less than 70, MRI cleared from PPM standpoint and pending results, CT head repeated without acute change- noted evolving infarction, Physical therapy/OT/Speech eval/treatment.     ANTITHROMBOTIC THERAPY: ASA as per cardiology/medical indication, would avoid if possible given significant anemia and need for anticoagulation.  Patient has a history of atrial fibrillation now with cardioembolic event. If no absolute contraindication or current significant risk of hemorrhage as overall benefit> risks per d/w Son  then resuming her home dose of Apixaban 2.5mg BID is reasonable.  Patient needs monitoring and support for all ADL to avoid and minimize fall risk.  At the request of the patient, assessment and plan were d/w son who is a physician. Son is very concerned with recurrent thromboembolic risk for stroke as opposed to bleeding risk as notes has had recent GI workup and would rather have her on Eliquis now with the knowledge of her slowly decreasing hemoglobin. He understands the associated risk of hemorrhage but at this time from neurological standpoint overall benefit may outweigh risk of further anemia with anticoagulation. FALL PRECAUTIONS.     PULMONARY: protecting airway, saturating well     CARDIOVASCULAR:  TTE as noted, EF 60-65%, mild concentric LVH, moderate to severe LAE, moderately enlarged RA, mild MVR, mild AR, mild-moderate TR, mild pulmonary htn, cardiac monitoring to ensure rate control.  If not candidate for AC suggest to consider watchman. May discuss DARIUS closure with outpatient cardiologist.     GASTROINTESTINAL:  dysphagia screen deferred to SLP- who suggested on repeat evaluation regular/thin.     RENAL: BUN/Cr15.7/0.49 monitor urine output, maintain adequate hydration       Na Goal:  135-145    HEMATOLOGY: H/H with anemia 8.1/24.8 with improvement, Platelets 212, patient should have all age and risk appropriate malignancy screenings with PCP or sooner if clinically suspected, close monitoring, transfusion as needed consider < 8 in setting of ischemic event recently,  guaiac suggested , s/p venofer infusion  screen for source of bleeding, GI/Heme consult.      DVT ppx: Apixaban     ID: afebrile,  leukocytosis resolved , + UTI, cx and abx per primary team- adjustment as needed pending sensitivity .     OTHER:  condition and plan of care d/w patient, son previously, and primary attending . questions and concerns addressed.      DISPOSITION: Rehab or home depending on PT eval once stable and workup is complete      CORE MEASURES:        Admission NIHSS:  26     Tenecteplase : [x] YES [] NO      LDL/HDL/A1C: 26/27/5.7     Depression Screen- if depression hx and/or present      Statin Therapy: as noted      Dysphagia Screen: [] PASS [] FAIL  SLP      Smoking [] YES [x] NO      Afib [x] YES [] NO     Stroke Education [x] YES [] NO    Obtain screening lower extremity venous ultrasound in patients who meet 1 or more of the following criteria as patient is high risk for DVT/PE on admission:   [] History of DVT/PE  []Hypercoagulable states (Factor V Leiden, Cancer, OCP, etc. )  []Prolonged immobility (hemiplegia/hemiparesis/post operative or any other extended immobilization)  [] Transferred from outside facility (Rehab or Long term care)  [] Age </= to 50

## 2023-06-02 NOTE — DISCHARGE NOTE PROVIDER - NSDCCPCAREPLAN_GEN_ALL_CORE_FT
PRINCIPAL DISCHARGE DIAGNOSIS  Diagnosis: Acute cerebrovascular accident (CVA)  Assessment and Plan of Treatment: Continue medications as prescribed.  Follow up with Neuro in 2 weeks.

## 2023-06-02 NOTE — DISCHARGE NOTE PROVIDER - ATTENDING DISCHARGE PHYSICAL EXAMINATION:
Vital Signs   T(C): 36.1 (06 Jun 2023 12:21), Max: 36.7 (06 Jun 2023 00:24)  T(F): 97 (06 Jun 2023 12:21), Max: 98 (06 Jun 2023 00:24)  HR: 93 (06 Jun 2023 12:21) (68 - 93)  BP: 121/53 (06 Jun 2023 12:21) (117/69 - 149/70)  RR: 18 (06 Jun 2023 12:21) (18 - 18)  SpO2: 96% (06 Jun 2023 12:21) (94% - 97%)  Parameters below as of 06 Jun 2023 12:21  Patient On (Oxygen Delivery Method): room air  General: Elderly female sitting in chair comfortably. No acute distress  HEENT: EOMI. Clear conjunctivae. Moist mucus membrane  Neck: Supple.   Chest: CTA bilaterally. No wheezing, rales or rhonchi.   Heart: S1 & S2 with irregular rhythm.   Abdomen: Non distended. Soft. Non-tender. + BS  Ext: No pedal edema. No calf tenderness. Skin tears in right groin and left leg. Ecchymosis on both legs.    Neuro: Active and alert. Mild facial asymmetry. Motor 5/5 in RUE/RLE & 4-5/5 in LUE/LLE. Sensation intact. Reflexes 1+. Cerebellar sings intact. Slurred speech.   Skin: Warm and Dry  Psychiatry: Normal mood and affect

## 2023-06-02 NOTE — PROGRESS NOTE ADULT - SUBJECTIVE AND OBJECTIVE BOX
Preliminary note, offical recommendations pending attending review/signature   [facility] Stroke Team  Progress Note     HPI:  89F with PMH afib recently taken off AC who presented with Right  gaze deviation and Left sided weakness. Last known well 1900 5/28/23, was found by  at 2100 that day with symptoms. Patient taken to [facility], code stroke initiated, NIH 26, found to have Right M1 occlusion on CTA, additionally noted basilar thrombus per Dr. Perez . Patient was given Tenecteplase at 23:33 5/28/23 and was transferred to [facility] for mechanical thrombectomy. On arrival, patient aphasic, unable to perform ROS.   NIH 26, mRS 0 on admission  SUBJECTIVE: No events overnight.  No new neurologic complaints.  ROS reported negative unless otherwise noted.    albuterol    0.083% 2.5 milliGRAM(s) Nebulizer every 6 hours PRN  apixaban 2.5 milliGRAM(s) Oral every 12 hours  aspirin  chewable 81 milliGRAM(s) Oral daily  budesonide 160 MICROgram(s)/formoterol 4.5 MICROgram(s) Inhaler 2 Puff(s) Inhalation two times a day  chlorhexidine 2% Cloths 1 Application(s) Topical daily  digoxin     Tablet 125 MICROGram(s) Oral daily  famotidine    Tablet 20 milliGRAM(s) Oral daily  ferrous    sulfate 325 milliGRAM(s) Oral daily  hydrALAZINE Injectable 10 milliGRAM(s) IV Push every 2 hours PRN  labetalol Injectable 10 milliGRAM(s) IV Push every 2 hours PRN  levothyroxine 112 MICROGram(s) Oral daily  polyethylene glycol 3350 17 Gram(s) Oral daily  senna 2 Tablet(s) Oral at bedtime  simvastatin 10 milliGRAM(s) Oral at bedtime      PHYSICAL EXAM:   Vital Signs Last 24 Hrs  T(C): 36.1 (02 Jun 2023 07:36), Max: 37.1 (02 Jun 2023 00:11)  T(F): 97 (02 Jun 2023 07:36), Max: 98.7 (02 Jun 2023 00:11)  HR: 68 (02 Jun 2023 08:00) (63 - 83)  BP: 142/78 (02 Jun 2023 08:00) (109/83 - 142/78)  BP(mean): 89 (02 Jun 2023 08:00) (62 - 89)  RR: 18 (02 Jun 2023 08:00) (15 - 22)  SpO2: 96% (02 Jun 2023 08:00) (92% - 99%)    Parameters below as of 02 Jun 2023 08:00  Patient On (Oxygen Delivery Method): room air      PENDING  General: NAD, in bed   Mental status: The patient is awake and alert, oriented to self, place, may,  year.  The patient is able to name objects  , follows simple commands, repeat , speech overall fluent. No neglect.     Cranial nerves: Dysarthria, mild-moderate left facial palsy,  VF full with prompting- does break fixation but attends readily to hand movement , no right visual neglect appreciated . Extraocular motion is full with no nystagmus. There is no ptosis. Tongue midline.     Motor: There is normal bulk and tone.  There is no tremor.  Strength is 5/5 in the right arm and  4/5 leg.   Strength is 4+/5 in the left arm and 4/5 leg. Drift in arm/leg but does not hit bed.     Sensation: Intact to light touch and pin in 4 extremities, left sensory extinction resolved     Cerebellar: There is no dysmetria on finger to nose testing.    Gait : deferred    LABS:                        8.1    9.81  )-----------( 212      ( 02 Jun 2023 04:44 )             24.8    06-02    140  |  104  |  15.7  ----------------------------<  99  4.0   |  26.0  |  0.49<L>    Ca    8.9      02 Jun 2023 04:44      IMAGING: Reviewed by me.   see [facility] Pacs ([facility])     CT Head No Cont (05.30.23 @ 00:56)   IMPRESSION:  1. Right anterior MCA acute infarction is suspected  2. Right PCA distribution remote infarction  3. Ischemic white matter disease and atrophy typical for age  4. En plaque meningioma right anterior cranial fossa floor  5. Intracranial atherosclerosis       TTE Echo Complete w/ Contrast w/ Doppler (05.29.23 @ 12:49)   Summary:   1. Left ventricular ejection fraction, by visual estimation, is 60 to   65%.   2. Normal global left ventricular systolic function.   3. The mitral in-flow pattern reveals no discernable A-wave, therefore   no comment on diastolic function can be made.   4. There is mild concentric left ventricular hypertrophy.   5. Normal right ventricular sizeand function.   6. Moderate to severe left atrial enlargement.   7. Moderately enlarged right atrium.   8. Mild mitral valve regurgitation.   9. Sclerotic aortic valve with normal opening.  10. Mild aortic regurgitation.  11. Mild-moderate tricuspid regurgitation.  12. Estimated pulmonary artery systolic pressure is 40.2 mmHg assuming a   right atrial pressure of 10 mmHg, which is consistent with mild pulmonary   hypertension.  13. There is no evidence of pericardial effusion.     TTE Echo Limited or F/U (05.30.23 @ 15:15)   Summary:   1. Limited follow up agitated saline study.   2. Normal global left ventricular systolic function.   3. Left ventricular ejection fraction, by visual estimation, is 60 to   65%.   4. Color flow doppler and intravenous injection of agitated saline   demonstrates the presence of an intact intra atrial septum.    CT Head No Cont (05.31.23 @ 17:20)     The ventricular and sulcal size and configuration is age appropriate.      There are moderate patchy and confluent areas of hypodensity in the   periventricular and subcortical white matter which are likely related to   chronic microangiopathic changes. There is more conspicuous loss of   gray-white differentiation involving the right caudate, right basal   ganglia and anterior right insula consistent with evolving infarction. No   interval hemorrhagic transformation. There is redemonstrated right   occipital chronic infarction.    There is no evidence of mass effect, midline shift, acute intracranial   hemorrhage, or extra-axial collections.   The calvarium is intact. The paranasal sinuses are clear.The mastoid air   cells are predominantly clear. There has been prior bilateral cataract   surgery.      Evolving acute infarction as described above. Nointerval hemorrhagic   transformation. Chronic microvascular ischemic changes and chronic right   occipital infarction.           Preliminary note, offical recommendations pending attending review/signature   [facility] Stroke Team  Progress Note     HPI:  89F with PMH afib recently taken off AC who presented with Right  gaze deviation and Left sided weakness. Last known well 1900 5/28/23, was found by  at 2100 that day with symptoms. [place] [facility] code stroke initiated, NIH 26, found to have Right M1 occlusion on CTA, additionally noted basilar thrombus per Dr. Perez . Patient was given Tenecteplase at 23:33 5/28/23 and was transferred to [facility] for mechanical thrombectomy. On arrival, patient aphasic, unable to perform ROS.   NIH 26, mRS 0 on admission    SUBJECTIVE: Patient states to be feeling better. No events overnight.  No new neurologic complaints.  ROS reported negative unless otherwise noted.    albuterol    0.083% 2.5 milliGRAM(s) Nebulizer every 6 hours PRN  apixaban 2.5 milliGRAM(s) Oral every 12 hours  aspirin  chewable 81 milliGRAM(s) Oral daily  budesonide 160 MICROgram(s)/formoterol 4.5 MICROgram(s) Inhaler 2 Puff(s) Inhalation two times a day  chlorhexidine 2% Cloths 1 Application(s) Topical daily  digoxin     Tablet 125 MICROGram(s) Oral daily  famotidine    Tablet 20 milliGRAM(s) Oral daily  ferrous    sulfate 325 milliGRAM(s) Oral daily  hydrALAZINE Injectable 10 milliGRAM(s) IV Push every 2 hours PRN  labetalol Injectable 10 milliGRAM(s) IV Push every 2 hours PRN  levothyroxine 112 MICROGram(s) Oral daily  polyethylene glycol 3350 17 Gram(s) Oral daily  senna 2 Tablet(s) Oral at bedtime  simvastatin 10 milliGRAM(s) Oral at bedtime      PHYSICAL EXAM:   Vital Signs Last 24 Hrs  T(C): 36.1 (02 Jun 2023 07:36), Max: 37.1 (02 Jun 2023 00:11)  T(F): 97 (02 Jun 2023 07:36), Max: 98.7 (02 Jun 2023 00:11)  HR: 68 (02 Jun 2023 08:00) (63 - 83)  BP: 142/78 (02 Jun 2023 08:00) (109/83 - 142/78)  BP(mean): 89 (02 Jun 2023 08:00) (62 - 89)  RR: 18 (02 Jun 2023 08:00) (15 - 22)  SpO2: 96% (02 Jun 2023 08:00) (92% - 99%)    Parameters below as of 02 Jun 2023 08:00  Patient On (Oxygen Delivery Method): room air      General: NAD, in bed   Mental status: The patient is awake and alert, oriented to self, place, may,  year.  The patient is able to name objects  , follows simple commands, repeat , speech overall fluent. No neglect. No dysarthria    Cranial nerves: mild-moderate left facial palsy, Visual fields intact and able to state amount of fingers in peripheral fields, showing improvement. no right visual neglect appreciated . Extraocular motion is full with no nystagmus. There is no ptosis. Tongue midline.     Motor: There is normal bulk and tone.  There is no tremor.  Strength is 5/5 in the right arm and 5/5 in the right leg  Left arm is 4/5 when tested with right arm and 5/5 independently  Left leg is 4/5 when tested with right arm and 5/5 independently    Sensation: Intact to light touch and pin in 4 extremities with Slight decrease sensation in left lower extremity 98% compared to 100% in right lower extremity.  , left sensory extinction resolved.      Cerebellar: There is no dysmetria on finger to nose testing.    Gait : deferred    LABS:                        8.1    9.81  )-----------( 212      ( 02 Jun 2023 04:44 )             24.8    06-02    140  |  104  |  15.7  ----------------------------<  99  4.0   |  26.0  |  0.49<L>    Ca    8.9      02 Jun 2023 04:44      IMAGING: Reviewed by me.   see [facility] ([facility])     CT Head No Cont (05.30.23 @ 00:56)   IMPRESSION:  1. Right anterior MCA acute infarction is suspected  2. Right PCA distribution remote infarction  3. Ischemic white matter disease and atrophy typical for age  4. En plaque meningioma right anterior cranial fossa floor  5. Intracranial atherosclerosis       TTE Echo Complete w/ Contrast w/ Doppler (05.29.23 @ 12:49)   Summary:   1. Left ventricular ejection fraction, by visual estimation, is 60 to   65%.   2. Normal global left ventricular systolic function.   3. The mitral in-flow pattern reveals no discernable A-wave, therefore   no comment on diastolic function can be made.   4. There is mild concentric left ventricular hypertrophy.   5. Normal right ventricular sizeand function.   6. Moderate to severe left atrial enlargement.   7. Moderately enlarged right atrium.   8. Mild mitral valve regurgitation.   9. Sclerotic aortic valve with normal opening.  10. Mild aortic regurgitation.  11. Mild-moderate tricuspid regurgitation.  12. Estimated pulmonary artery systolic pressure is 40.2 mmHg assuming a   right atrial pressure of 10 mmHg, which is consistent with mild pulmonary   hypertension.  13. There is no evidence of pericardial effusion.     TTE Echo Limited or F/U (05.30.23 @ 15:15)   Summary:   1. Limited follow up agitated saline study.   2. Normal global left ventricular systolic function.   3. Left ventricular ejection fraction, by visual estimation, is 60 to   65%.   4. Color flow doppler and intravenous injection of agitated saline   demonstrates the presence of an intact intra atrial septum.    CT Head No Cont (05.31.23 @ 17:20)     The ventricular and sulcal size and configuration is age appropriate.      There are moderate patchy and confluent areas of hypodensity in the   periventricular and subcortical white matter which are likely related to   chronic microangiopathic changes. There is more conspicuous loss of   gray-white differentiation involving the right caudate, right basal   ganglia and anterior right insula consistent with evolving infarction. No   interval hemorrhagic transformation. There is redemonstrated right   occipital chronic infarction.    There is no evidence of mass effect, midline shift, acute intracranial   hemorrhage, or extra-axial collections.   The calvarium is intact. The paranasal sinuses are clear.The mastoid air   cells are predominantly clear. There has been prior bilateral cataract   surgery.      Evolving acute infarction as described above. Nointerval hemorrhagic   transformation. Chronic microvascular ischemic changes and chronic right   occipital infarction.           Preliminary note, offical recommendations pending attending review/signature   [facility] Stroke Team  Progress Note     HPI:  89F with PMH afib recently taken off AC who presented with Right  gaze deviation and Left sided weakness. Last known well 1900 5/28/23, was found by  at 2100 that day with symptoms. Patient taken to [facility], code stroke initiated, NIH 26, found to have Right M1 occlusion on CTA, additionally noted basilar thrombus per Dr. Perez . Patient was given Tenecteplase at 23:33 5/28/23 and was transferred to [facility] for mechanical thrombectomy. On arrival, patient aphasic, unable to perform ROS.   NIH 26, mRS 0 on admission    SUBJECTIVE: Patient states to be feeling better. No events overnight.  No new neurologic complaints.  ROS reported negative unless otherwise noted.    albuterol    0.083% 2.5 milliGRAM(s) Nebulizer every 6 hours PRN  apixaban 2.5 milliGRAM(s) Oral every 12 hours  aspirin  chewable 81 milliGRAM(s) Oral daily  budesonide 160 MICROgram(s)/formoterol 4.5 MICROgram(s) Inhaler 2 Puff(s) Inhalation two times a day  chlorhexidine 2% Cloths 1 Application(s) Topical daily  digoxin     Tablet 125 MICROGram(s) Oral daily  famotidine    Tablet 20 milliGRAM(s) Oral daily  ferrous    sulfate 325 milliGRAM(s) Oral daily  hydrALAZINE Injectable 10 milliGRAM(s) IV Push every 2 hours PRN  labetalol Injectable 10 milliGRAM(s) IV Push every 2 hours PRN  levothyroxine 112 MICROGram(s) Oral daily  polyethylene glycol 3350 17 Gram(s) Oral daily  senna 2 Tablet(s) Oral at bedtime  simvastatin 10 milliGRAM(s) Oral at bedtime      PHYSICAL EXAM:   Vital Signs Last 24 Hrs  T(C): 36.1 (02 Jun 2023 07:36), Max: 37.1 (02 Jun 2023 00:11)  T(F): 97 (02 Jun 2023 07:36), Max: 98.7 (02 Jun 2023 00:11)  HR: 68 (02 Jun 2023 08:00) (63 - 83)  BP: 142/78 (02 Jun 2023 08:00) (109/83 - 142/78)  BP(mean): 89 (02 Jun 2023 08:00) (62 - 89)  RR: 18 (02 Jun 2023 08:00) (15 - 22)  SpO2: 96% (02 Jun 2023 08:00) (92% - 99%)    Parameters below as of 02 Jun 2023 08:00  Patient On (Oxygen Delivery Method): room air      General: NAD, in chair  Mental status: The patient is awake and alert, oriented to self, place, may,  year.  The patient is able to name objects  , follows simple commands, repeat , speech overall fluent. No neglect. No dysarthria    Cranial nerves: mild-moderate left facial palsy, Visual fields intact and able to state amount of fingers in peripheral fields, showing improvement. no  visual neglect. Extraocular motion is full with no nystagmus. There is no ptosis. Tongue midline.     Motor: There is normal bulk and tone.  There is no tremor.  Strength is 5/5 in the right arm and 5/5 in the right leg  Left arm is 4/5 when tested with right arm and 5/5 independently  Left leg is 4/5 when tested with right arm and 5/5 independently    Sensation: Intact to light touch and pin in 4 extremities with Slight decrease sensation in left lower extremity 98% compared to 100% in right lower extremity.  , left sensory extinction resolved.      Cerebellar: There is no dysmetria on finger to nose testing.    Gait : deferred    Right groin site- clean dry and intact. Temperature is warm and symmetric bilaterally. No cyanosis.  LABS:                        8.1    9.81  )-----------( 212      ( 02 Jun 2023 04:44 )             24.8    06-02    140  |  104  |  15.7  ----------------------------<  99  4.0   |  26.0  |  0.49<L>    Ca    8.9      02 Jun 2023 04:44      IMAGING: Reviewed by me.   see [facility] Pacs ([facility])     CT Head No Cont (05.30.23 @ 00:56)   IMPRESSION:  1. Right anterior MCA acute infarction is suspected  2. Right PCA distribution remote infarction  3. Ischemic white matter disease and atrophy typical for age  4. En plaque meningioma right anterior cranial fossa floor  5. Intracranial atherosclerosis       TTE Echo Complete w/ Contrast w/ Doppler (05.29.23 @ 12:49)   Summary:   1. Left ventricular ejection fraction, by visual estimation, is 60 to   65%.   2. Normal global left ventricular systolic function.   3. The mitral in-flow pattern reveals no discernable A-wave, therefore   no comment on diastolic function can be made.   4. There is mild concentric left ventricular hypertrophy.   5. Normal right ventricular sizeand function.   6. Moderate to severe left atrial enlargement.   7. Moderately enlarged right atrium.   8. Mild mitral valve regurgitation.   9. Sclerotic aortic valve with normal opening.  10. Mild aortic regurgitation.  11. Mild-moderate tricuspid regurgitation.  12. Estimated pulmonary artery systolic pressure is 40.2 mmHg assuming a   right atrial pressure of 10 mmHg, which is consistent with mild pulmonary   hypertension.  13. There is no evidence of pericardial effusion.     TTE Echo Limited or F/U (05.30.23 @ 15:15)   Summary:   1. Limited follow up agitated saline study.   2. Normal global left ventricular systolic function.   3. Left ventricular ejection fraction, by visual estimation, is 60 to   65%.   4. Color flow doppler and intravenous injection of agitated saline   demonstrates the presence of an intact intra atrial septum.    CT Head No Cont (05.31.23 @ 17:20)     The ventricular and sulcal size and configuration is age appropriate.      There are moderate patchy and confluent areas of hypodensity in the   periventricular and subcortical white matter which are likely related to   chronic microangiopathic changes. There is more conspicuous loss of   gray-white differentiation involving the right caudate, right basal   ganglia and anterior right insula consistent with evolving infarction. No   interval hemorrhagic transformation. There is redemonstrated right   occipital chronic infarction.    There is no evidence of mass effect, midline shift, acute intracranial   hemorrhage, or extra-axial collections.   The calvarium is intact. The paranasal sinuses are clear.The mastoid air   cells are predominantly clear. There has been prior bilateral cataract   surgery.      Evolving acute infarction as described above. Nointerval hemorrhagic   transformation. Chronic microvascular ischemic changes and chronic right   occipital infarction.           [facility] Stroke Team  Progress Note     HPI:  89F with PMH afib recently taken off AC who presented with Right  gaze deviation and Left sided weakness. Last known well 1900 5/28/23, was found by  at 2100 that day with symptoms. [place] [facility] initiated, NIH 26, found to have Right M1 occlusion on CTA, additionally noted basilar thrombus per Dr. Perez . Patient was given Tenecteplase at 23:33 5/28/23 and was transferred to [facility] for mechanical thrombectomy. On arrival, patient aphasic, unable to perform ROS. NIH 26, mRS 0 on admission. Pt underwent mechanical thrombectomy with TICI 3 recanalization of the right MCA.    SUBJECTIVE: Patient states to be feeling better. No events overnight.  No new neurologic complaints.  ROS reported negative unless otherwise noted.    albuterol    0.083% 2.5 milliGRAM(s) Nebulizer every 6 hours PRN  apixaban 2.5 milliGRAM(s) Oral every 12 hours  aspirin  chewable 81 milliGRAM(s) Oral daily  budesonide 160 MICROgram(s)/formoterol 4.5 MICROgram(s) Inhaler 2 Puff(s) Inhalation two times a day  chlorhexidine 2% Cloths 1 Application(s) Topical daily  digoxin     Tablet 125 MICROGram(s) Oral daily  famotidine    Tablet 20 milliGRAM(s) Oral daily  ferrous    sulfate 325 milliGRAM(s) Oral daily  hydrALAZINE Injectable 10 milliGRAM(s) IV Push every 2 hours PRN  labetalol Injectable 10 milliGRAM(s) IV Push every 2 hours PRN  levothyroxine 112 MICROGram(s) Oral daily  polyethylene glycol 3350 17 Gram(s) Oral daily  senna 2 Tablet(s) Oral at bedtime  simvastatin 10 milliGRAM(s) Oral at bedtime      PHYSICAL EXAM:   Vital Signs Last 24 Hrs  T(C): 36.1 (02 Jun 2023 07:36), Max: 37.1 (02 Jun 2023 00:11)  T(F): 97 (02 Jun 2023 07:36), Max: 98.7 (02 Jun 2023 00:11)  HR: 68 (02 Jun 2023 08:00) (63 - 83)  BP: 142/78 (02 Jun 2023 08:00) (109/83 - 142/78)  BP(mean): 89 (02 Jun 2023 08:00) (62 - 89)  RR: 18 (02 Jun 2023 08:00) (15 - 22)  SpO2: 96% (02 Jun 2023 08:00) (92% - 99%)    Parameters below as of 02 Jun 2023 08:00  Patient On (Oxygen Delivery Method): room air      General: NAD, in chair  Mental status: The patient is awake and alert, oriented to self, place, may,  year.  The patient is able to name objects  , follows simple commands, repeat , speech overall fluent. No neglect. No dysarthria    Cranial nerves: mild-moderate left facial palsy, Visual fields intact and able to state amount of fingers in peripheral fields, showing improvement. no  visual neglect. Extraocular motion is full with no nystagmus. There is no ptosis. Tongue midline.     Motor: There is normal bulk and tone.  There is no tremor.  Strength is 5/5 in the right arm and 5/5 in the right leg  Left arm is 4/5 when tested with right arm and 5/5 independently  Left leg is 4/5 when tested with right arm and 5/5 independently    Sensation: Intact to light touch and pin in 4 extremities with Slight decrease sensation in left lower extremity 98% compared to 100% in right lower extremity.  , left sensory extinction resolved.      Cerebellar: There is no dysmetria on finger to nose testing.    Gait : deferred    Right groin site- clean dry and intact. Temperature is warm and symmetric bilaterally. No cyanosis.  LABS:                        8.1    9.81  )-----------( 212      ( 02 Jun 2023 04:44 )             24.8    06-02    140  |  104  |  15.7  ----------------------------<  99  4.0   |  26.0  |  0.49<L>    Ca    8.9      02 Jun 2023 04:44      IMAGING: Reviewed by me.   see [facility]     CT Head No Cont (05.30.23 @ 00:56)   IMPRESSION:  1. Right anterior MCA acute infarction is suspected  2. Right PCA distribution remote infarction  3. Ischemic white matter disease and atrophy typical for age  4. En plaque meningioma right anterior cranial fossa floor  5. Intracranial atherosclerosis       TTE Echo Complete w/ Contrast w/ Doppler (05.29.23 @ 12:49)   Summary:   1. Left ventricular ejection fraction, by visual estimation, is 60 to   65%.   2. Normal global left ventricular systolic function.   3. The mitral in-flow pattern reveals no discernable A-wave, therefore   no comment on diastolic function can be made.   4. There is mild concentric left ventricular hypertrophy.   5. Normal right ventricular sizeand function.   6. Moderate to severe left atrial enlargement.   7. Moderately enlarged right atrium.   8. Mild mitral valve regurgitation.   9. Sclerotic aortic valve with normal opening.  10. Mild aortic regurgitation.  11. Mild-moderate tricuspid regurgitation.  12. Estimated pulmonary artery systolic pressure is 40.2 mmHg assuming a   right atrial pressure of 10 mmHg, which is consistent with mild pulmonary   hypertension.  13. There is no evidence of pericardial effusion.     TTE Echo Limited or F/U (05.30.23 @ 15:15)   Summary:   1. Limited follow up agitated saline study.   2. Normal global left ventricular systolic function.   3. Left ventricular ejection fraction, by visual estimation, is 60 to   65%.   4. Color flow doppler and intravenous injection of agitated saline   demonstrates the presence of an intact intra atrial septum.    CT Head No Cont (05.31.23 @ 17:20)     The ventricular and sulcal size and configuration is age appropriate.      There are moderate patchy and confluent areas of hypodensity in the   periventricular and subcortical white matter which are likely related to   chronic microangiopathic changes. There is more conspicuous loss of   gray-white differentiation involving the right caudate, right basal   ganglia and anterior right insula consistent with evolving infarction. No   interval hemorrhagic transformation. There is redemonstrated right   occipital chronic infarction.    There is no evidence of mass effect, midline shift, acute intracranial   hemorrhage, or extra-axial collections.   The calvarium is intact. The paranasal sinuses are clear.The mastoid air   cells are predominantly clear. There has been prior bilateral cataract   surgery.      Evolving acute infarction as described above. Nointerval hemorrhagic   transformation. Chronic microvascular ischemic changes and chronic right   occipital infarction.

## 2023-06-02 NOTE — PROGRESS NOTE ADULT - SUBJECTIVE AND OBJECTIVE BOX
Patient seen and examined in chair at bedside. Reports feeling bored. Patient is motivated to get out and go to rehab.    FUNCTIONAL PROGRESS    6/1 SLP   Speech Language Pathology Recommendations: 1. Regular diet, MILDLY thick liquids2. CHIN TUCK POSTURE FOR ALL PO3. 1:1 assist for PO4. Strict aspiration precautions 5. Upright for PO, slow rate, small bites/sips, alternate solids/liquids, oral care6. Oral care  7. SLP to follow as schedule allows     5/30 PT  Bed Mobility Analysis:     · Bed Mobility Limitations	Pt OOB in chair    Transfer: Sit to Stand:     · Level of [place]	minimum assist (75% patients effort)  · Physical Assist/Nonphysical Assist	2 person assist  · Weight-Bearing Restrictions	weight-bearing as tolerated  · Assistive Device	rolling walker    Transfer: Stand to Sit:     · Level of [place]	minimum assist (75% patients effort)  · Physical Assist/Nonphysical Assist	2 person assist  · Weight-Bearing Restrictions	weight-bearing as tolerated  · Assistive Device	rolling walker    Sit/Stand Transfer Safety Analysis:     · Transfer Safety Concerns Noted	decreased safety awareness; decreased proprioception; decreased sequencing ability; decreased weight-shifting ability; difficulty gripping and maintaining Left hand on RW, + left/posterior lean throughout.  · Impairments Contributing to Impaired Transfers	impaired balance; impaired postural control; decreased strength; impaired motor control; impaired coordination    Gait Skills:     · Level of [place]	moderate assist (50% patients effort)  · Physical Assist/Nonphysical Assist	1 person + 1 person to manage equipment; 2nd assist for line management  · Weight-Bearing Restrictions	weight-bearing as tolerated  · Assistive Device	rolling walker  · Gait Distance	3 feet    5/30 OT  Bathing Training:   · Level of [place]	moderate assist (50% patients effort); maximum assist (25% patients effort)    Upper Body Dressing Training:   · Level of [place]	moderate assist (50% patients effort)    Lower Body Dressing Training:   · Level of [place]	maximum assist (25% patients effort)    Toilet Hygiene Training:   · Level of [place]	to assess    Grooming Training:   · Level of [place]	moderate assist (50% patients effort)    Eating/Self-Feeding Training:   · Level of [place]	Did not directly observe      Vital Signs Last 24 Hrs  T(C): 36.1 (02 Jun 2023 07:36), Max: 37.1 (02 Jun 2023 00:11)  T(F): 97 (02 Jun 2023 07:36), Max: 98.7 (02 Jun 2023 00:11)  HR: 80 (02 Jun 2023 12:00) (63 - 88)  BP: 124/57 (02 Jun 2023 12:00) (109/83 - 142/78)  BP(mean): 60 (02 Jun 2023 12:00) (60 - 89)  RR: 20 (02 Jun 2023 12:00) (15 - 22)  SpO2: 100% (02 Jun 2023 12:00) (92% - 100%)    Parameters below as of 02 Jun 2023 12:00  Patient On (Oxygen Delivery Method): room air    MEDICATIONS  (STANDING):  apixaban 2.5 milliGRAM(s) Oral every 12 hours  aspirin  chewable 81 milliGRAM(s) Oral daily  budesonide 160 MICROgram(s)/formoterol 4.5 MICROgram(s) Inhaler 2 Puff(s) Inhalation two times a day  chlorhexidine 2% Cloths 1 Application(s) Topical daily  digoxin     Tablet 125 MICROGram(s) Oral daily  famotidine    Tablet 20 milliGRAM(s) Oral daily  ferrous    sulfate 325 milliGRAM(s) Oral daily  levothyroxine 112 MICROGram(s) Oral daily  polyethylene glycol 3350 17 Gram(s) Oral daily  senna 2 Tablet(s) Oral at bedtime  simvastatin 10 milliGRAM(s) Oral at bedtime    MEDICATIONS  (PRN):  albuterol    0.083% 2.5 milliGRAM(s) Nebulizer every 6 hours PRN Shortness of Breath and/or Wheezing  hydrALAZINE Injectable 10 milliGRAM(s) IV Push every 2 hours PRN SBP >140  labetalol Injectable 10 milliGRAM(s) IV Push every 2 hours PRN SBP >140      RECENT LABS/IMAGING  - Reviewed Today                        8.1    9.81  )-----------( 212      ( 02 Jun 2023 04:44 )             24.8     06-02    140  |  104  |  15.7  ----------------------------<  99  4.0   |  26.0  |  0.49<L>    Ca    8.9      02 Jun 2023 04:44    MR Head No Cont (06.02.23 @ 10:20)   IMPRESSION:  Multiple bilateral acute/subacute infarcts as described above. No acute intracranial hemorrhage    --reviewed previously  BLE V DOPPLERS 5/29 - No evidence of deep venous thrombosis in either lower extremity.    HEAD CT 5/30 - 1. Right anterior MCA acute infarction is suspected 2. Right PCA distribution remote infarction 3. Ischemic white matter disease and atrophy typical for age 4. En plaque meningioma right anterior cranial fossa floor 5. Intracranial atherosclerosis     HEAD CT 5/31 - Evolving acute infarction as described above. No interval hemorrhagic transformation. Chronic microvascular ischemic changes and chronic right occipital infarction.  -------------------------------------------------------------------------------------------------  PHYSICAL EXAM  Constitutional - NAD, Comfortable  Extremities - No edema  Neurologic Exam -                    Cognitive - AAOx4     Communication - Expressive deficits, Delayed processing, +Dysarthria     Cranial Nerves - Left lip droop      Motor -   Left hemiparesis, Impaired fine motor/coordination      Sensory - Intact to LT  Psychiatric - Mildly anxious  ----------------------------------------------------------------------------------------  ASSESSMENT/PLAN  89yFemale with functional deficits after an acute CVA  Acute right M1 occlusion s/p TNK & thrombectomy - ASA, Zocor  AFIB - Digoxin  HTN- Hydralazine, Labetalol  Anemia - Iron, Venofer  Pulm - Albuterol, Symbicort  UTI - Rocephin   Pain - Tylenol  DVT PPX - SCDs, Lovenox  Rehab/Impaired mobility and function - Patient continues to require hospitalization for the above diagnoses and ongoing active management of comorbid complications (IV HTN medications) that are substantially impairing functional ability and impairing quality of life necessitating acute rehab.    Based on the patient's diagnosis, functional status and potential for progress, continue to recommend ACUTE inpatient rehabilitation for the functional deficits consisting of 3 hours of therapy/day & 24 hour RN/daily PMR physician for comorbid medical management. Patient will be able to tolerate 3 hours a day.    Recommend ongoing mobilization by staff to maintain cardiopulmonary function and prevention of secondary complications related to debility. Discussed the specific management and recommendations above with rehab clinical care team/rehab liaison. Will sign-off, please reconsult if you require further considerations concerning dispo.      Patient seen and examined in chair at bedside. Reports feeling bored. Patient is motivated to get out and go to rehab.    FUNCTIONAL PROGRESS    6/1 SLP   Speech Language Pathology Recommendations: 1. Regular diet, MILDLY thick liquids2. CHIN TUCK POSTURE FOR ALL PO3. 1:1 assist for PO4. Strict aspiration precautions 5. Upright for PO, slow rate, small bites/sips, alternate solids/liquids, oral care6. Oral care  7. SLP to follow as schedule allows     5/30 PT  Bed Mobility Analysis:     · Bed Mobility Limitations	Pt OOB in chair    Transfer: Sit to Stand:     · Level of [place]	minimum assist (75% patients effort)  · Physical Assist/Nonphysical Assist	2 person assist  · Weight-Bearing Restrictions	weight-bearing as tolerated  · Assistive Device	rolling walker    Transfer: Stand to Sit:     · Level of [place]	minimum assist (75% patients effort)  · Physical Assist/Nonphysical Assist	2 person assist  · Weight-Bearing Restrictions	weight-bearing as tolerated  · Assistive Device	rolling walker    Sit/Stand Transfer Safety Analysis:     · Transfer Safety Concerns Noted	decreased safety awareness; decreased proprioception; decreased sequencing ability; decreased weight-shifting ability; difficulty gripping and maintaining Left hand on RW, + left/posterior lean throughout.  · Impairments Contributing to Impaired Transfers	impaired balance; impaired postural control; decreased strength; impaired motor control; impaired coordination    Gait Skills:     · Level of [place]	moderate assist (50% patients effort)  · Physical Assist/Nonphysical Assist	1 person + 1 person to manage equipment; 2nd assist for line management  · Weight-Bearing Restrictions	weight-bearing as tolerated  · Assistive Device	rolling walker  · Gait Distance	3 feet    5/30 OT  Bathing Training:   · Level of [place]	moderate assist (50% patients effort); maximum assist (25% patients effort)    Upper Body Dressing Training:   · Level of [place]	moderate assist (50% patients effort)    Lower Body Dressing Training:   · Level of [place]	maximum assist (25% patients effort)    Toilet Hygiene Training:   · Level of [place]	to assess    Grooming Training:   · Level of [place]	moderate assist (50% patients effort)    Eating/Self-Feeding Training:   · Level of [place]	Did not directly observe      Vital Signs Last 24 Hrs  T(C): 36.1 (02 Jun 2023 07:36), Max: 37.1 (02 Jun 2023 00:11)  T(F): 97 (02 Jun 2023 07:36), Max: 98.7 (02 Jun 2023 00:11)  HR: 80 (02 Jun 2023 12:00) (63 - 88)  BP: 124/57 (02 Jun 2023 12:00) (109/83 - 142/78)  BP(mean): 60 (02 Jun 2023 12:00) (60 - 89)  RR: 20 (02 Jun 2023 12:00) (15 - 22)  SpO2: 100% (02 Jun 2023 12:00) (92% - 100%)    Parameters below as of 02 Jun 2023 12:00  Patient On (Oxygen Delivery Method): room air    MEDICATIONS  (STANDING):  apixaban 2.5 milliGRAM(s) Oral every 12 hours  aspirin  chewable 81 milliGRAM(s) Oral daily  budesonide 160 MICROgram(s)/formoterol 4.5 MICROgram(s) Inhaler 2 Puff(s) Inhalation two times a day  chlorhexidine 2% Cloths 1 Application(s) Topical daily  digoxin     Tablet 125 MICROGram(s) Oral daily  famotidine    Tablet 20 milliGRAM(s) Oral daily  ferrous    sulfate 325 milliGRAM(s) Oral daily  levothyroxine 112 MICROGram(s) Oral daily  polyethylene glycol 3350 17 Gram(s) Oral daily  senna 2 Tablet(s) Oral at bedtime  simvastatin 10 milliGRAM(s) Oral at bedtime    MEDICATIONS  (PRN):  albuterol    0.083% 2.5 milliGRAM(s) Nebulizer every 6 hours PRN Shortness of Breath and/or Wheezing  hydrALAZINE Injectable 10 milliGRAM(s) IV Push every 2 hours PRN SBP >140  labetalol Injectable 10 milliGRAM(s) IV Push every 2 hours PRN SBP >140      RECENT LABS/IMAGING  - Reviewed Today                        8.1    9.81  )-----------( 212      ( 02 Jun 2023 04:44 )             24.8     06-02    140  |  104  |  15.7  ----------------------------<  99  4.0   |  26.0  |  0.49<L>    Ca    8.9      02 Jun 2023 04:44    MR Head No Cont (06.02.23 @ 10:20)   IMPRESSION:  Multiple bilateral acute/subacute infarcts as described above. No acute intracranial hemorrhage    --reviewed previously  BLE V DOPPLERS 5/29 - No evidence of deep venous thrombosis in either lower extremity.    HEAD CT 5/30 - 1. Right anterior MCA acute infarction is suspected 2. Right PCA distribution remote infarction 3. Ischemic white matter disease and atrophy typical for age 4. En plaque meningioma right anterior cranial fossa floor 5. Intracranial atherosclerosis     HEAD CT 5/31 - Evolving acute infarction as described above. No interval hemorrhagic transformation. Chronic microvascular ischemic changes and chronic right occipital infarction.  -------------------------------------------------------------------------------------------------  PHYSICAL EXAM  Constitutional - NAD, Comfortable  Extremities - No edema  Neurologic Exam -                    Cognitive - AAOx4     Communication - Expressive deficits, Delayed processing, +Dysarthria     Cranial Nerves - Left lip droop      Motor -   Left hemiparesis, Impaired fine motor/coordination      Sensory - Intact to LT  Psychiatric - Mildly anxious  ----------------------------------------------------------------------------------------  ASSESSMENT/PLAN  89yFemale with functional deficits after an acute CVA  Acute right M1 occlusion s/p TNK & thrombectomy - ASA, Zocor  AFIB - Digoxin  HTN- Hydralazine, Labetalol  Anemia - Iron, Venofer  Pulm - Albuterol, Symbicort  UTI - Rocephin   Pain - Tylenol  DVT PPX - SCDs, Lovenox

## 2023-06-02 NOTE — DIETITIAN INITIAL EVALUATION ADULT - NS FNS DIET ORDER
Diet, DASH/TLC:   Sodium & Cholesterol Restricted  Mildly Thick Liquids (MILDTHICKLIQS) (05-31-23 @ 10:05)

## 2023-06-02 NOTE — DISCHARGE NOTE PROVIDER - NSDCMRMEDTOKEN_GEN_ALL_CORE_FT
amLODIPine 5 mg oral tablet: 1 orally once a day  cranberry oral capsule: orally  digoxin 125 mcg (0.125 mg) oral tablet: 1 orally once a day  Eliquis 2.5 mg oral tablet: 1 orally 2 times a day  famotidine 20 mg oral tablet: 1 orally once a day  hydroCHLOROthiazide 25 mg oral tablet: 1 orally once a day  ramipril 10 mg oral tablet: 1 tab(s) orally once a day  simvastatin 20 mg oral tablet: 1 orally once a day  Synthroid 112 mcg (0.112 mg) oral tablet: 1 orally once a day  Wixela Inhub 100 mcg-50 mcg inhalation powder: 1 inhaled  Xopenex 0.31 mg/3 mL inhalation solution: 3 by nebulizer   amLODIPine 5 mg oral tablet: 1 orally once a day  ascorbic acid 500 mg oral tablet: 1 tab(s) orally once a day  aspirin 81 mg oral tablet, chewable: 1 tab(s) orally once a day  cranberry oral capsule: orally  digoxin 125 mcg (0.125 mg) oral tablet: 1 orally once a day  Eliquis 2.5 mg oral tablet: 1 orally 2 times a day  famotidine 20 mg oral tablet: 1 orally once a day  ferrous sulfate 325 mg (65 mg elemental iron) oral tablet: 1 tab(s) orally once a day  Multiple Vitamins oral tablet: 1 tab(s) orally once a day  polyethylene glycol 3350 oral powder for reconstitution: 17 gram(s) orally once a day Hold for diarrhea.  simvastatin 20 mg oral tablet: 1 orally once a day  Synthroid 112 mcg (0.112 mg) oral tablet: 1 orally once a day  Wixela Inhub 100 mcg-50 mcg inhalation powder: 1 inhaled  Xopenex 0.31 mg/3 mL inhalation solution: 3 by nebulizer

## 2023-06-02 NOTE — DIETITIAN INITIAL EVALUATION ADULT - RD TO REMAIN AVAILABLE
[No Acute Distress] : no acute distress [Well Nourished] : well nourished [PERRL] : pupils equal round and reactive to light [EOMI] : extraocular movements intact [Normal Oropharynx] : the oropharynx was normal [Normal TMs] : both tympanic membranes were normal [Normal Nasal Mucosa] : the nasal mucosa was normal [No Lymphadenopathy] : no lymphadenopathy [Supple] : supple [Thyroid Normal, No Nodules] : the thyroid was normal and there were no nodules present [No Accessory Muscle Use] : no accessory muscle use yes [Clear to Auscultation] : lungs were clear to auscultation bilaterally [Regular Rhythm] : with a regular rhythm [Normal S1, S2] : normal S1 and S2 [No Murmur] : no murmur heard [No Edema] : there was no peripheral edema [Soft] : abdomen soft [Non Tender] : non-tender [Non-distended] : non-distended [No Masses] : no abdominal mass palpated [No HSM] : no HSM [Normal Bowel Sounds] : normal bowel sounds [Normal Supraclavicular Nodes] : no supraclavicular lymphadenopathy [Normal Posterior Cervical Nodes] : no posterior cervical lymphadenopathy [Normal Anterior Cervical Nodes] : no anterior cervical lymphadenopathy [Normal Affect] : the affect was normal [Normal Insight/Judgement] : insight and judgment were intact [de-identified] : deferred to gyn

## 2023-06-02 NOTE — DIETITIAN INITIAL EVALUATION ADULT - ORAL INTAKE PTA/DIET HISTORY
Patient not in room at time of visit, ?off unit for test. Breakfast tray observed, untouched, RD to follow up when feasible for nutrition interview and NFPE.

## 2023-06-02 NOTE — DIETITIAN INITIAL EVALUATION ADULT - OTHER INFO
89 y/o F w/ PMH of AF (Eliquis stopped 5/23 by cardiologist due to freq falls), HTN, asthma/bronchiectasis, latent TB, hypothyroidism, HLD, arthritis, chronic dizziness, macular degeneration. 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  Pt received TNK and transferred to [facility] neuroICU as a code biplane.  She was emergently taken to angio for thrombectomy where TICI 3 revascularization of the Rt M1 was obtained.  Pts neurologic exam has continued to improved.  Pt was noted to have worsening mentation when SBP <110 so she was maintained on светлана x 24 hours and was d/c'd this morning. Her SBP has since been liberalized and neurologic exam continues to improved.  UA on admission noted to be positive, UCx w/ E.coli, currently on ceftriaxone.  Pt medically stable for transfer to medicine SDU. S/P MBS 6/1, regular texture solids, mildly thick liquids recommended.

## 2023-06-02 NOTE — DISCHARGE NOTE PROVIDER - HOSPITAL COURSE
89 y/o F w/ PMH of AF (Eliquis stopped 5/23 by cardiologist due to freq falls), HTN, asthma/bronchiectasis, latent TB, hypothroidism, HLD, arthritis, chronic dizziness, macular degeneration. 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  Pt received TNK and transferred to [facility] neuroICU as a code biplane.  She was emergently taken to angio for thrombectomy where TICI 3 revascularization of the Rt M1 was obtained.  Pts neurologic exam has continued to improved.  Pt was noted to have worsening mentation when SBP <110 so she was maintained on светлана x 24 hours and was d/c'd this morning.  Her SBP has since been liberalized and neurologic exam continues to improved.  UA on admission noted to be positive, UCx w/ E.coli, currently on ceftriaxone.  Pt transferred to medicine SDU.  She had MRI which showed Multiple bilateral acute/subacute infarcts as described above. Patient restarted on anticoagulation. Patient is discharged to acute rehab. 89 y/o F w/ PMH of AF (Eliquis stopped 5/23 by cardiologist due to freq falls), HTN, asthma/bronchiectasis, latent TB, hypothroidism, HLD, arthritis, chronic dizziness, macular degeneration. 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  Pt received TNK and transferred to [facility] neuroICU as a code biplane.  She was emergently taken to angio for thrombectomy where TICI 3 revascularization of the Rt M1 was obtained.  Pts neurologic exam has continued to improved.  Pt was noted to have worsening mentation when SBP <110 so she was maintained on светлана x 24 hours.  Her SBP has since been liberalized and neurologic exam continues to improved. Treated for UTI with Rocephin. Pt transferred to medicine on 5/30/23.  She had MRI which showed Multiple bilateral acute/subacute infarcts. She was restarted on Eliquis. PT/OT/PM&R are recommending Acute Rehab.   Patient has Chronic Anemia. Found to have low iron stores so she was given Venofer and currently she is on Ferrous Sulfate.   During hospitalization, she has been monitored closely. No signs of active bleeding.  Today her H&H is 9.0/28.3. She is stable for discharge.

## 2023-06-02 NOTE — PROGRESS NOTE ADULT - ATTENDING COMMENTS
Patient seen and examined. Case discussed with Dr. Mills. Agree with recommendations.     Rehab/Impaired mobility and function - Patient continues to require hospitalization for the above diagnoses and ongoing active management of comorbid complications (IV HTN medications) that are substantially impairing functional ability and impairing quality of life necessitating acute rehab.    Based on the patient's diagnosis, functional status and potential for progress, continue to recommend ACUTE inpatient rehabilitation for the functional deficits consisting of 3 hours of therapy/day & 24 hour RN/daily PMR physician for comorbid medical management. Patient will be able to tolerate 3 hours a day.    Will continue to follow. Rehab recommendations are dependent on how functional progress changes as well as how patient continues to participate and tolerate therapeutic interventions, which may change. Recommend ongoing mobilization by staff to maintain cardiopulmonary function and prevention of secondary complications related to debility. Discussed the specific management and recommendations above with rehab clinical care team/rehab liaison.

## 2023-06-02 NOTE — PROGRESS NOTE ADULT - ASSESSMENT
89 y/o F w/ PMH of AF (Eliquis stopped 5/23 by cardiologist due to freq falls), HTN, asthma/bronchiectasis, latent TB, hypothroidism, HLD, arthritis, chronic dizziness, macular degeneration. 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  Pt received TNK and transferred to [facility] neuroICU as a code biplane.  She was emergently taken to angio for thrombectomy where TICI 3 revascularization of the Rt M1 was obtained.  Pts neurologic exam has continued to improved.  Pt was noted to have worsening mentation when SBP <110 so she was maintained on светлана x 24 hours and was d/c'd this morning.  Her SBP has since been liberalized and neurologic exam continues to improved.  UA on admission noted to be positive, UCx w/ E.coli, currently on ceftriaxone.  Pt transfered to medicine SDU.  She had MRI which showed Multiple bilateral acute/subacute infarcts as described above.    Acute CVA   - Likely 2/2 AF off AC   - s/p TNK and Rt M1 thrombectomy 05/29  - MRI  Multiple bilateral acute/subacute infarcts as described above.  - Clinically improving   - Neurochecks q2h  - Neurology recs appreciated  - Continue Aspirin 81mg daily and Eliquis  - Continue Zocor 10mg nightly   - s/p MBS Regular solids w/MILDLY thick liquids for dysphagia     AF   - Rate controlled   - Digoxin 125mcg daily  - EP recs appreciated  - Cardiology recs appreciated  - Continue Eliquis    Normocytic anemia   - Hemodynamically stable   - No active bleeding   - Monitor H/H and transfuse as needed    Uncomplicated UTI  - UA w/ pyuria and Ucx growing E.coli   - Completed course of Ceftriaxone on 5/31  - Leukocytosis likely from UTI and reactive s/p intervention     Hypothyroidism  - Synthroid 112mcg daily    DVT ppx  - Eliquis     Dispo; acute rehab. CM notified 89 y/o F w/ PMH of AF (Eliquis stopped 5/23 by cardiologist due to freq falls), HTN, asthma/bronchiectasis, latent TB, hypothroidism, HLD, arthritis, chronic dizziness, macular degeneration. 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  Pt received TNK and transferred to [facility] neuroICU as a code biplane.  She was emergently taken to angio for thrombectomy where TICI 3 revascularization of the Rt M1 was obtained.  Pts neurologic exam has continued to improved.  Pt was noted to have worsening mentation when SBP <110 so she was maintained on светлана x 24 hours and was d/c'd this morning.  Her SBP has since been liberalized and neurologic exam continues to improved.  UA on admission noted to be positive, UCx w/ E.coli, currently on ceftriaxone.  Pt transfered to medicine SDU.  She had MRI which showed Multiple bilateral acute/subacute infarcts as described above.    Acute CVA   - Likely 2/2 AF off AC   - s/p TNK and Rt M1 thrombectomy 05/29  - MRI  Multiple bilateral acute/subacute infarcts as described above.  - Clinically improving   - Neurochecks q2h  - Neurology recs appreciated  - Continue Aspirin 81mg daily and Eliquis  - Continue Zocor 10mg nightly   - s/p MBS Regular solids w/MILDLY thick liquids for dysphagia     AF   - Rate controlled   - Digoxin 125mcg daily  - EP recs appreciated  - Cardiology recs appreciated  - Continue Eliquis    Normocytic anemia   - Hemodynamically stable   - No active bleeding   - Monitor H/H and transfuse as needed    Uncomplicated UTI  - UA w/ pyuria and Ucx growing E.coli   - Completed course of Ceftriaxone on 5/31  - Leukocytosis likely from UTI and reactive s/p intervention     Hypothyroidism  - Synthroid 112mcg daily    DVT ppx  - Eliquis     Dispo; acute rehab. CM notified. Awaiting auth and accepting facility.

## 2023-06-02 NOTE — PROGRESS NOTE ADULT - SUBJECTIVE AND OBJECTIVE BOX
[facility] Division of Hospital Medicine    Chief Complaint:  stroke    SUBJECTIVE / OVERNIGHT EVENTS: Patient seen and examined. Asking what the next steps are now that she had MRI. She is agreeable to acute rehab. She reports feeling dizzy this am but has not eaten breakfast.     Patient denies chest pain, SOB, abd pain, N/V, fever, chills, dysuria or any other complaints. All remainder ROS negative.     MEDICATIONS  (STANDING):  apixaban 2.5 milliGRAM(s) Oral every 12 hours  aspirin  chewable 81 milliGRAM(s) Oral daily  budesonide 160 MICROgram(s)/formoterol 4.5 MICROgram(s) Inhaler 2 Puff(s) Inhalation two times a day  chlorhexidine 2% Cloths 1 Application(s) Topical daily  digoxin     Tablet 125 MICROGram(s) Oral daily  famotidine    Tablet 20 milliGRAM(s) Oral daily  ferrous    sulfate 325 milliGRAM(s) Oral daily  levothyroxine 112 MICROGram(s) Oral daily  polyethylene glycol 3350 17 Gram(s) Oral daily  senna 2 Tablet(s) Oral at bedtime  simvastatin 10 milliGRAM(s) Oral at bedtime    MEDICATIONS  (PRN):  albuterol    0.083% 2.5 milliGRAM(s) Nebulizer every 6 hours PRN Shortness of Breath and/or Wheezing  hydrALAZINE Injectable 10 milliGRAM(s) IV Push every 2 hours PRN SBP >140  labetalol Injectable 10 milliGRAM(s) IV Push every 2 hours PRN SBP >140        I&O's Summary    01 Jun 2023 07:01  -  02 Jun 2023 07:00  --------------------------------------------------------  IN: 0 mL / OUT: 1050 mL / NET: -1050 mL    02 Jun 2023 07:01  -  02 Jun 2023 11:52  --------------------------------------------------------  IN: 0 mL / OUT: 400 mL / NET: -400 mL        PHYSICAL EXAM:  Vital Signs Last 24 Hrs  T(C): 36.1 (02 Jun 2023 07:36), Max: 37.1 (02 Jun 2023 00:11)  T(F): 97 (02 Jun 2023 07:36), Max: 98.7 (02 Jun 2023 00:11)  HR: 88 (02 Jun 2023 10:40) (63 - 88)  BP: 112/70 (02 Jun 2023 10:40) (109/83 - 142/78)  BP(mean): 74 (02 Jun 2023 10:40) (62 - 89)  RR: 16 (02 Jun 2023 10:40) (15 - 22)  SpO2: 96% (02 Jun 2023 10:40) (92% - 99%)    Parameters below as of 02 Jun 2023 10:40  Patient On (Oxygen Delivery Method): room air            CONSTITUTIONAL: NAD,  ENMT: Moist oral mucosa, no pharyngeal injection or exudates; normal dentition  RESPIRATORY: Normal respiratory effort; lungs are clear to auscultation bilaterally  CARDIOVASCULAR: Regular rate and rhythm, normal S1 and S2, ; No lower extremity edema;   ABDOMEN: Nontender to palpation, normoactive bowel sounds, no rebound/guarding; No hepatosplenomegaly  MUSCLOSKELETAL:  Normal gait; no clubbing or cyanosis of digits;   PSYCH: A+O to person, place, and time; affect appropriate  NEUROLOGY: CN 2-12 are intact and symmetric; 4/5 strength in left arm and leg  SKIN: No rashes; no palpable lesions    LABS:                        8.1    9.81  )-----------( 212      ( 02 Jun 2023 04:44 )             24.8     06-02    140  |  104  |  15.7  ----------------------------<  99  4.0   |  26.0  |  0.49<L>    Ca    8.9      02 Jun 2023 04:44                CAPILLARY BLOOD GLUCOSE            RADIOLOGY & ADDITIONAL TESTS:  Results Reviewed:   Imaging Personally Reviewed:  Electrocardiogram Personally Reviewed:

## 2023-06-02 NOTE — DIETITIAN INITIAL EVALUATION ADULT - PERTINENT MEDS FT
MEDICATIONS  (STANDING):  apixaban 2.5 milliGRAM(s) Oral every 12 hours  aspirin  chewable 81 milliGRAM(s) Oral daily  budesonide 160 MICROgram(s)/formoterol 4.5 MICROgram(s) Inhaler 2 Puff(s) Inhalation two times a day  chlorhexidine 2% Cloths 1 Application(s) Topical daily  digoxin     Tablet 125 MICROGram(s) Oral daily  famotidine    Tablet 20 milliGRAM(s) Oral daily  ferrous    sulfate 325 milliGRAM(s) Oral daily  levothyroxine 112 MICROGram(s) Oral daily  polyethylene glycol 3350 17 Gram(s) Oral daily  senna 2 Tablet(s) Oral at bedtime  simvastatin 10 milliGRAM(s) Oral at bedtime    MEDICATIONS  (PRN):  albuterol    0.083% 2.5 milliGRAM(s) Nebulizer every 6 hours PRN Shortness of Breath and/or Wheezing  hydrALAZINE Injectable 10 milliGRAM(s) IV Push every 2 hours PRN SBP >140  labetalol Injectable 10 milliGRAM(s) IV Push every 2 hours PRN SBP >140

## 2023-06-02 NOTE — DISCHARGE NOTE PROVIDER - ATTENDING ATTESTATION STATEMENT
I have personally seen and examined the patient. I have collaborated with and supervised the
Ambulatory

## 2023-06-02 NOTE — DISCHARGE NOTE PROVIDER - CARE PROVIDER_API CALL
Dari Harley NP in Adult Health  [address] [place] [id number]  Phone: [phone]  Fax: [phone]  Follow Up Time: 2 weeks    PMD,   Phone: (   )    -  Fax: (   )    -  Established Patient  Follow Up Time: 1 week    Cardiologist,   Phone: (   )    -  Fax: (   )    -  Established Patient  Follow Up Time: 2 weeks

## 2023-06-02 NOTE — DIETITIAN INITIAL EVALUATION ADULT - PERTINENT LABORATORY DATA
06-02    140  |  104  |  15.7  ----------------------------<  99  4.0   |  26.0  |  0.49<L>    Ca    8.9      02 Jun 2023 04:44    A1C with Estimated Average Glucose Result: 5.7 % (05-29-23 @ 04:00)

## 2023-06-02 NOTE — DISCHARGE NOTE PROVIDER - PROVIDER TOKENS
PROVIDER:[TOKEN:[[id number]],FOLLOWUP:[2 weeks]],FREE:[LAST:[PMD],PHONE:[(   )    -],FAX:[(   )    -],FOLLOWUP:[1 week],ESTABLISHEDPATIENT:[T]],FREE:[LAST:[Cardiologist],PHONE:[(   )    -],FAX:[(   )    -],FOLLOWUP:[2 weeks],ESTABLISHEDPATIENT:[T]]

## 2023-06-02 NOTE — DISCHARGE NOTE PROVIDER - NSDCFUADDAPPT_GEN_ALL_CORE_FT
Gradually resume Ramipril and titrate to home dose 10 mg daily.    SBP goal 110-140.  Avoid hypotension.

## 2023-06-03 LAB
ALBUMIN SERPL ELPH-MCNC: 3.2 G/DL — LOW (ref 3.3–5.2)
ALP SERPL-CCNC: 61 U/L — SIGNIFICANT CHANGE UP (ref 40–120)
ALT FLD-CCNC: 24 U/L — SIGNIFICANT CHANGE UP
ANION GAP SERPL CALC-SCNC: 10 MMOL/L — SIGNIFICANT CHANGE UP (ref 5–17)
AST SERPL-CCNC: 34 U/L — HIGH
BASOPHILS # BLD AUTO: 0.03 K/UL — SIGNIFICANT CHANGE UP (ref 0–0.2)
BASOPHILS NFR BLD AUTO: 0.3 % — SIGNIFICANT CHANGE UP (ref 0–2)
BILIRUB SERPL-MCNC: 0.9 MG/DL — SIGNIFICANT CHANGE UP (ref 0.4–2)
BUN SERPL-MCNC: 21.6 MG/DL — HIGH (ref 8–20)
CALCIUM SERPL-MCNC: 8.6 MG/DL — SIGNIFICANT CHANGE UP (ref 8.4–10.5)
CHLORIDE SERPL-SCNC: 103 MMOL/L — SIGNIFICANT CHANGE UP (ref 96–108)
CO2 SERPL-SCNC: 25 MMOL/L — SIGNIFICANT CHANGE UP (ref 22–29)
CREAT SERPL-MCNC: 0.56 MG/DL — SIGNIFICANT CHANGE UP (ref 0.5–1.3)
EGFR: 86 ML/MIN/1.73M2 — SIGNIFICANT CHANGE UP
EOSINOPHIL # BLD AUTO: 0.26 K/UL — SIGNIFICANT CHANGE UP (ref 0–0.5)
EOSINOPHIL NFR BLD AUTO: 2.6 % — SIGNIFICANT CHANGE UP (ref 0–6)
GLUCOSE SERPL-MCNC: 95 MG/DL — SIGNIFICANT CHANGE UP (ref 70–99)
HCT VFR BLD CALC: 23.8 % — LOW (ref 34.5–45)
HGB BLD-MCNC: 7.6 G/DL — LOW (ref 11.5–15.5)
IMM GRANULOCYTES NFR BLD AUTO: 2.5 % — HIGH (ref 0–0.9)
LYMPHOCYTES # BLD AUTO: 1.57 K/UL — SIGNIFICANT CHANGE UP (ref 1–3.3)
LYMPHOCYTES # BLD AUTO: 15.5 % — SIGNIFICANT CHANGE UP (ref 13–44)
MAGNESIUM SERPL-MCNC: 2 MG/DL — SIGNIFICANT CHANGE UP (ref 1.6–2.6)
MCHC RBC-ENTMCNC: 26.4 PG — LOW (ref 27–34)
MCHC RBC-ENTMCNC: 31.9 GM/DL — LOW (ref 32–36)
MCV RBC AUTO: 82.6 FL — SIGNIFICANT CHANGE UP (ref 80–100)
MONOCYTES # BLD AUTO: 1.02 K/UL — HIGH (ref 0–0.9)
MONOCYTES NFR BLD AUTO: 10.1 % — SIGNIFICANT CHANGE UP (ref 2–14)
NEUTROPHILS # BLD AUTO: 7.01 K/UL — SIGNIFICANT CHANGE UP (ref 1.8–7.4)
NEUTROPHILS NFR BLD AUTO: 69 % — SIGNIFICANT CHANGE UP (ref 43–77)
PLATELET # BLD AUTO: 218 K/UL — SIGNIFICANT CHANGE UP (ref 150–400)
POTASSIUM SERPL-MCNC: 4.3 MMOL/L — SIGNIFICANT CHANGE UP (ref 3.5–5.3)
POTASSIUM SERPL-SCNC: 4.3 MMOL/L — SIGNIFICANT CHANGE UP (ref 3.5–5.3)
PROT SERPL-MCNC: 5.6 G/DL — LOW (ref 6.6–8.7)
RBC # BLD: 2.88 M/UL — LOW (ref 3.8–5.2)
RBC # FLD: 15.9 % — HIGH (ref 10.3–14.5)
SARS-COV-2 RNA SPEC QL NAA+PROBE: SIGNIFICANT CHANGE UP
SODIUM SERPL-SCNC: 138 MMOL/L — SIGNIFICANT CHANGE UP (ref 135–145)
WBC # BLD: 10.14 K/UL — SIGNIFICANT CHANGE UP (ref 3.8–10.5)
WBC # FLD AUTO: 10.14 K/UL — SIGNIFICANT CHANGE UP (ref 3.8–10.5)

## 2023-06-03 PROCEDURE — 99232 SBSQ HOSP IP/OBS MODERATE 35: CPT

## 2023-06-03 RX ORDER — BACITRACIN ZINC 500 UNIT/G
1 OINTMENT IN PACKET (EA) TOPICAL
Refills: 0 | Status: DISCONTINUED | OUTPATIENT
Start: 2023-06-03 | End: 2023-06-06

## 2023-06-03 RX ADMIN — POLYETHYLENE GLYCOL 3350 17 GRAM(S): 17 POWDER, FOR SOLUTION ORAL at 09:29

## 2023-06-03 RX ADMIN — Medication 81 MILLIGRAM(S): at 09:29

## 2023-06-03 RX ADMIN — Medication 325 MILLIGRAM(S): at 09:28

## 2023-06-03 RX ADMIN — CHLORHEXIDINE GLUCONATE 1 APPLICATION(S): 213 SOLUTION TOPICAL at 05:00

## 2023-06-03 RX ADMIN — FAMOTIDINE 20 MILLIGRAM(S): 10 INJECTION INTRAVENOUS at 09:28

## 2023-06-03 RX ADMIN — Medication 125 MICROGRAM(S): at 05:01

## 2023-06-03 RX ADMIN — APIXABAN 2.5 MILLIGRAM(S): 2.5 TABLET, FILM COATED ORAL at 05:01

## 2023-06-03 RX ADMIN — SENNA PLUS 2 TABLET(S): 8.6 TABLET ORAL at 23:00

## 2023-06-03 RX ADMIN — Medication 112 MICROGRAM(S): at 05:00

## 2023-06-03 RX ADMIN — BUDESONIDE AND FORMOTEROL FUMARATE DIHYDRATE 2 PUFF(S): 160; 4.5 AEROSOL RESPIRATORY (INHALATION) at 20:47

## 2023-06-03 RX ADMIN — BUDESONIDE AND FORMOTEROL FUMARATE DIHYDRATE 2 PUFF(S): 160; 4.5 AEROSOL RESPIRATORY (INHALATION) at 09:26

## 2023-06-03 RX ADMIN — APIXABAN 2.5 MILLIGRAM(S): 2.5 TABLET, FILM COATED ORAL at 17:00

## 2023-06-03 RX ADMIN — SIMVASTATIN 10 MILLIGRAM(S): 20 TABLET, FILM COATED ORAL at 23:00

## 2023-06-03 RX ADMIN — ALBUTEROL 2.5 MILLIGRAM(S): 90 AEROSOL, METERED ORAL at 17:00

## 2023-06-03 RX ADMIN — Medication 1 APPLICATION(S): at 23:04

## 2023-06-03 NOTE — PROGRESS NOTE ADULT - ASSESSMENT
89 y/o F w/ PMH of AF (Eliquis stopped 5/23 by cardiologist due to freq falls), HTN, asthma/bronchiectasis, latent TB, hypothroidism, HLD, arthritis, chronic dizziness, macular degeneration. 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  Pt received TNK and transferred to [facility] neuroICU as a code biplane.  She was emergently taken to angio for thrombectomy where TICI 3 revascularization of the Rt M1 was obtained.  Pts neurologic exam has continued to improved.  Pt was noted to have worsening mentation when SBP <110 so she was maintained on светлана x 24 hours.  Her SBP has since been liberalized and neurologic exam continues to improved.  Treated for UTI with Rocephin. Pt transferred to medicine on 5/30/23.  She had MRI which showed Multiple bilateral acute/subacute infarcts.    1) Acute CVA   - Likely embolic   - s/p TNK and Rt M1 thrombectomy 05/29  - MRI  Multiple bilateral acute/subacute infarcts   - Clinically improving   - Neurochecks q4h  - Continue Aspirin 81mg daily and Eliquis  - Continue Zocor 10mg nightly   - s/p MBS Regular solids w/MILDLY thick liquids for dysphagia   - Neurology recs appreciated    2) Chronic A. Fib   - Rate controlled   - PPM interrogated    - Digoxin 125mcg daily  - Continue Eliquis  - Cardiology recs appreciated    3) Chronic Anemia   - Hemodynamically stable   - No active bleeding   - Monitor H/H and transfuse as needed  - Low iron stores, s/p Venofer. continue Ferrous Sulfate.     4) Uncomplicated UTI  - Urine culture with E. Coli   - s/p Rocephin     5) Hypothyroidism  - Synthroid 112mcg daily    DVT Prophylaxis -- Patient is on Eliquis     Dispo: Acute Rehab pending auth.

## 2023-06-03 NOTE — PROGRESS NOTE ADULT - SUBJECTIVE AND OBJECTIVE BOX
Chief complaint: Patient with anxiety, depression, mood instability, difficulty concentrating and substance abuse    Current status: The patient notes that the PHP is emotionally draining but very helpful for improving his coping skills. He notes his mood has been stable. He notes no problems with concentration in the programming. He is going to be tested for sleep apnea because his wife tells him he snores a lot and stops breathing for long periods during the night. He does feel tired a lot during the day. He notes he plans to start exercising more to improve his sleeping at night. He also is trying to lose weight and reports losing 15 pounds recently.    Mental status exam: The patient is well-groomed and has a steady gait. He is alert, oriented ×3 with intact recent and remote memory. Normal attention span. Insight and judgment intact. Speech normal rate. Affect animated and mood euthymic. Normal rate of thoughts. No negativity or suicidal ideation.    Assessment: Axis I: Bipolar 2 disorder, DANIELA, ADHD, substance abuse Axis II: None Axis III: See problem list Axis IV: Mild to moderate Axis V: 50    Plan: Continue PHP   Cerebral infarction    HPI:  89F with PMH afib recently taken off AC who presented with R gaze deviation and L sided weakness. Last known well 1900, was found by  at 2100 with symptoms. [facility], code stroke initiated, NIH 26, found to have RM1 occlusion on CTA. Patient was given TNK at 23:33 and was transferred to [facility] for mechanical thrombectomy. On arrival, patient aphasic, unable to perform ROS.     NIH 26, mRS 0 on admission (29 May 2023 01:09)    Interval History:  Patient was seen and examined at bedside around 10:45 am.  Left sided weakness is improving.   Has some difficulty taking deep breaths.   Denies cough, chest pain, palpitations, headache, dizziness, visual symptoms, nausea, vomiting or abdominal pain.    ROS:  As per interval history otherwise unremarkable.    PHYSICAL EXAM:  Vital Signs   T(C): 36.8 (03 Jun 2023 15:30), Max: 36.8 (03 Jun 2023 15:30)  T(F): 98.3 (03 Jun 2023 15:30), Max: 98.3 (03 Jun 2023 15:30)  HR: 83 (03 Jun 2023 15:30) (67 - 94)  BP: 123/69 (03 Jun 2023 15:30) (109/43 - 148/64)  BP(mean): 60 (03 Jun 2023 14:00) (57 - 83)  RR: 18 (03 Jun 2023 15:30) (18 - 22)  SpO2: 99% (03 Jun 2023 15:30) (95% - 100%)  Parameters below as of 03 Jun 2023 15:30  Patient On (Oxygen Delivery Method): room air  General: Elderly female sitting in chair comfortably. No acute distress  HEENT: EOMI. Clear conjunctivae. Moist mucus membrane  Neck: Supple.   Chest: CTA bilaterally. No wheezing, rales or rhonchi.   Heart: S1 & S2 with irregular rhythm.   Abdomen: Non distended. Soft. Non-tender. + BS  Ext: No pedal edema. No calf tenderness. Skin tears in right groin and left leg. Ecchymosis on both legs.    Neuro: Active and alert. Mild facial asymmetry. Motor 5/5 in RUE/RLE & 4-5/5 in LUE/LLE. Sensation intact. Reflexes 1+. Speech clear.   Skin: Warm and Dry  Psychiatry: Normal mood and affect    I&O's Summary    02 Jun 2023 07:01  -  03 Jun 2023 07:00  --------------------------------------------------------  IN: 0 mL / OUT: 1375 mL / NET: -1375 mL    03 Jun 2023 07:01  -  03 Jun 2023 16:43  --------------------------------------------------------  IN: 0 mL / OUT: 650 mL / NET: -650 mL    LABS:                      7.6    10.14 )-----------( 218      ( 03 Jun 2023 06:51 )             23.8     06-03    138  |  103  |  21.6<H>  ----------------------------<  95  4.3   |  25.0  |  0.56    Ca    8.6      03 Jun 2023 06:51  Mg     2.0     06-03    TPro  5.6<L>  /  Alb  3.2<L>  /  TBili  0.9  /  DBili  x   /  AST  34<H>  /  ALT  24  /  AlkPhos  61  06-03    RADIOLOGY & ADDITIONAL STUDIES:  Reviewed     MEDICATIONS  (STANDING):  apixaban 2.5 milliGRAM(s) Oral every 12 hours  aspirin  chewable 81 milliGRAM(s) Oral daily  bacitracin   Ointment 1 Application(s) Topical two times a day  budesonide 160 MICROgram(s)/formoterol 4.5 MICROgram(s) Inhaler 2 Puff(s) Inhalation two times a day  chlorhexidine 2% Cloths 1 Application(s) Topical daily  digoxin     Tablet 125 MICROGram(s) Oral daily  famotidine    Tablet 20 milliGRAM(s) Oral daily  ferrous    sulfate 325 milliGRAM(s) Oral daily  levothyroxine 112 MICROGram(s) Oral daily  polyethylene glycol 3350 17 Gram(s) Oral daily  senna 2 Tablet(s) Oral at bedtime  simvastatin 10 milliGRAM(s) Oral at bedtime    MEDICATIONS  (PRN):  albuterol    0.083% 2.5 milliGRAM(s) Nebulizer every 6 hours PRN Shortness of Breath and/or Wheezing  hydrALAZINE Injectable 10 milliGRAM(s) IV Push every 2 hours PRN SBP >140  labetalol Injectable 10 milliGRAM(s) IV Push every 2 hours PRN SBP >140       Cerebral infarction    HPI:  89F with PMH afib recently taken off AC who presented with R gaze deviation and L sided weakness. Last known well 1900, was found by  at 2100 with symptoms. Patient taken to [facility], code stroke initiated, NIH 26, found to have RM1 occlusion on CTA. Patient was given TNK at 23:33 and was transferred to [facility] for mechanical thrombectomy. On arrival, patient aphasic, unable to perform ROS.     NIH 26, mRS 0 on admission (29 May 2023 01:09)    Interval History:  Patient was seen and examined at bedside around 10:45 am.  Left sided weakness is improving.   Has some difficulty taking deep breaths.   Denies cough, chest pain, palpitations, headache, dizziness, visual symptoms, nausea, vomiting or abdominal pain.    ROS:  As per interval history otherwise unremarkable.    PHYSICAL EXAM:  Vital Signs   T(C): 36.8 (03 Jun 2023 15:30), Max: 36.8 (03 Jun 2023 15:30)  T(F): 98.3 (03 Jun 2023 15:30), Max: 98.3 (03 Jun 2023 15:30)  HR: 83 (03 Jun 2023 15:30) (67 - 94)  BP: 123/69 (03 Jun 2023 15:30) (109/43 - 148/64)  BP(mean): 60 (03 Jun 2023 14:00) (57 - 83)  RR: 18 (03 Jun 2023 15:30) (18 - 22)  SpO2: 99% (03 Jun 2023 15:30) (95% - 100%)  Parameters below as of 03 Jun 2023 15:30  Patient On (Oxygen Delivery Method): room air  General: Elderly female sitting in chair comfortably. No acute distress  HEENT: EOMI. Clear conjunctivae. Moist mucus membrane  Neck: Supple.   Chest: CTA bilaterally. No wheezing, rales or rhonchi.   Heart: S1 & S2 with irregular rhythm.   Abdomen: Non distended. Soft. Non-tender. + BS  Ext: No pedal edema. No calf tenderness. Skin tears in right groin and left leg. Ecchymosis on both legs.    Neuro: Active and alert. Mild facial asymmetry. Motor 5/5 in RUE/RLE & 4-5/5 in LUE/LLE. Sensation intact. Reflexes 1+. Dysarthria.   Skin: Warm and Dry  Psychiatry: Normal mood and affect    I&O's Summary    02 Jun 2023 07:01  -  03 Jun 2023 07:00  --------------------------------------------------------  IN: 0 mL / OUT: 1375 mL / NET: -1375 mL    03 Jun 2023 07:01  -  03 Jun 2023 16:43  --------------------------------------------------------  IN: 0 mL / OUT: 650 mL / NET: -650 mL    LABS:                      7.6    10.14 )-----------( 218      ( 03 Jun 2023 06:51 )             23.8     06-03    138  |  103  |  21.6<H>  ----------------------------<  95  4.3   |  25.0  |  0.56    Ca    8.6      03 Jun 2023 06:51  Mg     2.0     06-03    TPro  5.6<L>  /  Alb  3.2<L>  /  TBili  0.9  /  DBili  x   /  AST  34<H>  /  ALT  24  /  AlkPhos  61  06-03    RADIOLOGY & ADDITIONAL STUDIES:  Reviewed     MEDICATIONS  (STANDING):  apixaban 2.5 milliGRAM(s) Oral every 12 hours  aspirin  chewable 81 milliGRAM(s) Oral daily  bacitracin   Ointment 1 Application(s) Topical two times a day  budesonide 160 MICROgram(s)/formoterol 4.5 MICROgram(s) Inhaler 2 Puff(s) Inhalation two times a day  chlorhexidine 2% Cloths 1 Application(s) Topical daily  digoxin     Tablet 125 MICROGram(s) Oral daily  famotidine    Tablet 20 milliGRAM(s) Oral daily  ferrous    sulfate 325 milliGRAM(s) Oral daily  levothyroxine 112 MICROGram(s) Oral daily  polyethylene glycol 3350 17 Gram(s) Oral daily  senna 2 Tablet(s) Oral at bedtime  simvastatin 10 milliGRAM(s) Oral at bedtime    MEDICATIONS  (PRN):  albuterol    0.083% 2.5 milliGRAM(s) Nebulizer every 6 hours PRN Shortness of Breath and/or Wheezing  hydrALAZINE Injectable 10 milliGRAM(s) IV Push every 2 hours PRN SBP >140  labetalol Injectable 10 milliGRAM(s) IV Push every 2 hours PRN SBP >140

## 2023-06-04 PROCEDURE — 99232 SBSQ HOSP IP/OBS MODERATE 35: CPT

## 2023-06-04 RX ADMIN — Medication 1 APPLICATION(S): at 16:56

## 2023-06-04 RX ADMIN — Medication 125 MICROGRAM(S): at 06:22

## 2023-06-04 RX ADMIN — Medication 81 MILLIGRAM(S): at 08:01

## 2023-06-04 RX ADMIN — APIXABAN 2.5 MILLIGRAM(S): 2.5 TABLET, FILM COATED ORAL at 16:56

## 2023-06-04 RX ADMIN — APIXABAN 2.5 MILLIGRAM(S): 2.5 TABLET, FILM COATED ORAL at 06:22

## 2023-06-04 RX ADMIN — POLYETHYLENE GLYCOL 3350 17 GRAM(S): 17 POWDER, FOR SOLUTION ORAL at 08:01

## 2023-06-04 RX ADMIN — FAMOTIDINE 20 MILLIGRAM(S): 10 INJECTION INTRAVENOUS at 08:01

## 2023-06-04 RX ADMIN — Medication 325 MILLIGRAM(S): at 08:01

## 2023-06-04 RX ADMIN — SENNA PLUS 2 TABLET(S): 8.6 TABLET ORAL at 21:51

## 2023-06-04 RX ADMIN — Medication 112 MICROGRAM(S): at 06:22

## 2023-06-04 RX ADMIN — CHLORHEXIDINE GLUCONATE 1 APPLICATION(S): 213 SOLUTION TOPICAL at 06:31

## 2023-06-04 RX ADMIN — SIMVASTATIN 10 MILLIGRAM(S): 20 TABLET, FILM COATED ORAL at 21:51

## 2023-06-04 RX ADMIN — Medication 10 MILLIGRAM(S): at 22:00

## 2023-06-04 RX ADMIN — Medication 1 APPLICATION(S): at 06:22

## 2023-06-04 NOTE — PROGRESS NOTE ADULT - SUBJECTIVE AND OBJECTIVE BOX
Cerebral infarction    HPI:  89F with PMH afib recently taken off AC who presented with R gaze deviation and L sided weakness. Last known well 1900, was found by  at 2100 with symptoms. Patient taken to [facility], code stroke initiated, NIH 26, found to have RM1 occlusion on CTA. Patient was given TNK at 23:33 and was transferred to [facility] for mechanical thrombectomy. On arrival, patient aphasic, unable to perform ROS.     NIH 26, mRS 0 on admission (29 May 2023 01:09)    Interval History:  Patient was seen and examined at bedside around 11:15 am.  Left sided weakness is improving. Wants to do more physical therapy. Discussed with RN to get her out of bed to chair so she can do exercises while sitting in chair.   Denies cough, chest pain, palpitations, shortness of breath, headache, dizziness, visual symptoms, nausea, vomiting or abdominal pain.    ROS:  As per interval history otherwise unremarkable.    PHYSICAL EXAM:  Vital Signs   T(C): 36.4 (04 Jun 2023 15:55), Max: 36.8 (04 Jun 2023 08:38)  T(F): 97.6 (04 Jun 2023 15:55), Max: 98.2 (04 Jun 2023 08:38)  HR: 93 (04 Jun 2023 15:55) (66 - 96)  BP: 138/67 (04 Jun 2023 15:55) (124/74 - 139/69)  RR: 18 (04 Jun 2023 15:55) (17 - 20)  SpO2: 95% (04 Jun 2023 15:55) (95% - 98%)  Parameters below as of 04 Jun 2023 15:55  Patient On (Oxygen Delivery Method): room air  General: Elderly female sitting in bed comfortably. No acute distress  HEENT: EOMI. Clear conjunctivae. Moist mucus membrane  Neck: Supple.   Chest: CTA bilaterally. No wheezing, rales or rhonchi.   Heart: S1 & S2 with irregular rhythm.   Abdomen: Non distended. Soft. Non-tender. + BS  Ext: No pedal edema. No calf tenderness. Skin tears in right groin and left leg. Ecchymosis on both legs.    Neuro: Active and alert. Mild facial asymmetry. Motor 5/5 in RUE/RLE & 4-5/5 in LUE/LLE. Sensation intact. Reflexes 1+. Slurred speech.   Skin: Warm and Dry  Psychiatry: Normal mood and affect    I&O's Summary    03 Jun 2023 07:01  -  04 Jun 2023 07:00  --------------------------------------------------------  IN: 0 mL / OUT: 1600 mL / NET: -1600 mL    LABS:                      7.6    10.14 )-----------( 218      ( 03 Jun 2023 06:51 )             23.8     06-03    138  |  103  |  21.6<H>  ----------------------------<  95  4.3   |  25.0  |  0.56    Ca    8.6      03 Jun 2023 06:51  Mg     2.0     06-03    TPro  5.6<L>  /  Alb  3.2<L>  /  TBili  0.9  /  DBili  x   /  AST  34<H>  /  ALT  24  /  AlkPhos  61  06-03    RADIOLOGY & ADDITIONAL STUDIES:  Reviewed     MEDICATIONS  (STANDING):  apixaban 2.5 milliGRAM(s) Oral every 12 hours  aspirin  chewable 81 milliGRAM(s) Oral daily  bacitracin   Ointment 1 Application(s) Topical two times a day  budesonide 160 MICROgram(s)/formoterol 4.5 MICROgram(s) Inhaler 2 Puff(s) Inhalation two times a day  chlorhexidine 2% Cloths 1 Application(s) Topical daily  digoxin     Tablet 125 MICROGram(s) Oral daily  famotidine    Tablet 20 milliGRAM(s) Oral daily  ferrous    sulfate 325 milliGRAM(s) Oral daily  levothyroxine 112 MICROGram(s) Oral daily  polyethylene glycol 3350 17 Gram(s) Oral daily  senna 2 Tablet(s) Oral at bedtime  simvastatin 10 milliGRAM(s) Oral at bedtime    MEDICATIONS  (PRN):  albuterol    0.083% 2.5 milliGRAM(s) Nebulizer every 6 hours PRN Shortness of Breath and/or Wheezing  hydrALAZINE Injectable 10 milliGRAM(s) IV Push every 2 hours PRN SBP >140  labetalol Injectable 10 milliGRAM(s) IV Push every 2 hours PRN SBP >140

## 2023-06-04 NOTE — PROGRESS NOTE ADULT - ASSESSMENT
89 y/o F w/ PMH of AF (Eliquis stopped 5/23 by cardiologist due to freq falls), HTN, asthma/bronchiectasis, latent TB, hypothroidism, HLD, arthritis, chronic dizziness, macular degeneration. leadless pacemaker for SSS (placed 6/7/22), who presented on 5/28 to [facility] [place] with aphasia and LUE weakness and found to have a Rt M1 occlusion.  Pt received TNK and transferred to [facility] neuroICU as a code biplane.  She was emergently taken to angio for thrombectomy where TICI 3 revascularization of the Rt M1 was obtained.  Pts neurologic exam has continued to improved.  Pt was noted to have worsening mentation when SBP <110 so she was maintained on светлана x 24 hours.  Her SBP has since been liberalized and neurologic exam continues to improved.  Treated for UTI with Rocephin. Pt transferred to medicine on 5/30/23.  She had MRI which showed Multiple bilateral acute/subacute infarcts.    1) Acute CVA   - Likely embolic   - s/p TNK and Rt M1 thrombectomy 05/29  - MRI with multiple bilateral acute/subacute infarcts   - Clinically improving   - Neurochecks q4h  - Continue Aspirin 81mg daily and Eliquis  - Continue Zocor 10mg nightly   - s/p MBS Regular solids w/Mildly thick liquids   - Neurology recs appreciated    2) Chronic A. Fib   - Rate controlled   - PPM interrogated    - Digoxin 125mcg daily  - Continue Eliquis  - Cardiology recs appreciated    3) Chronic Anemia   - Hemodynamically stable   - No active bleeding   - Monitor H/H and transfuse as needed  - Low iron stores, s/p Venofer. Continue Ferrous Sulfate.     4) Uncomplicated UTI  - Urine culture with E. Coli   - s/p Rocephin     5) Hypothyroidism  - Synthroid 112mcg daily    DVT Prophylaxis -- Patient is on Eliquis     Dispo: Acute Rehab pending auth.

## 2023-06-05 PROCEDURE — 99233 SBSQ HOSP IP/OBS HIGH 50: CPT

## 2023-06-05 PROCEDURE — 99232 SBSQ HOSP IP/OBS MODERATE 35: CPT

## 2023-06-05 RX ORDER — ASCORBIC ACID 60 MG
500 TABLET,CHEWABLE ORAL DAILY
Refills: 0 | Status: DISCONTINUED | OUTPATIENT
Start: 2023-06-05 | End: 2023-06-06

## 2023-06-05 RX ORDER — METOPROLOL TARTRATE 50 MG
25 TABLET ORAL DAILY
Refills: 0 | Status: DISCONTINUED | OUTPATIENT
Start: 2023-06-05 | End: 2023-06-05

## 2023-06-05 RX ORDER — AMLODIPINE BESYLATE 2.5 MG/1
5 TABLET ORAL DAILY
Refills: 0 | Status: DISCONTINUED | OUTPATIENT
Start: 2023-06-05 | End: 2023-06-06

## 2023-06-05 RX ORDER — HYDRALAZINE HCL 50 MG
10 TABLET ORAL EVERY 6 HOURS
Refills: 0 | Status: DISCONTINUED | OUTPATIENT
Start: 2023-06-05 | End: 2023-06-06

## 2023-06-05 RX ADMIN — Medication 81 MILLIGRAM(S): at 08:34

## 2023-06-05 RX ADMIN — AMLODIPINE BESYLATE 5 MILLIGRAM(S): 2.5 TABLET ORAL at 17:19

## 2023-06-05 RX ADMIN — Medication 125 MICROGRAM(S): at 05:39

## 2023-06-05 RX ADMIN — Medication 325 MILLIGRAM(S): at 08:34

## 2023-06-05 RX ADMIN — Medication 10 MILLIGRAM(S): at 05:39

## 2023-06-05 RX ADMIN — APIXABAN 2.5 MILLIGRAM(S): 2.5 TABLET, FILM COATED ORAL at 05:40

## 2023-06-05 RX ADMIN — FAMOTIDINE 20 MILLIGRAM(S): 10 INJECTION INTRAVENOUS at 08:34

## 2023-06-05 RX ADMIN — Medication 25 MILLIGRAM(S): at 10:55

## 2023-06-05 RX ADMIN — POLYETHYLENE GLYCOL 3350 17 GRAM(S): 17 POWDER, FOR SOLUTION ORAL at 08:34

## 2023-06-05 RX ADMIN — SENNA PLUS 2 TABLET(S): 8.6 TABLET ORAL at 21:02

## 2023-06-05 RX ADMIN — Medication 1 APPLICATION(S): at 17:19

## 2023-06-05 RX ADMIN — CHLORHEXIDINE GLUCONATE 1 APPLICATION(S): 213 SOLUTION TOPICAL at 05:40

## 2023-06-05 RX ADMIN — SIMVASTATIN 10 MILLIGRAM(S): 20 TABLET, FILM COATED ORAL at 21:02

## 2023-06-05 RX ADMIN — Medication 1 APPLICATION(S): at 05:39

## 2023-06-05 RX ADMIN — APIXABAN 2.5 MILLIGRAM(S): 2.5 TABLET, FILM COATED ORAL at 17:19

## 2023-06-05 RX ADMIN — Medication 112 MICROGRAM(S): at 05:39

## 2023-06-05 NOTE — CHART NOTE - NSCHARTNOTEFT_GEN_A_CORE
Source: Patient [X ]  Family [ ]   other [ ]  89 y/o F w/ PMH of AF (Eliquis stopped 5/23 by cardiologist due to freq falls), HTN, asthma/bronchiectasis, latent TB, hypothroidism, HLD, arthritis, chronic dizziness, macular degeneration. leadless pacemaker for SSS (placed 6/7/22), who presented on 5/28 to [facility] with aphasia and LUE weakness and found to have a Rt M1 occlusion.  Pt received TNK and transferred to [facility] neuroICU as a code biplane.  She was emergently taken to angio for thrombectomy where TICI 3 revascularization of the Rt M1 was obtained.  Pts neurologic exam has continued to improved.  Pt was noted to have worsening mentation when SBP <110 so she was maintained on светлана x 24 hours.  Her SBP has since been liberalized and neurologic exam continues to improved.  Treated for UTI with Rocephin. Pt transferred to medicine on 5/30/23.  She had MRI which showed Multiple bilateral acute/subacute infarcts.    Current Diet: Diet, DASH/TLC:   Sodium & Cholesterol Restricted  Mildly Thick Liquids (MILDTHICKLIQS) (05-31-23 @ 10:05)  NO MEAT* update preferences     PO intake:  < 50% [ ]   50-75%  [X ]   %  [ ]  other :    Source for PO intake [X ] Patient [ ] family [X ] chart [ ] staff [ ] other    Current Weight:   (5/29) 117.5lbs   Pt sitting in chair in room, unable to obtain bed scale weight     % Weight Change +3 left knee edema effecting weight status     Pertinent Medications: MEDICATIONS  (STANDING):  apixaban 2.5 milliGRAM(s) Oral every 12 hours  aspirin  chewable 81 milliGRAM(s) Oral daily  bacitracin   Ointment 1 Application(s) Topical two times a day  budesonide 160 MICROgram(s)/formoterol 4.5 MICROgram(s) Inhaler 2 Puff(s) Inhalation two times a day  chlorhexidine 2% Cloths 1 Application(s) Topical daily  digoxin     Tablet 125 MICROGram(s) Oral daily  famotidine    Tablet 20 milliGRAM(s) Oral daily  ferrous    sulfate 325 milliGRAM(s) Oral daily  levothyroxine 112 MICROGram(s) Oral daily  metoprolol succinate ER 25 milliGRAM(s) Oral daily  polyethylene glycol 3350 17 Gram(s) Oral daily  senna 2 Tablet(s) Oral at bedtime  simvastatin 10 milliGRAM(s) Oral at bedtime    MEDICATIONS  (PRN):  albuterol    0.083% 2.5 milliGRAM(s) Nebulizer every 6 hours PRN Shortness of Breath and/or Wheezing    Skin: (6/3 labs) low H/H, elevated BUN     Nutrition focused physical exam conducted - found signs of malnutrition [X ]absent [ ]present    Subcutaneous fat loss: [ ] Orbital fat pads region, [ ]Buccal fat region, [ ]Triceps region,  [ ]Ribs region    Muscle wasting: [ ]Temples region, [ ]Clavicle region, [ ]Shoulder region, [ ]Scapula region, [ ]Interosseous region,  [ ]thigh region, [ ]Calf region    Estimated Needs:   [X ] no change since previous assessment  [ ] recalculated:     Current Nutrition Diagnosis: Pt remains at nutrition risk secondary to Inadequate Oral Intake related to acute illness, advanced age as evidenced by breakfast >50% consumed. Pt states she does not eat meat, will update preferences and provide eggs/yogurt as breakfast source of protein. Pt provided a menu and reviewed ordering, would benefit from menu assist. No complaints of N/V/D/C. Education not appropriate at this time. RD to remain available.     Recommendations:   Ensure BID (mildly thickened) +700cal, 40g pro/day   Rx: MVI, Vit C 500 mg daily  Monitor po intake and weight trends    Monitoring and Evaluation:   [X ] PO intake [X ] Tolerance to diet prescription [X] Weights  [X] Follow up per protocol [X] Labs: CBC, CMP

## 2023-06-05 NOTE — PROGRESS NOTE ADULT - SUBJECTIVE AND OBJECTIVE BOX
Patient is anxious about her    FUNCTIONAL PROGRESS      VITALS  T(C): 36.8 (06-05-23 @ 04:39), Max: 36.8 (06-04-23 @ 08:38)  HR: 88 (06-05-23 @ 04:39) (83 - 96)  BP: 155/59 (06-05-23 @ 04:39) (124/74 - 173/71)  RR: 18 (06-05-23 @ 04:39) (18 - 18)  SpO2: 95% (06-05-23 @ 04:39) (95% - 98%)  Wt(kg): --    MEDICATIONS   albuterol    0.083% 2.5 milliGRAM(s) every 6 hours PRN  apixaban 2.5 milliGRAM(s) every 12 hours  aspirin  chewable 81 milliGRAM(s) daily  bacitracin   Ointment 1 Application(s) two times a day  budesonide 160 MICROgram(s)/formoterol 4.5 MICROgram(s) Inhaler 2 Puff(s) two times a day  chlorhexidine 2% Cloths 1 Application(s) daily  digoxin     Tablet 125 MICROGram(s) daily  famotidine    Tablet 20 milliGRAM(s) daily  ferrous    sulfate 325 milliGRAM(s) daily  levothyroxine 112 MICROGram(s) daily  metoprolol succinate ER 25 milliGRAM(s) daily  polyethylene glycol 3350 17 Gram(s) daily  senna 2 Tablet(s) at bedtime  simvastatin 10 milliGRAM(s) at bedtime      RECENT LABS/IMAGING  - Reviewed Today                   Patient is anxious about her elevated BP.  Provided emotional support.  Gave her exercises to focus on.     FUNCTIONAL PROGRESS  6/3 PT  Sit-Stand Transfer Training  Sit-to-Stand Transfer Training Rehab Potential: good, to achieve stated therapy goals  Transfer Training Sit-to-Stand Transfer: minimum assist (75% patient effort);  1 person assist;  full weight-bearing   rolling walker  Transfer Training Stand-to-Sit Transfer: minimum assist (75% patient effort);  1 person assist;  full weight-bearing   rolling walker  Sit-to-Stand Transfer Training Transfer Safety Analysis: decreased strength;  impaired balance    Gait Training  Gait Training Rehab Potential: good, to achieve stated therapy goals  Gait Training: minimum assist (75% patient effort);  1 person assist;  full weight-bearing   rolling walker;  40 feet;  verbal cues;  verbal cues for hand placement and for patient to keep FILEMON inside RW at all times  Gait Analysis: 2-point gait   shuffling;  decreased step length;  decreased stride length;  NBOS;  decreased hip/knee flexion;  decreased strength;  impaired balance     6/3 OT  Toilet Transfer Training  Transfer Training Toilet Transfer: minimum assist (75% patient effort);  1 person assist;  verbal cues;  weight-bearing as tolerated   rolling walker;  commode;  bedside commode. Cues for sequencing of movement and for safety for proper hand placement prior to/during transfer + assist with Left hand    Toilet Transfer Training Transfer Safety Analysis: decreased weight-shifting ability;  decreased balance;  decreased ROM;  decreased strength;  impaired balance;  impaired coordination;  impaired motor control;  impaired postural control;  cognitive, decreased safety awareness;  rolling walker;  commode    Functional Endurance  Functional Endurance Detail: Pt ambulated with RW and min A x 1, +external cues short distances around bed area due to decreased balance and strength. Pt requires assist for negotiating RW around obstacles and with turns/tight spaces +assist for left hand . Pt educated in energy conservation techniques including proper breathing and activity pacing.     Lower Body Dressing Training  Lower Body Dressing Training Assistance: moderate assist (50% patient effort);  1 person assist;  verbal cues;  decreased ROM;  decreased flexibility;  decreased strength;  impaired balance;  impaired motor control;  impaired coordination;  impaired postural control;  cognitive, decreased safety awareness    Upper Body Dressing Training  Upper Body Dressing Training Assistance: moderate assist (50% patient effort);  1 person assist;  verbal cues;  seated;  decreased strength;  decreased ROM;  impaired balance;  impaired coordination;  impaired motor control;  impaired postural control;  cognitive, decreased safety awareness    6/1 SLP MBS  Speech Language Pathology Recommendations: 1. Regular diet, MILDLY thick liquids2. CHIN TUCK POSTURE FOR ALL PO3. 1:1 assist for PO4. Strict aspiration precautions 5. Upright for PO, slow rate, small bites/sips, alternate solids/liquids, oral care6. Oral care  7. SLP to follow as schedule allows     5/30 SLP   Clinical Impression and Recommendations:   · Diagnosis	Pt's receptive language skills assessed to be WFL marked by ability to follow simple and complex commands, answer complex yes/no questions, and ID objects/pictures F:4. Pt presents w/ mild expressive language deficits characterized by slight word finding difficulties in structured fluency tasks however, question baseline versus true expressive language deficits from CVA. Pt able to express all wants/needs, participate in conversation, & complete automatic speech tasks, sentence formation and confrontation naming tasks w/ 100% accuracy independently.  · Criteria for Skilled Therapeutic Interventions Met	skilled criteria for intervention met  · Therapy Frequency	as schedule permits    Behavioral Exam:   · Behavioral Observations	within functional limits  · Visual Perception and Processing	intact    Auditory Comprehension:   · Able to Identify Objects	within functional limits  · field of 4 or more	yes  100%  · field of 4 or more	yes  100%  · Answers Yes/No Questions	within functional limits  · complex	yes  100%  · 3-step	yes  100%  · Discourse	intact  · Right/Left Discrimination	intact  · Body Part ID	intact  · Open Ended Questions	intact    Verbal Expression:   · Automatic Speech	intact; days of the week; months of the year  · Confrontational Naming	intact  100%  · Repetition	intact  · Fluency	impaired, reduced organization of task, +word finding deficits, 3WPM.  · Conversational Speech	WFL    Cognitive Linguistic Status Examination:   · Attention	intact  · Short Term Memory	impaired  Pt reports difficulty w/ short term memory, noted within structured STM tasks & informally as pt w/ repetitions of questions (i.e: what time is my medicine due)  · Long Term Memory	intact  for biographical information  · Problem Solving	intact  100%  · Reasoning	intact  100%  · Organization	informally assessed to be mildly reduced  · Executive Functions	intact  · Executive Function Deficits Noted	mental flexibility    Motor Speech:   · Fluency	impaired  · Alternating Rapid Sounds	impaired  · Observations	slurred speech  · Articulation	imprecise  · Prosody	intact  · Rate	intact    Voice:   · Voice	WFL    VITALS  T(C): 36.8 (06-05-23 @ 04:39), Max: 36.8 (06-04-23 @ 08:38)  HR: 88 (06-05-23 @ 04:39) (83 - 96)  BP: 155/59 (06-05-23 @ 04:39) (124/74 - 173/71)  RR: 18 (06-05-23 @ 04:39) (18 - 18)  SpO2: 95% (06-05-23 @ 04:39) (95% - 98%)  Wt(kg): --    MEDICATIONS   albuterol    0.083% 2.5 milliGRAM(s) every 6 hours PRN  apixaban 2.5 milliGRAM(s) every 12 hours  aspirin  chewable 81 milliGRAM(s) daily  bacitracin   Ointment 1 Application(s) two times a day  budesonide 160 MICROgram(s)/formoterol 4.5 MICROgram(s) Inhaler 2 Puff(s) two times a day  chlorhexidine 2% Cloths 1 Application(s) daily  digoxin     Tablet 125 MICROGram(s) daily  famotidine    Tablet 20 milliGRAM(s) daily  ferrous    sulfate 325 milliGRAM(s) daily  levothyroxine 112 MICROGram(s) daily  metoprolol succinate ER 25 milliGRAM(s) daily  polyethylene glycol 3350 17 Gram(s) daily  senna 2 Tablet(s) at bedtime  simvastatin 10 milliGRAM(s) at bedtime      RECENT LABS/IMAGING  - Reviewed Today                      BLE V DOPPLERS 5/29 - No evidence of deep venous thrombosis in either lower extremity.    HEAD CT 5/30 - 1. Right anterior MCA acute infarction is suspected 2. Right PCA distribution remote infarction 3. Ischemic white matter disease and atrophy typical for age 4. En plaque meningioma right anterior cranial fossa floor 5. Intracranial atherosclerosis     HEAD CT 5/31 - Evolving acute infarction as described above. No interval hemorrhagic transformation. Chronic microvascular ischemic changes and chronic right occipital infarction.    MRI HEAD 6/2 - There are T2 prolongation signal abnormalities in the periventricular subcortical white matter likely related to severe chronic microvascular ischemic changes. Multiple acute/subacute infarcts noted in the right frontal, bilateral   parietal, right basal ganglia, right corona radiata, right frontal temporal region, left occipital and right cerebellum. Largest infarct measures proximally 3.5 x 1.1 cm in the right corona radiata. Chronic right occipital infarct noted There is no acute parenchymal hemorrhage, or midline shift. There is no extra-axial fluid collection.  There is no hydrocephalus. The visualized paranasal sinuses are well aerated IMPRESSION: Multiple bilateral acute/subacute infarcts as described above. No acute intracranial hemorrhage    -------------------------------------------------------------------------------------------------  PHYSICAL EXAM  Constitutional - NAD, Comfortable  Extremities - No edema  Neurologic Exam -                    Cognitive - AAOx4     Communication - Expressive deficits, Delayed processing, +Dysarthria     Cranial Nerves - Left lip droop      Motor -  Left hemiparesis, Impaired fine motor/coordination      Sensory - Intact to LT  Psychiatric - Anxious  ----------------------------------------------------------------------------------------  ASSESSMENT/PLAN  89yFemale with functional deficits after an acute CVA  Acute right M1 occlusion s/p TNK & thrombectomy - ASA, Zocor, Eliquis  AFIB/HTN - Toprol, Digoxin   Anemia - Iron, Venofer  Pulm - Albuterol, Symbicort  UTI - Rocephin   Pain - Tylenol  DVT PPX - SCDs, Lovenox  Rehab/Impaired mobility and function - Patient continues to require hospitalization for the above diagnoses and ongoing active management of comorbid complications (HTN optimization) that are substantially impairing functional ability and impairing quality of life necessitating acute rehab.    When medically optimized, based on the patient's diagnosis, functional status and potential for progress, continue to recommend ACUTE inpatient rehabilitation for the functional deficits consisting of 3 hours of therapy/day & 24 hour RN/daily PMR physician for comorbid medical management. Patient will be able to tolerate 3 hours a day.    Will continue to follow. Rehab recommendations are dependent on how functional progress changes as well as how patient continues to participate and tolerate therapeutic interventions, which may change. Recommend ongoing mobilization by staff to maintain cardiopulmonary function and prevention of secondary complications related to debility. Discussed the specific management and recommendations above with rehab clinical care team/rehab liaison.

## 2023-06-05 NOTE — PROGRESS NOTE ADULT - SUBJECTIVE AND OBJECTIVE BOX
Cerebral infarction    HPI:  89F with PMH afib recently taken off AC who presented with R gaze deviation and L sided weakness. Last known well 1900, was found by  at 2100 with symptoms. Patient taken to [facility] [place], code stroke initiated, NIH 26, found to have RM1 occlusion on CTA. Patient was given TNK at 23:33 and was transferred to [facility] for mechanical thrombectomy. On arrival, patient aphasic, unable to perform ROS.     NIH 26, mRS 0 on admission (29 May 2023 01:09)    Interval History:  Patient was seen and examined at bedside around 9:45 am.  Feels better.   Improving on daily basis.   Denies cough, chest pain, palpitations, shortness of breath, headache, dizziness, visual symptoms, nausea, vomiting or abdominal pain.    ROS:  As per interval history otherwise unremarkable.    PHYSICAL EXAM:  Vital Signs   T(C): 37 (05 Jun 2023 07:54), Max: 37 (05 Jun 2023 07:54)  T(F): 98.6 (05 Jun 2023 07:54), Max: 98.6 (05 Jun 2023 07:54)  HR: 72 (05 Jun 2023 10:50) (72 - 93)  BP: 142/68 (05 Jun 2023 10:50) (136/62 - 173/71)  BP(mean): 86 (04 Jun 2023 23:25) (86 - 105)  RR: 18 (05 Jun 2023 07:54) (18 - 18)  SpO2: 95% (05 Jun 2023 07:54) (95% - 97%)  Parameters below as of 05 Jun 2023 07:54  Patient On (Oxygen Delivery Method): room air  General: Elderly female sitting in chair comfortably. No acute distress  HEENT: EOMI. Clear conjunctivae. Moist mucus membrane  Neck: Supple.   Chest: CTA bilaterally. No wheezing, rales or rhonchi.   Heart: S1 & S2 with irregular rhythm.   Abdomen: Non distended. Soft. Non-tender. + BS  Ext: No pedal edema. No calf tenderness. Skin tears in right groin and left leg. Ecchymosis on both legs.    Neuro: Active and alert. Mild facial asymmetry. Motor 5/5 in RUE/RLE & 4-5/5 in LUE/LLE. Sensation intact. Reflexes 1+. Slurred speech.   Skin: Warm and Dry  Psychiatry: Normal mood and affect    I&O's Summary    04 Jun 2023 07:01  -  05 Jun 2023 07:00  --------------------------------------------------------  IN: 300 mL / OUT: 600 mL / NET: -300 mL    LABS:                      7.6    10.14 )-----------( 218      ( 03 Jun 2023 06:51 )             23.8     06-03    138  |  103  |  21.6<H>  ----------------------------<  95  4.3   |  25.0  |  0.56    Ca    8.6      03 Jun 2023 06:51  Mg     2.0     06-03    TPro  5.6<L>  /  Alb  3.2<L>  /  TBili  0.9  /  DBili  x   /  AST  34<H>  /  ALT  24  /  AlkPhos  61  06-03    RADIOLOGY & ADDITIONAL STUDIES:  Reviewed     MEDICATIONS  (STANDING):  apixaban 2.5 milliGRAM(s) Oral every 12 hours  ascorbic acid 500 milliGRAM(s) Oral daily  aspirin  chewable 81 milliGRAM(s) Oral daily  bacitracin   Ointment 1 Application(s) Topical two times a day  budesonide 160 MICROgram(s)/formoterol 4.5 MICROgram(s) Inhaler 2 Puff(s) Inhalation two times a day  chlorhexidine 2% Cloths 1 Application(s) Topical daily  digoxin     Tablet 125 MICROGram(s) Oral daily  famotidine    Tablet 20 milliGRAM(s) Oral daily  ferrous    sulfate 325 milliGRAM(s) Oral daily  levothyroxine 112 MICROGram(s) Oral daily  multivitamin 1 Tablet(s) Oral daily  polyethylene glycol 3350 17 Gram(s) Oral daily  senna 2 Tablet(s) Oral at bedtime  simvastatin 10 milliGRAM(s) Oral at bedtime    MEDICATIONS  (PRN):  albuterol    0.083% 2.5 milliGRAM(s) Nebulizer every 6 hours PRN Shortness of Breath and/or Wheezing

## 2023-06-05 NOTE — PROGRESS NOTE ADULT - ASSESSMENT
89 y/o F w/ PMH of AF (Eliquis stopped 5/23 by cardiologist due to freq falls), HTN, asthma/bronchiectasis, latent TB, hypothroidism, HLD, arthritis, chronic dizziness, macular degeneration. leadless pacemaker for SSS (placed 6/7/22), who presented on 5/28 to [facility] [place] with aphasia and LUE weakness and found to have a Rt M1 occlusion.  Pt received TNK and transferred to [facility] neuroICU as a code biplane.  She was emergently taken to angio for thrombectomy where TICI 3 revascularization of the Rt M1 was obtained.  Pts neurologic exam has continued to improved.  Pt was noted to have worsening mentation when SBP <110 so she was maintained on светлана x 24 hours.  Her SBP has since been liberalized and neurologic exam continues to improved.  Treated for UTI with Rocephin. Pt transferred to medicine on 5/30/23.  She had MRI which showed Multiple bilateral acute/subacute infarcts.    1) Acute CVA   - Likely embolic   - s/p TNK and Rt M1 thrombectomy 05/29  - MRI with multiple bilateral acute/subacute infarcts   - Clinically improving   - Neurochecks q6h  - Continue Aspirin 81mg daily and Eliquis  - Continue Zocor 10mg nightly   - s/p MBS Regular solids w/Mildly thick liquids   - Neurology recs appreciated    2) Chronic A. Fib   - Rate controlled   - PPM interrogated    - Digoxin 125mcg daily  - Continue Eliquis  - Cardiology recs appreciated    3) HTN  - Resume Amlodipine  - Ramipril and HCTZ on hold     4) Chronic Anemia   - Hemodynamically stable   - No active bleeding   - Monitor H/H and transfuse as needed  - Low iron stores, s/p Venofer. Continue Ferrous Sulfate.     5) Uncomplicated UTI  - Urine culture with E. Coli   - s/p Rocephin     6) Hypothyroidism  - Synthroid 112mcg daily    DVT Prophylaxis -- Patient is on Eliquis     Dispo: Acute Rehab pending auth.

## 2023-06-06 VITALS
RESPIRATION RATE: 18 BRPM | DIASTOLIC BLOOD PRESSURE: 69 MMHG | TEMPERATURE: 97 F | SYSTOLIC BLOOD PRESSURE: 139 MMHG | OXYGEN SATURATION: 94 % | HEART RATE: 92 BPM

## 2023-06-06 LAB
DIGOXIN SERPL-MCNC: 1 NG/ML — SIGNIFICANT CHANGE UP (ref 0.8–2)
HCT VFR BLD CALC: 28.3 % — LOW (ref 34.5–45)
HGB BLD-MCNC: 9 G/DL — LOW (ref 11.5–15.5)
MCHC RBC-ENTMCNC: 27 PG — SIGNIFICANT CHANGE UP (ref 27–34)
MCHC RBC-ENTMCNC: 31.8 GM/DL — LOW (ref 32–36)
MCV RBC AUTO: 85 FL — SIGNIFICANT CHANGE UP (ref 80–100)
PLATELET # BLD AUTO: 199 K/UL — SIGNIFICANT CHANGE UP (ref 150–400)
RBC # BLD: 3.33 M/UL — LOW (ref 3.8–5.2)
RBC # FLD: 18.4 % — HIGH (ref 10.3–14.5)
WBC # BLD: 9.26 K/UL — SIGNIFICANT CHANGE UP (ref 3.8–10.5)
WBC # FLD AUTO: 9.26 K/UL — SIGNIFICANT CHANGE UP (ref 3.8–10.5)

## 2023-06-06 PROCEDURE — 84100 ASSAY OF PHOSPHORUS: CPT

## 2023-06-06 PROCEDURE — 99239 HOSP IP/OBS DSCHRG MGMT >30: CPT

## 2023-06-06 PROCEDURE — 83550 IRON BINDING TEST: CPT

## 2023-06-06 PROCEDURE — 97530 THERAPEUTIC ACTIVITIES: CPT

## 2023-06-06 PROCEDURE — 80162 ASSAY OF DIGOXIN TOTAL: CPT

## 2023-06-06 PROCEDURE — 93970 EXTREMITY STUDY: CPT

## 2023-06-06 PROCEDURE — 87086 URINE CULTURE/COLONY COUNT: CPT

## 2023-06-06 PROCEDURE — 80048 BASIC METABOLIC PNL TOTAL CA: CPT

## 2023-06-06 PROCEDURE — 92610 EVALUATE SWALLOWING FUNCTION: CPT

## 2023-06-06 PROCEDURE — C1894: CPT

## 2023-06-06 PROCEDURE — 73560 X-RAY EXAM OF KNEE 1 OR 2: CPT

## 2023-06-06 PROCEDURE — 80307 DRUG TEST PRSMV CHEM ANLYZR: CPT

## 2023-06-06 PROCEDURE — C8929: CPT

## 2023-06-06 PROCEDURE — 94640 AIRWAY INHALATION TREATMENT: CPT

## 2023-06-06 PROCEDURE — 99232 SBSQ HOSP IP/OBS MODERATE 35: CPT

## 2023-06-06 PROCEDURE — 83540 ASSAY OF IRON: CPT

## 2023-06-06 PROCEDURE — 97535 SELF CARE MNGMENT TRAINING: CPT

## 2023-06-06 PROCEDURE — 82728 ASSAY OF FERRITIN: CPT

## 2023-06-06 PROCEDURE — 80061 LIPID PANEL: CPT

## 2023-06-06 PROCEDURE — 82550 ASSAY OF CK (CPK): CPT

## 2023-06-06 PROCEDURE — 80053 COMPREHEN METABOLIC PANEL: CPT

## 2023-06-06 PROCEDURE — 85610 PROTHROMBIN TIME: CPT

## 2023-06-06 PROCEDURE — C9399: CPT

## 2023-06-06 PROCEDURE — 86850 RBC ANTIBODY SCREEN: CPT

## 2023-06-06 PROCEDURE — 87186 SC STD MICRODIL/AGAR DIL: CPT

## 2023-06-06 PROCEDURE — 87640 STAPH A DNA AMP PROBE: CPT

## 2023-06-06 PROCEDURE — 85025 COMPLETE CBC W/AUTO DIFF WBC: CPT

## 2023-06-06 PROCEDURE — C1769: CPT

## 2023-06-06 PROCEDURE — 81001 URINALYSIS AUTO W/SCOPE: CPT

## 2023-06-06 PROCEDURE — 84484 ASSAY OF TROPONIN QUANT: CPT

## 2023-06-06 PROCEDURE — 99233 SBSQ HOSP IP/OBS HIGH 50: CPT

## 2023-06-06 PROCEDURE — 84443 ASSAY THYROID STIM HORMONE: CPT

## 2023-06-06 PROCEDURE — 84466 ASSAY OF TRANSFERRIN: CPT

## 2023-06-06 PROCEDURE — 74230 X-RAY XM SWLNG FUNCJ C+: CPT

## 2023-06-06 PROCEDURE — 70551 MRI BRAIN STEM W/O DYE: CPT

## 2023-06-06 PROCEDURE — C1887: CPT

## 2023-06-06 PROCEDURE — 93005 ELECTROCARDIOGRAM TRACING: CPT

## 2023-06-06 PROCEDURE — 85027 COMPLETE CBC AUTOMATED: CPT

## 2023-06-06 PROCEDURE — 82746 ASSAY OF FOLIC ACID SERUM: CPT

## 2023-06-06 PROCEDURE — 71045 X-RAY EXAM CHEST 1 VIEW: CPT

## 2023-06-06 PROCEDURE — 83735 ASSAY OF MAGNESIUM: CPT

## 2023-06-06 PROCEDURE — 70450 CT HEAD/BRAIN W/O DYE: CPT

## 2023-06-06 PROCEDURE — 86901 BLOOD TYPING SEROLOGIC RH(D): CPT

## 2023-06-06 PROCEDURE — 87635 SARS-COV-2 COVID-19 AMP PRB: CPT

## 2023-06-06 PROCEDURE — 82607 VITAMIN B-12: CPT

## 2023-06-06 PROCEDURE — 93308 TTE F-UP OR LMTD: CPT

## 2023-06-06 PROCEDURE — 86900 BLOOD TYPING SEROLOGIC ABO: CPT

## 2023-06-06 PROCEDURE — 85730 THROMBOPLASTIN TIME PARTIAL: CPT

## 2023-06-06 PROCEDURE — 61645 PERQ ART M-THROMBECT &/NFS: CPT

## 2023-06-06 PROCEDURE — 36415 COLL VENOUS BLD VENIPUNCTURE: CPT

## 2023-06-06 PROCEDURE — 87641 MR-STAPH DNA AMP PROBE: CPT

## 2023-06-06 PROCEDURE — 83036 HEMOGLOBIN GLYCOSYLATED A1C: CPT

## 2023-06-06 PROCEDURE — 76380 CAT SCAN FOLLOW-UP STUDY: CPT

## 2023-06-06 RX ORDER — ASPIRIN/CALCIUM CARB/MAGNESIUM 324 MG
1 TABLET ORAL
Qty: 0 | Refills: 0 | DISCHARGE
Start: 2023-06-06

## 2023-06-06 RX ORDER — FERROUS SULFATE 325(65) MG
1 TABLET ORAL
Qty: 0 | Refills: 0 | DISCHARGE
Start: 2023-06-06

## 2023-06-06 RX ORDER — ASCORBIC ACID 60 MG
1 TABLET,CHEWABLE ORAL
Qty: 0 | Refills: 0 | DISCHARGE
Start: 2023-06-06

## 2023-06-06 RX ORDER — POLYETHYLENE GLYCOL 3350 17 G/17G
17 POWDER, FOR SOLUTION ORAL
Qty: 0 | Refills: 0 | DISCHARGE
Start: 2023-06-06

## 2023-06-06 RX ADMIN — Medication 500 MILLIGRAM(S): at 14:03

## 2023-06-06 RX ADMIN — CHLORHEXIDINE GLUCONATE 1 APPLICATION(S): 213 SOLUTION TOPICAL at 14:02

## 2023-06-06 RX ADMIN — Medication 81 MILLIGRAM(S): at 14:03

## 2023-06-06 RX ADMIN — FAMOTIDINE 20 MILLIGRAM(S): 10 INJECTION INTRAVENOUS at 14:03

## 2023-06-06 RX ADMIN — Medication 125 MICROGRAM(S): at 05:28

## 2023-06-06 RX ADMIN — AMLODIPINE BESYLATE 5 MILLIGRAM(S): 2.5 TABLET ORAL at 05:28

## 2023-06-06 RX ADMIN — Medication 1 APPLICATION(S): at 14:02

## 2023-06-06 RX ADMIN — APIXABAN 2.5 MILLIGRAM(S): 2.5 TABLET, FILM COATED ORAL at 05:28

## 2023-06-06 RX ADMIN — Medication 325 MILLIGRAM(S): at 14:03

## 2023-06-06 RX ADMIN — Medication 112 MICROGRAM(S): at 05:28

## 2023-06-06 RX ADMIN — Medication 1 TABLET(S): at 14:03

## 2023-06-06 RX ADMIN — APIXABAN 2.5 MILLIGRAM(S): 2.5 TABLET, FILM COATED ORAL at 18:25

## 2023-06-06 RX ADMIN — POLYETHYLENE GLYCOL 3350 17 GRAM(S): 17 POWDER, FOR SOLUTION ORAL at 14:02

## 2023-06-06 NOTE — PROGRESS NOTE ADULT - SUBJECTIVE AND OBJECTIVE BOX
Patient continues to be quite anxious about the phone and using it.  Provided assist and perseverated on whether I had dialed 9 before the number.   Successfully connected call for patient.     FUNCTIONAL PROGRESS  6/5 PT  Bed Mobility  Bed Mobility Training Rehab Potential: good, to achieve stated therapy goals  Bed Mobility Training Sit-to-Supine: minimum assist (75% patient effort);  1 person assist  Bed Mobility Training Supine-to-Sit: minimum assist (75% patient effort);  1 person assist  Bed Mobility Training Limitations: decreased ability to use legs for bridging/pushing;  decreased strength;  impaired balance    Sit-Stand Transfer Training  Sit-to-Stand Transfer Training Rehab Potential: good, to achieve stated therapy goals  Transfer Training Sit-to-Stand Transfer: minimum assist (75% patient effort);  1 person assist;  weight-bearing as tolerated   rolling walker  Transfer Training Stand-to-Sit Transfer: minimum assist (75% patient effort);  1 person assist;  weight-bearing as tolerated   rolling walker  Sit-to-Stand Transfer Training Transfer Safety Analysis: decreased balance;  decreased step length;  decreased strength;  impaired balance;  impaired coordination;  rolling walker    Gait Training  Gait Training Rehab Potential: good, to achieve stated therapy goals  Gait Training: minimum assist (75% patient effort);  1 person assist;  weight-bearing as tolerated   rolling walker;  40 ft   Gait Analysis: 2-point gait   decreased liudmila;  decreased step length;  decreased stride length;  left lateral lean ;  decreased strength;  impaired balance    6/5 OT  Bed Mobility  Bed Mobility Training Sit-to-Supine: minimum assist (75% patient effort)  Bed Mobility Training Supine-to-Sit: minimum assist (75% patient effort)    Sit-Stand Transfer Training  Transfer Training Sit-to-Stand Transfer: minimum assist (75% patient effort)  Transfer Training Stand-to-Sit Transfer: minimum assist (75% patient effort)    Toilet Transfer Training  Transfer Training Toilet Transfer: minimum assist (75% patient effort)    Lower Body Dressing Training  Lower Body Dressing Training Assistance: minimum assist (75% patient effort)    Upper Body Dressing Training  Upper Body Dressing Training Assistance: minimum assist (75% patient effort)        VITALS  T(C): 36.6 (06-06-23 @ 07:30), Max: 36.7 (06-06-23 @ 00:24)  HR: 69 (06-06-23 @ 07:30) (68 - 78)  BP: 117/69 (06-06-23 @ 07:30) (117/69 - 149/70)  RR: 18 (06-06-23 @ 07:30) (18 - 18)  SpO2: 94% (06-06-23 @ 07:30) (94% - 97%)  Wt(kg): --    MEDICATIONS   albuterol    0.083% 2.5 milliGRAM(s) every 6 hours PRN  amLODIPine   Tablet 5 milliGRAM(s) daily  apixaban 2.5 milliGRAM(s) every 12 hours  ascorbic acid 500 milliGRAM(s) daily  aspirin  chewable 81 milliGRAM(s) daily  bacitracin   Ointment 1 Application(s) two times a day  budesonide 160 MICROgram(s)/formoterol 4.5 MICROgram(s) Inhaler 2 Puff(s) two times a day  chlorhexidine 2% Cloths 1 Application(s) daily  digoxin     Tablet 125 MICROGram(s) daily  famotidine    Tablet 20 milliGRAM(s) daily  ferrous    sulfate 325 milliGRAM(s) daily  hydrALAZINE Injectable 10 milliGRAM(s) every 6 hours PRN  levothyroxine 112 MICROGram(s) daily  multivitamin 1 Tablet(s) daily  polyethylene glycol 3350 17 Gram(s) daily  senna 2 Tablet(s) at bedtime  simvastatin 10 milliGRAM(s) at bedtime      RECENT LABS/IMAGING  - Reviewed Today                        9.0    9.26  )-----------( 199      ( 06 Jun 2023 06:33 )             28.3                                     BLE V DOPPLERS 5/29 - No evidence of deep venous thrombosis in either lower extremity.    HEAD CT 5/30 - 1. Right anterior MCA acute infarction is suspected 2. Right PCA distribution remote infarction 3. Ischemic white matter disease and atrophy typical for age 4. En plaque meningioma right anterior cranial fossa floor 5. Intracranial atherosclerosis     HEAD CT 5/31 - Evolving acute infarction as described above. No interval hemorrhagic transformation. Chronic microvascular ischemic changes and chronic right occipital infarction.    MRI HEAD 6/2 - There are T2 prolongation signal abnormalities in the periventricular subcortical white matter likely related to severe chronic microvascular ischemic changes. Multiple acute/subacute infarcts noted in the right frontal, bilateral   parietal, right basal ganglia, right corona radiata, right frontal temporal region, left occipital and right cerebellum. Largest infarct measures proximally 3.5 x 1.1 cm in the right corona radiata. Chronic right occipital infarct noted There is no acute parenchymal hemorrhage, or midline shift. There is no extra-axial fluid collection.  There is no hydrocephalus. The visualized paranasal sinuses are well aerated IMPRESSION: Multiple bilateral acute/subacute infarcts as described above. No acute intracranial hemorrhage    -------------------------------------------------------------------------------------------------  PHYSICAL EXAM  Constitutional - NAD, Comfortable  Extremities - No edema  Neurologic Exam -                    Cognitive - AAOx4     Communication - Expressive deficits, Delayed processing, +Dysarthria     Cranial Nerves - Left lip droop      Motor -  Left hemiparesis, Impaired fine motor/coordination      Sensory - Intact to LT  Psychiatric - Anxious  ----------------------------------------------------------------------------------------  ASSESSMENT/PLAN  89yFemale with functional deficits after an acute CVA  Acute right M1 occlusion s/p TNK & thrombectomy - ASA, Zocor, Eliquis  AFIB/HTN - Toprol, Digoxin   Anemia - Iron, Venofer  Pulm - Albuterol, Symbicort  UTI - Rocephin   Pain - Tylenol  DVT PPX - SCDs, Lovenox  Rehab/Impaired mobility and function - Patient continues to require hospitalization for the above diagnoses and ongoing active management of comorbid complications that have substantially impaired functional ability and impairing quality of life necessitating acute rehab.    When medically optimized, based on the patient's diagnosis, functional status and potential for progress, continue to recommend ACUTE inpatient rehabilitation for the functional deficits consisting of 3 hours of therapy/day & 24 hour RN/daily PMR physician for comorbid medical management. Patient will be able to tolerate 3 hours a day.    Will continue to follow. Rehab recommendations are dependent on how functional progress changes as well as how patient continues to participate and tolerate therapeutic interventions, which may change. Recommend ongoing mobilization by staff to maintain cardiopulmonary function and prevention of secondary complications related to debility. Discussed the specific management and recommendations above with rehab clinical care team/rehab liaison.

## 2023-06-06 NOTE — PROGRESS NOTE ADULT - SUBJECTIVE AND OBJECTIVE BOX
Preliminary note, offical recommendations pending attending review/signature   [facility] Stroke Team  Progress Note     HPI:  89F with PMH afib recently taken off AC who presented with Right  gaze deviation and Left sided weakness. Last known well 1900 5/28/23, was found by  at 2100 that day with symptoms. [place] to [facility] [place], code stroke initiated, NIH 26, found to have Right M1 occlusion on CTA, additionally noted basilar thrombus per Dr. Perez . Patient was given Tenecteplase at 23:33 5/28/23 and was transferred to [facility] for mechanical thrombectomy. On arrival, patient aphasic, unable to perform ROS. NIH 26, mRS 0 on admission. Pt underwent mechanical thrombectomy with TICI 3 recanalization of the right MCA.    SUBJECTIVE: Stated to be feeling better and motivated to go to rehab. No events overnight.  No new neurologic complaints.  ROS reported negative unless otherwise noted.    albuterol    0.083% 2.5 milliGRAM(s) Nebulizer every 6 hours PRN  amLODIPine   Tablet 5 milliGRAM(s) Oral daily  apixaban 2.5 milliGRAM(s) Oral every 12 hours  ascorbic acid 500 milliGRAM(s) Oral daily  aspirin  chewable 81 milliGRAM(s) Oral daily  bacitracin   Ointment 1 Application(s) Topical two times a day  budesonide 160 MICROgram(s)/formoterol 4.5 MICROgram(s) Inhaler 2 Puff(s) Inhalation two times a day  chlorhexidine 2% Cloths 1 Application(s) Topical daily  digoxin     Tablet 125 MICROGram(s) Oral daily  famotidine    Tablet 20 milliGRAM(s) Oral daily  ferrous    sulfate 325 milliGRAM(s) Oral daily  hydrALAZINE Injectable 10 milliGRAM(s) IV Push every 6 hours PRN  levothyroxine 112 MICROGram(s) Oral daily  multivitamin 1 Tablet(s) Oral daily  polyethylene glycol 3350 17 Gram(s) Oral daily  senna 2 Tablet(s) Oral at bedtime  simvastatin 10 milliGRAM(s) Oral at bedtime      PHYSICAL EXAM:   Vital Signs Last 24 Hrs  T(C): 36.1 (06 Jun 2023 12:21), Max: 36.7 (06 Jun 2023 00:24)  T(F): 97 (06 Jun 2023 12:21), Max: 98 (06 Jun 2023 00:24)  HR: 93 (06 Jun 2023 12:21) (68 - 93)  BP: 121/53 (06 Jun 2023 12:21) (117/69 - 149/70)  BP(mean): --  RR: 18 (06 Jun 2023 12:21) (18 - 18)  SpO2: 96% (06 Jun 2023 12:21) (94% - 97%)    Parameters below as of 06 Jun 2023 12:21  Patient On (Oxygen Delivery Method): room air    General: NAD, in chair reading the newspaper  Mental status: The patient is awake and alert, oriented to self, place, may,  year.  The patient is able to name objects  , follows simple commands, repeat , speech overall fluent. No neglect. No dysarthria    Cranial nerves: mild-moderate left facial palsy, Visual fields intact and able to state amount of fingers in peripheral fields, no  visual neglect. Extraocular motion is full with no nystagmus. There is no ptosis. Tongue midline.     Motor: There is normal bulk and tone.  There is no tremor.  Strength is 5/5 in the right arm and 5/5 in the right leg  Left arm is 4/5 when tested with right arm and 5/5 independently  Left leg is 4/5 when tested with right arm and 5/5 independently    Sensation: Intact to light touch and pin in 4 extremities with Slight decrease sensation in left lower extremity 98% compared to 100% in right lower extremity.  , left sensory extinction resolved.      Cerebellar: There is no dysmetria on finger to nose testing.    Gait : deferred    LABS:                        9.0    9.26  )-----------( 199      ( 06 Jun 2023 06:33 )             28.3     IMAGING: Reviewed by me.     MR Head No Cont (06.02.23 @ 10:20)   FINDINGS:  There is mild diffuse parenchymal volume loss.    There are T2 prolongation signal abnormalities in the periventricular   subcortical white matter likely related to severe chronic microvascular   ischemic changes.    Multiple acute/subacute infarcts noted in the right frontal, bilateral   parietal, right basal ganglia, right corona radiata, right frontal   temporal region, left occipital and right cerebellum. Largest infarct   measures proximally 3.5 x 1.1 cm in the right corona radiata.    Chronic right occipital infarct noted    There is no acute parenchymal hemorrhage, or midline shift. There is no   extra-axial fluid collection.  There is no hydrocephalus.  IMPRESSION:  Multiple bilateral acute/subacute infarcts as described above.  No acute intracranial hemorrhage    CT Head No Cont (05.31.23 @ 17:20)   IMPRESSION:  Evolving acute infarction as described above. Nointerval hemorrhagic   transformation. Chronic microvascular ischemic changes and chronic right   occipital infarction.    CT Head No Cont (05.30.23 @ 00:56)   IMPRESSION:  1. Right anterior MCA acute infarction is suspected  2. Right PCA distribution remote infarction  3. Ischemic white matter disease and atrophy typical for age  4. En plaque meningioma right anterior cranial fossa floor  5. Intracranial atherosclerosis       TTE Echo Complete w/ Contrast w/ Doppler (05.29.23 @ 12:49)   Summary:   1. Left ventricular ejection fraction, by visual estimation, is 60 to   65%.   2. Normal global left ventricular systolic function.   3. The mitral in-flow pattern reveals no discernable A-wave, therefore   no comment on diastolic function can be made.   4. There is mild concentric left ventricular hypertrophy.   5. Normal right ventricular sizeand function.   6. Moderate to severe left atrial enlargement.   7. Moderately enlarged right atrium.   8. Mild mitral valve regurgitation.   9. Sclerotic aortic valve with normal opening.  10. Mild aortic regurgitation.  11. Mild-moderate tricuspid regurgitation.  12. Estimated pulmonary artery systolic pressure is 40.2 mmHg assuming a   right atrial pressure of 10 mmHg, which is consistent with mild pulmonary   hypertension.  13. There is no evidence of pericardial effusion.     TTE Echo Limited or F/U (05.30.23 @ 15:15)   Summary:   1. Limited follow up agitated saline study.   2. Normal global left ventricular systolic function.   3. Left ventricular ejection fraction, by visual estimation, is 60 to   65%.   4. Color flow doppler and intravenous injection of agitated saline   demonstrates the presence of an intact intra atrial septum.    CT Head No Cont (05.31.23 @ 17:20)   The ventricular and sulcal size and configuration is age appropriate.      There are moderate patchy and confluent areas of hypodensity in the   periventricular and subcortical white matter which are likely related to   chronic microangiopathic changes. There is more conspicuous loss of   gray-white differentiation involving the right caudate, right basal   ganglia and anterior right insula consistent with evolving infarction. No   interval hemorrhagic transformation. There is redemonstrated right   occipital chronic infarction.    There is no evidence of mass effect, midline shift, acute intracranial   hemorrhage, or extra-axial collections.   The calvarium is intact. The paranasal sinuses are clear.The mastoid air   cells are predominantly clear. There has been prior bilateral cataract   surgery.      Evolving acute infarction as described above. Nointerval hemorrhagic   transformation. Chronic microvascular ischemic changes and chronic right   occipital infarction.                    [facility] Stroke Team  Progress Note     HPI:  89F with PMH afib recently taken off AC who presented with Right  gaze deviation and Left sided weakness. Last known well 1900 5/28/23, was found by  at 2100 that day with symptoms. [place] [facility] [place], code [place] initiated, NIH 26, found to have Right M1 occlusion on CTA, additionally noted basilar thrombus per Dr. Perez . Patient was given Tenecteplase at 23:33 5/28/23 and was transferred to [facility] for mechanical thrombectomy. On arrival, patient aphasic, unable to perform ROS. NIH 26, mRS 0 on admission. Pt underwent mechanical thrombectomy with TICI 3 recanalization of the right MCA.    SUBJECTIVE: Stated to be feeling better and motivated to go to rehab. No events overnight.  No new neurologic complaints.  ROS reported negative unless otherwise noted.    albuterol    0.083% 2.5 milliGRAM(s) Nebulizer every 6 hours PRN  amLODIPine   Tablet 5 milliGRAM(s) Oral daily  apixaban 2.5 milliGRAM(s) Oral every 12 hours  ascorbic acid 500 milliGRAM(s) Oral daily  aspirin  chewable 81 milliGRAM(s) Oral daily  bacitracin   Ointment 1 Application(s) Topical two times a day  budesonide 160 MICROgram(s)/formoterol 4.5 MICROgram(s) Inhaler 2 Puff(s) Inhalation two times a day  chlorhexidine 2% Cloths 1 Application(s) Topical daily  digoxin     Tablet 125 MICROGram(s) Oral daily  famotidine    Tablet 20 milliGRAM(s) Oral daily  ferrous    sulfate 325 milliGRAM(s) Oral daily  hydrALAZINE Injectable 10 milliGRAM(s) IV Push every 6 hours PRN  levothyroxine 112 MICROGram(s) Oral daily  multivitamin 1 Tablet(s) Oral daily  polyethylene glycol 3350 17 Gram(s) Oral daily  senna 2 Tablet(s) Oral at bedtime  simvastatin 10 milliGRAM(s) Oral at bedtime      PHYSICAL EXAM:   Vital Signs Last 24 Hrs  T(C): 36.1 (06 Jun 2023 12:21), Max: 36.7 (06 Jun 2023 00:24)  T(F): 97 (06 Jun 2023 12:21), Max: 98 (06 Jun 2023 00:24)  HR: 93 (06 Jun 2023 12:21) (68 - 93)  BP: 121/53 (06 Jun 2023 12:21) (117/69 - 149/70)  BP(mean): --  RR: 18 (06 Jun 2023 12:21) (18 - 18)  SpO2: 96% (06 Jun 2023 12:21) (94% - 97%)    Parameters below as of 06 Jun 2023 12:21  Patient On (Oxygen Delivery Method): room air    General: NAD, in chair reading the newspaper  Mental status: The patient is awake and alert, oriented to self, place, may,  year.  The patient is able to name objects  , follows simple commands, repeat , speech overall fluent. No neglect. No dysarthria    Cranial nerves: mild-moderate left facial palsy, Visual fields intact and able to state amount of fingers in peripheral fields, no  visual neglect. Extraocular motion is full with no nystagmus. There is no ptosis. Tongue midline.     Motor: There is normal bulk and tone.  There is no tremor.  Strength is 5/5 in the right arm and 5/5 in the right leg  Left arm is 4/5 when tested with right arm and 5/5 independently  Left leg is 4/5 when tested with right arm and 5/5 independently    Sensation: Intact to light touch and pin in 4 extremities with Slight decrease sensation in left lower extremity 98% compared to 100% in right lower extremity.  , left sensory extinction resolved.      Cerebellar: There is no dysmetria on finger to nose testing.    Gait : deferred    LABS:                        9.0    9.26  )-----------( 199      ( 06 Jun 2023 06:33 )             28.3     IMAGING: Reviewed by me.     MR Head No Cont (06.02.23 @ 10:20)   FINDINGS:  There is mild diffuse parenchymal volume loss.    There are T2 prolongation signal abnormalities in the periventricular   subcortical white matter likely related to severe chronic microvascular   ischemic changes.    Multiple acute/subacute infarcts noted in the right frontal, bilateral   parietal, right basal ganglia, right corona radiata, right frontal   temporal region, left occipital and right cerebellum. Largest infarct   measures proximally 3.5 x 1.1 cm in the right corona radiata.    Chronic right occipital infarct noted    There is no acute parenchymal hemorrhage, or midline shift. There is no   extra-axial fluid collection.  There is no hydrocephalus.  IMPRESSION:  Multiple bilateral acute/subacute infarcts as described above.  No acute intracranial hemorrhage    CT Head No Cont (05.31.23 @ 17:20)   IMPRESSION:  Evolving acute infarction as described above. Nointerval hemorrhagic   transformation. Chronic microvascular ischemic changes and chronic right   occipital infarction.    CT Head No Cont (05.30.23 @ 00:56)   IMPRESSION:  1. Right anterior MCA acute infarction is suspected  2. Right PCA distribution remote infarction  3. Ischemic white matter disease and atrophy typical for age  4. En plaque meningioma right anterior cranial fossa floor  5. Intracranial atherosclerosis       TTE Echo Complete w/ Contrast w/ Doppler (05.29.23 @ 12:49)   Summary:   1. Left ventricular ejection fraction, by visual estimation, is 60 to   65%.   2. Normal global left ventricular systolic function.   3. The mitral in-flow pattern reveals no discernable A-wave, therefore   no comment on diastolic function can be made.   4. There is mild concentric left ventricular hypertrophy.   5. Normal right ventricular sizeand function.   6. Moderate to severe left atrial enlargement.   7. Moderately enlarged right atrium.   8. Mild mitral valve regurgitation.   9. Sclerotic aortic valve with normal opening.  10. Mild aortic regurgitation.  11. Mild-moderate tricuspid regurgitation.  12. Estimated pulmonary artery systolic pressure is 40.2 mmHg assuming a   right atrial pressure of 10 mmHg, which is consistent with mild pulmonary   hypertension.  13. There is no evidence of pericardial effusion.     TTE Echo Limited or F/U (05.30.23 @ 15:15)   Summary:   1. Limited follow up agitated saline study.   2. Normal global left ventricular systolic function.   3. Left ventricular ejection fraction, by visual estimation, is 60 to   65%.   4. Color flow doppler and intravenous injection of agitated saline   demonstrates the presence of an intact intra atrial septum.    CT Head No Cont (05.31.23 @ 17:20)   The ventricular and sulcal size and configuration is age appropriate.      There are moderate patchy and confluent areas of hypodensity in the   periventricular and subcortical white matter which are likely related to   chronic microangiopathic changes. There is more conspicuous loss of   gray-white differentiation involving the right caudate, right basal   ganglia and anterior right insula consistent with evolving infarction. No   interval hemorrhagic transformation. There is redemonstrated right   occipital chronic infarction.    There is no evidence of mass effect, midline shift, acute intracranial   hemorrhage, or extra-axial collections.   The calvarium is intact. The paranasal sinuses are clear.The mastoid air   cells are predominantly clear. There has been prior bilateral cataract   surgery.      Evolving acute infarction as described above. Nointerval hemorrhagic   transformation. Chronic microvascular ischemic changes and chronic right   occipital infarction.

## 2023-06-06 NOTE — PROGRESS NOTE ADULT - PROVIDER SPECIALTY LIST ADULT
Hospitalist
Neurology
Physiatry
Rehab Medicine
Intervent Radiology
Intervent Radiology
Neurology
Rehab Medicine
Hospitalist
NSICU
Neurology
Rehab Medicine
Rehab Medicine
Hospitalist
Neurology

## 2023-06-06 NOTE — PROGRESS NOTE ADULT - REASON FOR ADMISSION
stroke

## 2023-06-06 NOTE — PROGRESS NOTE ADULT - NS ATTEND AMEND GEN_ALL_CORE FT
Patient was seen and examined by me. History and exam as documented above by PA/NP was confirmed by me.  Agree with plan as outlined above.
Patient was seen and examined by me. History and exam as documented above by PA/NP was confirmed by me.  Agree with plan as outlined above.
I have seen and examined the patient and have documented the pertinent findings and history and assessment. I agree with the documentation by advanced practice provider.
Agree with PA's assessment and plan.   Neurologically stable. MRI brain performed and reviewed. Right frontal, right BG, right cerebellar, and left occipital strokes - embolic  - recommend continuing anticoagulation - do not stop in the future  - PT/OT  - rehab - Pt's will be going to [facility] today/tomorrow.  discussed above with pt and her primary hospitalist. please call with questions
I saw and evaluated the patient. I agree with the findings and plan of care as documented in the advanced practice provider’s note.

## 2023-06-06 NOTE — CHART NOTE - NSCHARTNOTESELECT_GEN_ALL_CORE
Event Note
Event Note
Neurointerventional Surgery Post Procedure Note/Event Note
Neurointerventional Surgery Pre Procedure Note/Event Note
Nutrition Services
Transfer Note

## 2023-06-06 NOTE — CHART NOTE - NSCHARTNOTEFT_GEN_A_CORE
Patient has Chronic Anemia. Found to have low iron stores so she was given Venofer and currently she is on Ferrous Sulfate.   During hospitalization, she has been monitored closely. No signs of active bleeding.  Today her H&H is 9.0/28.3.

## 2023-06-06 NOTE — PROGRESS NOTE ADULT - ASSESSMENT
ASSESSMENT: 89F with PMH afib recently taken off AC about 1 week post fall noted s/p fall, PPM, who presented with Right  gaze deviation and Left sided weakness. Last known well 1900 5/28/23, was found by  at 2100 that day with symptoms. Patient taken to [facility] [place], code stroke initiated, NIH 26, found to have Right M1 and basilar apex occlusion on CT Angiogram, post tenecteplase. Subsequently underwent mechanical thrombectomy with TICI 3 recanalization of RMCA.   Basilar thrombus migrated post tenecteplase to left P1 origin, left PCA filling via left PComm. MRI demonstrates again multiple acute and subacute infarcts bilaterally in multiple vascular territories consistent with embolic event. Etiology likely cardioembolic in setting of atrial fibrillation.    NEURO: neurologically overall improving post thrombectomy, Significant improvement in cognition and left upper and lower extremity strength, Continue close monitoring for neurologic deterioration , stroke neuro checks  q 4 ,  SBP goal post thrombectomy 110-140mmHg , titrate statin to LDL goal less than 70, MRI cleared from PPM standpoint and noted above, Physical therapy/OT/Speech eval/treatment.     ANTITHROMBOTIC THERAPY: ASA as per cardiology/medical indication, would avoid if possible given significant anemia and need for anticoagulation.  Patient has a history of atrial fibrillation now with cardioembolic event. If no absolute contraindication or current significant risk of hemorrhage as overall benefit> risks per d/w Son  then resuming her home dose of Apixaban 2.5mg BID is reasonable.  Patient needs monitoring and support for all ADL to avoid and minimize fall risk.  At the request of the patient, assessment and plan were d/w son who is a physician. Son is very concerned with recurrent thromboembolic risk for stroke as opposed to bleeding risk as notes has had recent GI workup and would rather have her on Eliquis now with the knowledge of her slowly decreasing hemoglobin. He understands the associated risk of hemorrhage but at this time from neurological standpoint overall benefit may outweigh risk of further anemia with anticoagulation. FALL PRECAUTIONS.     PULMONARY: protecting airway, saturating well     CARDIOVASCULAR:  TTE as noted, EF 60-65%, mild concentric LVH, moderate to severe LAE, moderately enlarged RA, mild MVR, mild AR, mild-moderate TR, mild pulmonary htn, cardiac monitoring to ensure rate control.  If not candidate for AC suggest to consider watchman. May discuss DARIUS closure with outpatient cardiologist.     GASTROINTESTINAL:  dysphagia screen deferred to SLP- who suggested on repeat evaluation regular/thin.     RENAL: BUN/Cr 21.6/0.56 monitor urine output, maintain adequate hydration       Na Goal:  135-145    HEMATOLOGY: H/H with anemia 9.0/28.3 with improvement, Platelets 199, patient should have all age and risk appropriate malignancy screenings with PCP or sooner if clinically suspected, close monitoring, transfusion as needed consider < 8 in setting of ischemic event recently,  guaiac suggested , s/p venofer infusion for anemia, screen for source of bleeding, GI and heme follow up as clinically indicated. Son reported recent GI follow up as an outpatient post colonoscopy.     DVT ppx: Apixaban     ID: afebrile,  leukocytosis resolved , + UTI, cx and abx per primary team- adjustment as needed pending sensitivity .     OTHER: Left knee joint effusion under the care per primary team. Ortho and rheum follow up as needed. condition and plan of care d/w patient, son previously, and primary attending . questions and concerns addressed.      DISPOSITION: Rehab or home depending on PT eval once stable and workup is complete      CORE MEASURES:        Admission NIHSS:  26     Tenecteplase : [x] YES [] NO      LDL/HDL/A1C: 26/27/5.7     Depression Screen- if depression hx and/or present -p     Statin Therapy: as noted      Dysphagia Screen: [x] PASS [] FAIL  SLP      Smoking [] YES [x] NO      Afib [x] YES [] NO     Stroke Education [x] YES [] NO    Obtain screening lower extremity venous ultrasound in patients who meet 1 or more of the following criteria as patient is high risk for DVT/PE on admission:   [] History of DVT/PE  []Hypercoagulable states (Factor V Leiden, Cancer, OCP, etc. )  []Prolonged immobility (hemiplegia/hemiparesis/post operative or any other extended immobilization)  [] Transferred from outside facility (Rehab or Long term care)  [] Age </= to 50

## 2023-06-19 PROBLEM — Z00.00 ENCOUNTER FOR PREVENTIVE HEALTH EXAMINATION: Status: ACTIVE | Noted: 2023-06-19

## 2023-06-19 RX ORDER — FAMOTIDINE 10 MG/ML
1 INJECTION INTRAVENOUS
Refills: 0 | DISCHARGE

## 2023-06-19 RX ORDER — LEVOTHYROXINE SODIUM 125 MCG
1 TABLET ORAL
Refills: 0 | DISCHARGE

## 2023-06-19 RX ORDER — LEVALBUTEROL 1.25 MG/.5ML
3 SOLUTION, CONCENTRATE RESPIRATORY (INHALATION)
Refills: 0 | DISCHARGE

## 2023-06-19 RX ORDER — APIXABAN 2.5 MG/1
1 TABLET, FILM COATED ORAL
Refills: 0 | DISCHARGE
End: 2023-05-23

## 2023-06-19 RX ORDER — RAMIPRIL 5 MG
1 CAPSULE ORAL
Refills: 0 | DISCHARGE

## 2023-06-19 RX ORDER — AMLODIPINE BESYLATE 2.5 MG/1
1 TABLET ORAL
Refills: 0 | DISCHARGE

## 2023-06-19 RX ORDER — FLUTICASONE PROPIONATE AND SALMETEROL 50; 250 UG/1; UG/1
1 POWDER ORAL; RESPIRATORY (INHALATION)
Refills: 0 | DISCHARGE

## 2023-06-19 RX ORDER — SIMVASTATIN 20 MG/1
1 TABLET, FILM COATED ORAL
Refills: 0 | DISCHARGE

## 2023-06-19 RX ORDER — DIGOXIN 250 MCG
1 TABLET ORAL
Refills: 0 | DISCHARGE

## 2023-06-19 RX ORDER — HYDROCHLOROTHIAZIDE 25 MG
1 TABLET ORAL
Refills: 0 | DISCHARGE

## 2024-02-15 NOTE — SWALLOW BEDSIDE ASSESSMENT ADULT - SWALLOW EVAL: BUCCAL STRENGTH AND MOBILITY
"Inpatient Diabetes Education Consult follow up:    BS today 2/14/2024 at 11:39 was 236 mg/dl .No change in diabetes medications.     Current Outpatient Medications   Medication Instructions    acetaminophen (TYLENOL) 325 mg, oral, Every 6 hours PRN    atorvastatin (LIPITOR) 80 mg, oral, Daily    docusate sodium (COLACE) 100 mg, oral, 2 times daily    fenofibrate (TRICOR) 145 mg, oral, Daily    furosemide (LASIX) 40 mg, oral, Daily    insulin lispro (HumaLOG) 100 unit/mL injection subcutaneous, 3 times daily with meals, Take as directed per insulin instructions.    Lantus U-100 Insulin 20 Units, subcutaneous, Every morning, Take as directed per insulin instructions.    pantoprazole (PROTONIX) 20 mg, oral, 2 times daily before meals, Do not crush, chew, or split.    spironolactone (ALDACTONE) 50 mg, oral, 2 times daily    traZODone (DESYREL) 25 mg, oral, Nightly    ursodiol (ACTIGALL) 300 mg, oral, 3 times daily       Glucose   Date/Time Value Ref Range Status   02/14/2024 06:36  (H) 65 - 99 mg/dL Final   02/13/2024 05:53  (H) 65 - 99 mg/dL Final   02/12/2024 04:55  (H) 65 - 99 mg/dL Final   02/10/2024 04:40  (H) 65 - 99 mg/dL Final   02/09/2024 05:20  (H) 65 - 99 mg/dL Final   02/08/2024 06:33  (H) 65 - 99 mg/dL Final   02/07/2024 02:13  (HH) 65 - 99 mg/dL Final     Comment:     Result rechecked   01/29/2024 05:02  (H) 65 - 99 mg/dL Final   01/28/2024 02:05  (H) 65 - 99 mg/dL Final   01/27/2024 05:18  (H) 65 - 99 mg/dL Final     No results found for: \"CPEPTIDE\"  Hemoglobin A1C   Date Value Ref Range Status   02/09/2024 11.3 (H) See below % Final   10/21/2023 11.7 (H) 4.3 - 5.6 % Final     Comment:     American Diabetes Association guidelines indicate that patients with HgbA1c in the range 5.7-6.4% are at increased risk for development of diabetes, and intervention by lifestyle modification may be beneficial. HgbA1c greater or equal to 6.5% is considered " diagnostic of diabetes.   09/07/2023 11.5 (H) 4.3 - 5.6 % Final     Comment:     American Diabetes Association guidelines indicate that patients with HgbA1c in the range 5.7-6.4% are at increased risk for development of diabetes, and intervention by lifestyle modification may be beneficial. HgbA1c greater or equal to 6.5% is considered diagnostic of diabetes.   08/08/2023 10.7 (H) 4.3 - 5.6 % Final     Comment:     American Diabetes Association guidelines indicate that patients with HgbA1c in the range 5.7-6.4% are at increased risk for development of diabetes, and intervention by lifestyle modification may be beneficial. HgbA1c greater or equal to 6.5% is considered diagnostic of diabetes.   05/27/2023 10.0 (H) 4.3 - 5.6 % Final     Comment:     American Diabetes Association guidelines indicate that patients with HgbA1c in the range 5.7-6.4% are at increased risk for development of diabetes, and intervention by lifestyle modification may be beneficial. HgbA1c greater or equal to 6.5% is considered diagnostic of diabetes.               reduced on left
